# Patient Record
Sex: FEMALE | Race: WHITE | Employment: OTHER | URBAN - METROPOLITAN AREA
[De-identification: names, ages, dates, MRNs, and addresses within clinical notes are randomized per-mention and may not be internally consistent; named-entity substitution may affect disease eponyms.]

---

## 2017-10-01 ENCOUNTER — HOSPITAL ENCOUNTER (EMERGENCY)
Facility: HOSPITAL | Age: 67
Discharge: HOME | End: 2017-10-01
Attending: EMERGENCY MEDICINE
Payer: MEDICARE

## 2017-10-01 ENCOUNTER — APPOINTMENT (OUTPATIENT)
Dept: CT IMAGING | Facility: HOSPITAL | Age: 67
End: 2017-10-01
Attending: EMERGENCY MEDICINE
Payer: MEDICARE

## 2017-10-01 ENCOUNTER — APPOINTMENT (OUTPATIENT)
Dept: GENERAL RADIOLOGY | Facility: HOSPITAL | Age: 67
End: 2017-10-01
Attending: EMERGENCY MEDICINE
Payer: MEDICARE

## 2017-10-01 VITALS
RESPIRATION RATE: 17 BRPM | BODY MASS INDEX: 29.88 KG/M2 | TEMPERATURE: 97 F | WEIGHT: 175 LBS | OXYGEN SATURATION: 96 % | HEIGHT: 64 IN | DIASTOLIC BLOOD PRESSURE: 70 MMHG | HEART RATE: 69 BPM | SYSTOLIC BLOOD PRESSURE: 137 MMHG

## 2017-10-01 DIAGNOSIS — S42.225A CLOSED 2-PART NONDISPLACED FRACTURE OF SURGICAL NECK OF LEFT HUMERUS, INITIAL ENCOUNTER: ICD-10-CM

## 2017-10-01 DIAGNOSIS — S81.811A LEG LACERATION, RIGHT, INITIAL ENCOUNTER: ICD-10-CM

## 2017-10-01 DIAGNOSIS — S09.90XA CLOSED HEAD INJURY, INITIAL ENCOUNTER: Primary | ICD-10-CM

## 2017-10-01 PROCEDURE — 96361 HYDRATE IV INFUSION ADD-ON: CPT

## 2017-10-01 PROCEDURE — 12004 RPR S/N/AX/GEN/TRK7.6-12.5CM: CPT

## 2017-10-01 PROCEDURE — 99284 EMERGENCY DEPT VISIT MOD MDM: CPT

## 2017-10-01 PROCEDURE — 96375 TX/PRO/DX INJ NEW DRUG ADDON: CPT

## 2017-10-01 PROCEDURE — 70450 CT HEAD/BRAIN W/O DYE: CPT | Performed by: EMERGENCY MEDICINE

## 2017-10-01 PROCEDURE — 90471 IMMUNIZATION ADMIN: CPT

## 2017-10-01 PROCEDURE — 73030 X-RAY EXAM OF SHOULDER: CPT | Performed by: EMERGENCY MEDICINE

## 2017-10-01 PROCEDURE — 96374 THER/PROPH/DIAG INJ IV PUSH: CPT

## 2017-10-01 RX ORDER — HYDROMORPHONE HYDROCHLORIDE 1 MG/ML
0.5 INJECTION, SOLUTION INTRAMUSCULAR; INTRAVENOUS; SUBCUTANEOUS ONCE
Status: COMPLETED | OUTPATIENT
Start: 2017-10-01 | End: 2017-10-01

## 2017-10-01 RX ORDER — ONDANSETRON 4 MG/1
4 TABLET, ORALLY DISINTEGRATING ORAL EVERY 4 HOURS PRN
Qty: 10 TABLET | Refills: 0 | Status: SHIPPED | OUTPATIENT
Start: 2017-10-01 | End: 2017-10-08

## 2017-10-01 RX ORDER — HYDROCODONE BITARTRATE AND ACETAMINOPHEN 5; 325 MG/1; MG/1
1-2 TABLET ORAL EVERY 4 HOURS PRN
Qty: 20 TABLET | Refills: 0 | Status: SHIPPED | OUTPATIENT
Start: 2017-10-01 | End: 2017-10-03

## 2017-10-01 RX ORDER — ONDANSETRON 2 MG/ML
4 INJECTION INTRAMUSCULAR; INTRAVENOUS ONCE
Status: COMPLETED | OUTPATIENT
Start: 2017-10-01 | End: 2017-10-01
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
## 2017-10-04 PROBLEM — S42.202A CLOSED FRACTURE OF PROXIMAL END OF LEFT HUMERUS: Status: ACTIVE | Noted: 2017-10-04

## 2017-10-04 PROBLEM — Z96.653 HISTORY OF TOTAL BILATERAL KNEE REPLACEMENT: Status: ACTIVE | Noted: 2017-10-04

## 2017-11-21 PROBLEM — M25.512 ACUTE PAIN OF LEFT SHOULDER: Status: ACTIVE | Noted: 2017-11-21

## 2019-05-05 ENCOUNTER — WALK IN (OUTPATIENT)
Dept: URGENT CARE | Age: 69
End: 2019-05-05

## 2019-05-05 DIAGNOSIS — J06.9 UPPER RESPIRATORY TRACT INFECTION, UNSPECIFIED TYPE: Primary | ICD-10-CM

## 2019-05-05 PROCEDURE — 99203 OFFICE O/P NEW LOW 30 MIN: CPT | Performed by: NURSE PRACTITIONER

## 2019-05-05 RX ORDER — BENZONATATE 100 MG/1
100 CAPSULE ORAL 3 TIMES DAILY PRN
Qty: 15 CAPSULE | Refills: 0 | Status: SHIPPED | OUTPATIENT
Start: 2019-05-05 | End: 2019-05-10

## 2019-05-05 RX ORDER — LEVOTHYROXINE SODIUM 0.05 MG/1
50 TABLET ORAL DAILY
COMMUNITY

## 2019-05-05 RX ORDER — ALBUTEROL SULFATE 90 UG/1
2 AEROSOL, METERED RESPIRATORY (INHALATION) EVERY 4 HOURS PRN
Qty: 1 INHALER | Refills: 0 | Status: SHIPPED | OUTPATIENT
Start: 2019-05-05

## 2019-05-05 ASSESSMENT — PAIN SCALES - GENERAL: PAINLEVEL: 0

## 2019-05-05 ASSESSMENT — ENCOUNTER SYMPTOMS
SINUS PRESSURE: 0
RHINORRHEA: 0
COUGH: 1
WHEEZING: 1
SORE THROAT: 1
SINUS PAIN: 0

## 2019-05-20 PROBLEM — M25.512 ACUTE PAIN OF LEFT SHOULDER: Status: RESOLVED | Noted: 2017-11-21 | Resolved: 2019-05-20

## 2019-05-20 PROBLEM — Z96.653 HISTORY OF TOTAL BILATERAL KNEE REPLACEMENT: Status: RESOLVED | Noted: 2017-10-04 | Resolved: 2019-05-20

## 2019-05-20 PROBLEM — S42.202A CLOSED FRACTURE OF PROXIMAL END OF LEFT HUMERUS: Status: RESOLVED | Noted: 2017-10-04 | Resolved: 2019-05-20

## 2019-11-29 ENCOUNTER — WALK IN (OUTPATIENT)
Dept: URGENT CARE | Age: 69
End: 2019-11-29

## 2019-11-29 DIAGNOSIS — Z23 NEED FOR VACCINATION: Primary | ICD-10-CM

## 2019-11-29 PROCEDURE — G0008 ADMIN INFLUENZA VIRUS VAC: HCPCS | Performed by: NURSE PRACTITIONER

## 2019-11-29 PROCEDURE — 90662 IIV NO PRSV INCREASED AG IM: CPT | Performed by: NURSE PRACTITIONER

## 2020-12-16 PROBLEM — Z96.653 PRESENCE OF BOTH ARTIFICIAL KNEE JOINTS: Status: ACTIVE | Noted: 2019-04-04

## 2020-12-16 PROBLEM — E06.3 HASHIMOTO'S THYROIDITIS: Status: ACTIVE | Noted: 2020-12-16

## 2021-05-26 VITALS
RESPIRATION RATE: 16 BRPM | TEMPERATURE: 98 F | WEIGHT: 174 LBS | DIASTOLIC BLOOD PRESSURE: 76 MMHG | SYSTOLIC BLOOD PRESSURE: 120 MMHG | OXYGEN SATURATION: 97 % | HEART RATE: 110 BPM | HEIGHT: 63 IN | BODY MASS INDEX: 30.83 KG/M2

## 2021-09-22 PROBLEM — Z96.653 PRESENCE OF BOTH ARTIFICIAL KNEE JOINTS: Status: RESOLVED | Noted: 2019-04-04 | Resolved: 2021-09-22

## 2024-08-29 ENCOUNTER — V-VISIT (OUTPATIENT)
Dept: URGENT CARE | Age: 74
End: 2024-08-29

## 2024-08-29 VITALS
SYSTOLIC BLOOD PRESSURE: 144 MMHG | TEMPERATURE: 97.6 F | OXYGEN SATURATION: 96 % | HEART RATE: 89 BPM | BODY MASS INDEX: 26.12 KG/M2 | HEIGHT: 64 IN | RESPIRATION RATE: 16 BRPM | WEIGHT: 153 LBS | DIASTOLIC BLOOD PRESSURE: 86 MMHG

## 2024-08-29 DIAGNOSIS — J01.90 ACUTE NON-RECURRENT SINUSITIS, UNSPECIFIED LOCATION: Primary | ICD-10-CM

## 2024-08-29 ASSESSMENT — ENCOUNTER SYMPTOMS
SORE THROAT: 1
VOMITING: 0
ABDOMINAL PAIN: 0
DIARRHEA: 0
SHORTNESS OF BREATH: 0
SINUS PRESSURE: 1
FEVER: 0
NAUSEA: 0
CHILLS: 1
COUGH: 0
WHEEZING: 0
RHINORRHEA: 1

## 2024-08-29 ASSESSMENT — PAIN SCALES - GENERAL: PAINLEVEL: 0

## 2025-02-20 ENCOUNTER — APPOINTMENT (OUTPATIENT)
Dept: MRI IMAGING | Facility: HOSPITAL | Age: 75
End: 2025-02-20
Attending: EMERGENCY MEDICINE
Payer: MEDICARE

## 2025-02-20 ENCOUNTER — APPOINTMENT (OUTPATIENT)
Dept: CT IMAGING | Facility: HOSPITAL | Age: 75
End: 2025-02-20
Attending: EMERGENCY MEDICINE
Payer: MEDICARE

## 2025-02-20 ENCOUNTER — HOSPITAL ENCOUNTER (INPATIENT)
Facility: HOSPITAL | Age: 75
LOS: 17 days | Discharge: NURSING, REHAB OR LONG-TERM CARE | End: 2025-03-09
Attending: EMERGENCY MEDICINE | Admitting: HOSPITALIST
Payer: MEDICARE

## 2025-02-20 DIAGNOSIS — C34.90 PRIMARY MALIGNANT NEOPLASM OF LUNG METASTATIC TO OTHER SITE, UNSPECIFIED LATERALITY (HCC): ICD-10-CM

## 2025-02-20 DIAGNOSIS — I62.9 INTRACRANIAL HEMORRHAGE (HCC): ICD-10-CM

## 2025-02-20 DIAGNOSIS — R41.0 DELIRIUM, ACUTE: Primary | ICD-10-CM

## 2025-02-20 DIAGNOSIS — E86.0 DEHYDRATION: ICD-10-CM

## 2025-02-20 PROBLEM — I63.89 ACUTE HEMORRHAGIC INFARCTION OF BRAIN (HCC): Status: ACTIVE | Noted: 2025-02-20

## 2025-02-20 LAB
ANION GAP SERPL CALC-SCNC: 15 MMOL/L (ref 0–18)
ANTIBODY SCREEN: NEGATIVE
ATRIAL RATE: 126 BPM
BASOPHILS # BLD AUTO: 0.03 X10(3) UL (ref 0–0.2)
BASOPHILS NFR BLD AUTO: 0.4 %
BILIRUB UR QL: NEGATIVE
BUN BLD-MCNC: 22 MG/DL (ref 9–23)
BUN/CREAT SERPL: 31 (ref 10–20)
CALCIUM BLD-MCNC: 8.3 MG/DL (ref 8.7–10.4)
CHLORIDE SERPL-SCNC: 109 MMOL/L (ref 98–112)
CLARITY UR: CLEAR
CO2 SERPL-SCNC: 19 MMOL/L (ref 21–32)
COLOR UR: YELLOW
CREAT BLD-MCNC: 0.71 MG/DL
DEPRECATED RDW RBC AUTO: 48.2 FL (ref 35.1–46.3)
EGFRCR SERPLBLD CKD-EPI 2021: 89 ML/MIN/1.73M2 (ref 60–?)
EOSINOPHIL # BLD AUTO: 0.01 X10(3) UL (ref 0–0.7)
EOSINOPHIL NFR BLD AUTO: 0.1 %
ERYTHROCYTE [DISTWIDTH] IN BLOOD BY AUTOMATED COUNT: 13 % (ref 11–15)
EST. AVERAGE GLUCOSE BLD GHB EST-MCNC: 128 MG/DL (ref 68–126)
FLUAV + FLUBV RNA SPEC NAA+PROBE: NEGATIVE
FLUAV + FLUBV RNA SPEC NAA+PROBE: NEGATIVE
GLUCOSE BLD-MCNC: 176 MG/DL (ref 70–99)
GLUCOSE BLDC GLUCOMTR-MCNC: 180 MG/DL (ref 70–99)
GLUCOSE UR-MCNC: NORMAL MG/DL
HBA1C MFR BLD: 6.1 % (ref ?–5.7)
HCT VFR BLD AUTO: 35.7 %
HGB BLD-MCNC: 12.2 G/DL
IMM GRANULOCYTES # BLD AUTO: 0.11 X10(3) UL (ref 0–1)
IMM GRANULOCYTES NFR BLD: 1.4 %
KETONES UR-MCNC: NEGATIVE MG/DL
LEUKOCYTE ESTERASE UR QL STRIP.AUTO: NEGATIVE
LYMPHOCYTES # BLD AUTO: 0.56 X10(3) UL (ref 1–4)
LYMPHOCYTES NFR BLD AUTO: 7.3 %
MCH RBC QN AUTO: 34.4 PG (ref 26–34)
MCHC RBC AUTO-ENTMCNC: 34.2 G/DL (ref 31–37)
MCV RBC AUTO: 100.6 FL
MONOCYTES # BLD AUTO: 0.41 X10(3) UL (ref 0.1–1)
MONOCYTES NFR BLD AUTO: 5.3 %
NEUTROPHILS # BLD AUTO: 6.59 X10 (3) UL (ref 1.5–7.7)
NEUTROPHILS # BLD AUTO: 6.59 X10(3) UL (ref 1.5–7.7)
NEUTROPHILS NFR BLD AUTO: 85.5 %
NITRITE UR QL STRIP.AUTO: NEGATIVE
OSMOLALITY SERPL CALC.SUM OF ELEC: 304 MOSM/KG (ref 275–295)
P AXIS: 26 DEGREES
P-R INTERVAL: 118 MS
PH UR: 5.5 [PH] (ref 5–8)
PLATELET # BLD AUTO: 139 10(3)UL (ref 150–450)
POTASSIUM SERPL-SCNC: 3.7 MMOL/L (ref 3.5–5.1)
PROT UR-MCNC: 20 MG/DL
Q-T INTERVAL: 306 MS
QRS DURATION: 82 MS
QTC CALCULATION (BEZET): 443 MS
R AXIS: 34 DEGREES
RBC # BLD AUTO: 3.55 X10(6)UL
RH BLOOD TYPE: POSITIVE
RH BLOOD TYPE: POSITIVE
RSV RNA SPEC NAA+PROBE: NEGATIVE
SARS-COV-2 RNA RESP QL NAA+PROBE: NOT DETECTED
SODIUM SERPL-SCNC: 143 MMOL/L (ref 136–145)
SP GR UR STRIP: >1.03 (ref 1–1.03)
T AXIS: -6 DEGREES
UROBILINOGEN UR STRIP-ACNC: NORMAL
VENTRICULAR RATE: 126 BPM
WBC # BLD AUTO: 7.7 X10(3) UL (ref 4–11)

## 2025-02-20 PROCEDURE — 72125 CT NECK SPINE W/O DYE: CPT | Performed by: EMERGENCY MEDICINE

## 2025-02-20 PROCEDURE — 70450 CT HEAD/BRAIN W/O DYE: CPT | Performed by: EMERGENCY MEDICINE

## 2025-02-20 RX ORDER — METOCLOPRAMIDE HYDROCHLORIDE 5 MG/ML
10 INJECTION INTRAMUSCULAR; INTRAVENOUS EVERY 8 HOURS PRN
Status: DISCONTINUED | OUTPATIENT
Start: 2025-02-20 | End: 2025-02-21

## 2025-02-20 RX ORDER — BENZONATATE 100 MG/1
1 CAPSULE ORAL 3 TIMES DAILY PRN
COMMUNITY
Start: 2025-02-03

## 2025-02-20 RX ORDER — LEVETIRACETAM 500 MG/5ML
1000 INJECTION, SOLUTION, CONCENTRATE INTRAVENOUS ONCE
Status: COMPLETED | OUTPATIENT
Start: 2025-02-20 | End: 2025-02-20

## 2025-02-20 RX ORDER — ONDANSETRON 2 MG/ML
4 INJECTION INTRAMUSCULAR; INTRAVENOUS EVERY 6 HOURS PRN
Status: DISCONTINUED | OUTPATIENT
Start: 2025-02-20 | End: 2025-02-21

## 2025-02-20 RX ORDER — MIRTAZAPINE 7.5 MG/1
1 TABLET, FILM COATED ORAL NIGHTLY
COMMUNITY
Start: 2025-02-01 | End: 2025-03-09

## 2025-02-20 RX ORDER — LEVETIRACETAM 500 MG/5ML
500 INJECTION, SOLUTION, CONCENTRATE INTRAVENOUS EVERY 12 HOURS
Status: DISCONTINUED | OUTPATIENT
Start: 2025-02-21 | End: 2025-03-07

## 2025-02-20 RX ORDER — ACETAMINOPHEN 325 MG/1
650 TABLET ORAL EVERY 4 HOURS PRN
COMMUNITY
Start: 2025-01-06 | End: 2026-01-28

## 2025-02-20 RX ORDER — PREDNISONE 20 MG/1
0.5 TABLET ORAL DAILY
COMMUNITY
Start: 2025-02-01 | End: 2025-03-09

## 2025-02-20 RX ORDER — ACETAMINOPHEN 325 MG/1
650 TABLET ORAL EVERY 4 HOURS PRN
Status: DISCONTINUED | OUTPATIENT
Start: 2025-02-20 | End: 2025-02-21

## 2025-02-20 RX ORDER — SODIUM CHLORIDE 9 MG/ML
INJECTION, SOLUTION INTRAVENOUS CONTINUOUS
Status: DISCONTINUED | OUTPATIENT
Start: 2025-02-20 | End: 2025-02-21

## 2025-02-20 RX ORDER — BIOTIN 1 MG
1000 TABLET ORAL DAILY
COMMUNITY
Start: 2024-12-26

## 2025-02-20 RX ORDER — FOLIC ACID 1 MG/1
1 TABLET ORAL DAILY
COMMUNITY
Start: 2024-12-09 | End: 2025-06-07

## 2025-02-20 RX ORDER — LABETALOL HYDROCHLORIDE 5 MG/ML
10 INJECTION, SOLUTION INTRAVENOUS EVERY 10 MIN PRN
Status: DISCONTINUED | OUTPATIENT
Start: 2025-02-20 | End: 2025-02-21

## 2025-02-20 RX ORDER — POTASSIUM CHLORIDE 1500 MG/1
20 TABLET, EXTENDED RELEASE ORAL DAILY
COMMUNITY
Start: 2025-01-27 | End: 2025-03-09

## 2025-02-20 RX ORDER — ACETAMINOPHEN 650 MG/1
650 SUPPOSITORY RECTAL EVERY 4 HOURS PRN
Status: DISCONTINUED | OUTPATIENT
Start: 2025-02-20 | End: 2025-02-21

## 2025-02-20 RX ORDER — HYDRALAZINE HYDROCHLORIDE 20 MG/ML
10 INJECTION INTRAMUSCULAR; INTRAVENOUS EVERY 2 HOUR PRN
Status: DISCONTINUED | OUTPATIENT
Start: 2025-02-20 | End: 2025-02-21

## 2025-02-20 RX ORDER — ALBUTEROL SULFATE 90 UG/1
1 INHALANT RESPIRATORY (INHALATION)
COMMUNITY

## 2025-02-20 NOTE — ED QUICK NOTES
Orders for admission, patient is aware of plan and ready to go upstairs. Any questions, please call ED RN Bernice at extension [phone].     Patient Covid vaccination status: Fully vaccinated     COVID Test Ordered in ED: SARS-CoV-2/Flu A and B/RSV by PCR (GeneXpert)    COVID Suspicion at Admission: N/A    Running Infusions:  None    Mental Status/LOC at time of transport: x2    Other pertinent information:   CIWA score: N/A   NIH score:  N/A

## 2025-02-20 NOTE — ED QUICK NOTES
Per pt's son patient was getting blood drawn and had LOC, denies any seizure like activity. Pt was at clinic only for blood draw not for transfusion.   Pt's son reports pt has had elevated HR since December.
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
## 2025-02-20 NOTE — H&P
[facility] Hospitalist H&P       CC:   Chief Complaint   Patient presents with    Syncope        PCP: Shruthi Simon MD    Date of Admission: 2/20/2025 11:21 AM    ASSESSMENT / PLAN:     Ms. 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     ?Hemorrhagic metastasis   L sided weakness  Syncope  - noted on CT head  - hold Eliquis  - MRI brain ordered in ER  - NSGY and neurology consulted  - d/w Dr. Tracy, will start keppra load  - neurochecks    Lung Ca with recurrent malignant pleural effusion  PE hx   Small R PTX - POA   Chronic hypoxic respiratory failure on home O2  - S/p R sided PleurX placement 1/31/25  - drain scheduled MWF 9:30AM, up to 1L  - due to drain tomorrow  - Already has 2L O2 at home - at baseline now     Lung cancer with brain mets  - follow-up oncology as outpatient  - planned to start chemotherapy this week    - per family, had recent MRI brain a few weeks ago that showed decrease in size of brain mets  - has been on prednisone taper since November, currently on 10 mg daily    DVT/PE  - hold Eliquis     Hypothyroidism  - Continue synthroid    ACP  - CODE- DNR/full- confirmed with POA  - POA- son- updated at bedside   - lives at home with son    FN:  - IVF: none  - Diet: regular    DVT Prophy: SCD  Lines: PVI    Dispo: pending clinical course    Outpatient records or previous hospital records reviewed.     Further recommendations pending patient's clinical course.  [facility] hospitalist to continue to follow patient while in house    Patient and/or patient's family given opportunity to ask questions and note understanding and agreeing with therapeutic plan as outlined    Vanessa Elam MD  [facility] Hospitalist  Answering Service number: [phone]    HPI     History of Present Illness:     Ms. 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     History obtained mostly from son. Patient lives with son, was recently admitted at [facility] about 1 month ago with the flu and hasn't fully recovered since. Mostly bed/wheelchair bound since admission, able to transfer from bed to chair with stand and pivot. Normally has some issues with word finding and slow sleech. Son helped patient go to the bathroom at 2AM and got her back into bed then checked on her in the morning at 7AM and found her on the floor with some dried blood on her lips. Thinks she slid out of bed sometime in the night. He called EMS who was able to get her back to bed and do an assessment. Had normal vitals, no obvious fractures or significant bruising, offered to take her to the ER but she also had an oncologist appt that morning so they planned to go to that instead. While she was getting blood drawn from her port, she became unresponsive. Son felt like she was having a seizure however no seizure like activity noted but she was in and out of consciousness. EMS called and brought her here. Son notes that her speech is a little slower than usual and she is having more word finding difficulty than normal. Normally has some double vision. Has had generlized weakness since last admission but no unilateral weakness that family was aware of.     In the ED, BP Stable, tachycardic, O2 sat stable on home 2L NC. Labs without significant abnormality. CT head concern for hemorrhagic metastasis. NSGY consulted.       PMH  Past Medical History:    Depression    off Cymbalta    Esophageal reflux    HYPERLIPIDEMIA    OSTEOARTHRITIS    OTHER DISEASES    Hashimoto's--sees Dr. Palmer--Endo    OTHER DISEASES    DEXA-WNL 2008    Reflux    Thyroid disease        PSH  Past Surgical History:    Procedure Laterality Date    Appendectomy      Colonoscopy      -Dr. Rodriges--colonoscopy-normal--next 2018    Hysterectomy      partial hysterectomy-still with bilateral ovaries--due to proloapsed uterus--PAP's always normal    Knee replacement surgery      L-knee TKP-Dr. Carney '    Laparoscopic  2014    Procedure: LAPAROSCOPIC CHOLECYSTECTOMY WITH  CHOLANGIOGRAM   POSS OPEN;  Surgeon: Monik Simon MD;  [facility]    Other surgical history      Arthroscopy    Other surgical history      both knee replacement        ALL:  Allergies[1]     Home Medications:  Medications Taking[2]      Soc Hx  Social History     Tobacco Use    Smoking status: Former     Current packs/day: 0.00     Average packs/day: 2.5 packs/day for 26.0 years (65.0 ttl pk-yrs)     Types: Cigarettes     Start date: 1963     Quit date: 1989     Years since quittin.9    Smokeless tobacco: Former   Substance Use Topics    Alcohol use: Not Currently     Alcohol/week: 0.0 standard drinks of alcohol        Fam Hx  Family History   Problem Relation Age of Onset    Cancer Father         EtOH, Liver Dz    Cancer Mother         colon cancer and Alzheimer;s-diagnosed 68       Review of Systems  Comprehensive ROS reviewed and negative except for what's stated above.     OBJECTIVE:  /79   Pulse 117   Temp 99.3 °F (37.4 °C) (Oral)   Resp 25   Wt 162 lb 0.6 oz (73.5 kg)   SpO2 96%   BMI 28.70 kg/m²     GEN: female in NAD, slow slightly slurred speech with some word finding difficulty, appears tired  HEENT: EOMI, PERRLA, L eyelid shut- baseline per family  Pulm: CTAB, no crackles or wheezes  CV: RRR, no murmurs  ABD: Soft, non-tender, non-distended, +BS  MSK: LUE 2/5, LLE 3/5, RUE 5/5, RLE 4/5  Neuro: weakness as above, CN intact, sensory intact, L side neglect  Psych: Affect- normal  SKIN: warm, dry  EXT: no edema    Diagnostic Data:    CBC/Chem    Recent Labs   Lab 25  1136   RBC 3.55*   HGB  12.2   HCT 35.7   .6*   MCH 34.4*   MCHC 34.2   RDW 13.0   NEPRELIM 6.59   WBC 7.7   .0*         Recent Labs   Lab 02/20/25  1136   *   BUN 22   CREATSERUM 0.71   EGFRCR 89   CA 8.3*      K 3.7      CO2 19.0*     Lab Results   Component Value Date    WBC 7.7 02/20/2025    HGB 12.2 02/20/2025    HCT 35.7 02/20/2025    .0 02/20/2025    CREATSERUM 0.71 02/20/2025    BUN 22 02/20/2025     02/20/2025    K 3.7 02/20/2025     02/20/2025    CO2 19.0 02/20/2025     02/20/2025    CA 8.3 02/20/2025       No results for input(s): \"TROP\" in the last 168 hours.    Additional Diagnostics:      Radiology: CT SPINE CERVICAL (CPT=72125)    Result Date: 2/20/2025  CONCLUSION:  1. No acute fracture or posttraumatic malalignment cervical spine.  2. Numerous osteoblastic presumed osseous metastases.  3. Multilevel degenerative changes of the cervical spine are apparent.  4. Lesser incidental findings as above.    Dictated by (CST): Rayo Curiel MD on 2/20/2025 at 12:59 PM     Finalized by (CST): Rayo Curiel MD on 2/20/2025 at 1:02 PM          CT BRAIN OR HEAD (CPT=70450)    Result Date: 2/20/2025  CONCLUSION: Very small subtle area of decreased attenuation within the left frontoparietal lobe with small focal area of of peripheral increased density.  The findings could represent a hemorrhagic metastasis.  However this is an equivocal finding.  There is no mass effect or associated abnormality.  There are no similar appearing areas within the brain. The remainder of the exam is unremarkable.    Dictated by (CST): Aki Morrison MD on 2/20/2025 at 12:45 PM     Finalized by (CST): Aki Morrison MD on 2/20/2025 at 12:52 PM             MRI brain 1/25/24  T1 axial and sagittal, T2, FLAIR and diffusion axial images were obtained as a stat procedure, followed by T1 axial, sagittal and coronal images after the intravenous infusion of Dotarem. Comparison is made to the prior  outside scan dated 10/17/2024.     There are several T1 hyperintense lesions with corresponding gradient echo artifact, and additional enhancing lesions, all much smaller than before. The largest is 6 mm in diameter. The lesions demonstrating persistent enhancement are located in several foci in the cerebellar hemispheres bilaterally, posterior right sola, anterior right frontal lobe and high subcortical left frontal lobe. No associated mass effect.     Multiple small foci of FLAIR hyperintensity in the deep and subcortical white matter similar to previous. Lacunar infarct in the left lentiform nucleus and external capsule. No findings to suggest acute ischemia.     Brain volume and ventricular caliber are normal for age.     IMPRESSION:     Reduction in size of metastatic lesions since 10/17/2024. Many of them no longer enhance. Most are again seen to be associated with petechial microhemorrhage. No new lesions are apparent.          [1] No Known Allergies  [2]   Outpatient Medications Marked as Taking for the 2/20/25 encounter (Hospital Encounter)   Medication Sig Dispense Refill    apixaban 5 MG Oral Tab Take 1 tablet (5 mg total) by mouth 2 (two) times daily.

## 2025-02-21 ENCOUNTER — APPOINTMENT (OUTPATIENT)
Dept: MRI IMAGING | Facility: HOSPITAL | Age: 75
End: 2025-02-21
Attending: NURSE PRACTITIONER
Payer: MEDICARE

## 2025-02-21 LAB
ANION GAP SERPL CALC-SCNC: 10 MMOL/L (ref 0–18)
BUN BLD-MCNC: 16 MG/DL (ref 9–23)
BUN/CREAT SERPL: 25.8 (ref 10–20)
CALCIUM BLD-MCNC: 7.7 MG/DL (ref 8.7–10.4)
CHLORIDE SERPL-SCNC: 111 MMOL/L (ref 98–112)
CO2 SERPL-SCNC: 23 MMOL/L (ref 21–32)
CREAT BLD-MCNC: 0.62 MG/DL
DEPRECATED RDW RBC AUTO: 49 FL (ref 35.1–46.3)
EGFRCR SERPLBLD CKD-EPI 2021: 93 ML/MIN/1.73M2 (ref 60–?)
ERYTHROCYTE [DISTWIDTH] IN BLOOD BY AUTOMATED COUNT: 13.2 % (ref 11–15)
GLUCOSE BLD-MCNC: 92 MG/DL (ref 70–99)
GLUCOSE BLDC GLUCOMTR-MCNC: 106 MG/DL (ref 70–99)
GLUCOSE BLDC GLUCOMTR-MCNC: 108 MG/DL (ref 70–99)
GLUCOSE BLDC GLUCOMTR-MCNC: 83 MG/DL (ref 70–99)
GLUCOSE BLDC GLUCOMTR-MCNC: 87 MG/DL (ref 70–99)
GLUCOSE BLDC GLUCOMTR-MCNC: 95 MG/DL (ref 70–99)
HCT VFR BLD AUTO: 30.2 %
HGB BLD-MCNC: 10.4 G/DL
MCH RBC QN AUTO: 35.1 PG (ref 26–34)
MCHC RBC AUTO-ENTMCNC: 34.4 G/DL (ref 31–37)
MCV RBC AUTO: 102 FL
OSMOLALITY SERPL CALC.SUM OF ELEC: 299 MOSM/KG (ref 275–295)
PLATELET # BLD AUTO: 115 10(3)UL (ref 150–450)
POTASSIUM SERPL-SCNC: 3.8 MMOL/L (ref 3.5–5.1)
RBC # BLD AUTO: 2.96 X10(6)UL
SODIUM SERPL-SCNC: 144 MMOL/L (ref 136–145)
WBC # BLD AUTO: 5.4 X10(3) UL (ref 4–11)

## 2025-02-21 PROCEDURE — 99291 CRITICAL CARE FIRST HOUR: CPT | Performed by: NEUROLOGICAL SURGERY

## 2025-02-21 PROCEDURE — 95816 EEG AWAKE AND DROWSY: CPT | Performed by: OTHER

## 2025-02-21 PROCEDURE — 70553 MRI BRAIN STEM W/O & W/DYE: CPT | Performed by: NURSE PRACTITIONER

## 2025-02-21 PROCEDURE — 99223 1ST HOSP IP/OBS HIGH 75: CPT | Performed by: OTHER

## 2025-02-21 RX ORDER — LEVOTHYROXINE SODIUM 50 UG/1
50 TABLET ORAL DAILY
Status: DISCONTINUED | OUTPATIENT
Start: 2025-02-21 | End: 2025-03-09

## 2025-02-21 RX ORDER — PANTOPRAZOLE SODIUM 40 MG/1
40 TABLET, DELAYED RELEASE ORAL
Status: DISCONTINUED | OUTPATIENT
Start: 2025-02-21 | End: 2025-02-21

## 2025-02-21 RX ORDER — ACETAMINOPHEN 650 MG/1
650 SUPPOSITORY RECTAL EVERY 4 HOURS PRN
Status: DISCONTINUED | OUTPATIENT
Start: 2025-02-21 | End: 2025-03-09

## 2025-02-21 RX ORDER — PREDNISONE 10 MG/1
10 TABLET ORAL DAILY
Status: DISCONTINUED | OUTPATIENT
Start: 2025-02-21 | End: 2025-02-25

## 2025-02-21 RX ORDER — FAMOTIDINE 10 MG/ML
20 INJECTION, SOLUTION INTRAVENOUS DAILY
Status: DISCONTINUED | OUTPATIENT
Start: 2025-02-21 | End: 2025-03-09

## 2025-02-21 RX ORDER — ONDANSETRON 2 MG/ML
4 INJECTION INTRAMUSCULAR; INTRAVENOUS EVERY 6 HOURS PRN
Status: DISCONTINUED | OUTPATIENT
Start: 2025-02-21 | End: 2025-03-09

## 2025-02-21 RX ORDER — ALBUTEROL SULFATE 90 UG/1
1 INHALANT RESPIRATORY (INHALATION) EVERY 6 HOURS PRN
Status: DISCONTINUED | OUTPATIENT
Start: 2025-02-21 | End: 2025-03-09

## 2025-02-21 RX ORDER — LORAZEPAM 2 MG/ML
1 INJECTION INTRAMUSCULAR ONCE
Status: DISCONTINUED | OUTPATIENT
Start: 2025-02-21 | End: 2025-02-21

## 2025-02-21 RX ORDER — FAMOTIDINE 20 MG/1
20 TABLET, FILM COATED ORAL DAILY
Status: DISCONTINUED | OUTPATIENT
Start: 2025-02-21 | End: 2025-03-09

## 2025-02-21 RX ORDER — POTASSIUM CHLORIDE 1500 MG/1
40 TABLET, EXTENDED RELEASE ORAL ONCE
Status: COMPLETED | OUTPATIENT
Start: 2025-02-21 | End: 2025-02-21

## 2025-02-21 RX ORDER — SODIUM CHLORIDE 9 MG/ML
INJECTION, SOLUTION INTRAVENOUS CONTINUOUS
Status: DISCONTINUED | OUTPATIENT
Start: 2025-02-21 | End: 2025-02-23

## 2025-02-21 RX ORDER — LABETALOL HYDROCHLORIDE 5 MG/ML
10 INJECTION, SOLUTION INTRAVENOUS EVERY 10 MIN PRN
Status: DISCONTINUED | OUTPATIENT
Start: 2025-02-21 | End: 2025-03-09

## 2025-02-21 RX ORDER — MIRTAZAPINE 7.5 MG/1
7.5 TABLET, FILM COATED ORAL NIGHTLY
Status: DISCONTINUED | OUTPATIENT
Start: 2025-02-21 | End: 2025-02-25

## 2025-02-21 RX ORDER — METOCLOPRAMIDE HYDROCHLORIDE 5 MG/ML
10 INJECTION INTRAMUSCULAR; INTRAVENOUS EVERY 8 HOURS PRN
Status: DISCONTINUED | OUTPATIENT
Start: 2025-02-21 | End: 2025-03-09

## 2025-02-21 RX ORDER — POTASSIUM CHLORIDE 14.9 MG/ML
20 INJECTION INTRAVENOUS ONCE
Status: DISCONTINUED | OUTPATIENT
Start: 2025-02-21 | End: 2025-02-21

## 2025-02-21 RX ORDER — GADOTERATE MEGLUMINE 376.9 MG/ML
15 INJECTION INTRAVENOUS
Status: COMPLETED | OUTPATIENT
Start: 2025-02-21 | End: 2025-02-21

## 2025-02-21 RX ORDER — HYDRALAZINE HYDROCHLORIDE 20 MG/ML
10 INJECTION INTRAMUSCULAR; INTRAVENOUS EVERY 2 HOUR PRN
Status: DISCONTINUED | OUTPATIENT
Start: 2025-02-21 | End: 2025-03-09

## 2025-02-21 RX ORDER — POTASSIUM CHLORIDE 1500 MG/1
20 TABLET, EXTENDED RELEASE ORAL ONCE
Status: DISCONTINUED | OUTPATIENT
Start: 2025-02-21 | End: 2025-02-21

## 2025-02-21 RX ORDER — LORAZEPAM 2 MG/ML
1 INJECTION INTRAMUSCULAR ONCE AS NEEDED
Status: ACTIVE | OUTPATIENT
Start: 2025-02-21 | End: 2025-02-21

## 2025-02-21 RX ORDER — ACETAMINOPHEN 325 MG/1
650 TABLET ORAL EVERY 4 HOURS PRN
Status: DISCONTINUED | OUTPATIENT
Start: 2025-02-21 | End: 2025-03-09

## 2025-02-21 NOTE — CONSULTS
[facility]  part of [facility]    Neurosurgery Consultation    Damari Hong Patient Status:  Inpatient    1950 MRN [id number]   Location [facility] 2W/SW Attending Vanessa Elam MD   Hosp Day # 1 PCP Shruthi Simon MD     Date of Admission:  2025  Date of Consult:  2025    Reason for Consultation:  Hemorrhagic brain metastasis    History of Present Illness:  Damari Hong is 74-year-old female with a history of lung cancer, malignant pleural effusion, PE, chronic respiratory failure, and on home oxygen presented with a possible syncopal episode and new-onset left-sided weakness. Patient was found down on the floor by EMS but was not brought to the hospital due to being back at her baseline. She then later in the day experienced an episode where she stopped answering questions and had a possible seizure. Patient reported some left sided weakness and difficulty with answering questions. Patient has previous diagnosis of lung cancer with metastasis to the brain and undergone whole brain radiation and some immunotherapy. She had a PE in December and has been on Eliquis.     History:  Past Medical History:    Depression    off Cymbalta    Esophageal reflux    HYPERLIPIDEMIA    OSTEOARTHRITIS    OTHER DISEASES    Hashimoto's--sees Dr. Palmer--Endo    OTHER DISEASES    DEXA-WNL     Reflux    Thyroid disease     Past Surgical History:   Procedure Laterality Date    Appendectomy      Colonoscopy      -Dr. Rodriges--colonoscopy-normal--next 2018    Hysterectomy      partial hysterectomy-still with bilateral ovaries--due to proloapsed uterus--PAP's always normal    Knee replacement surgery      L-knee TKP-Dr. Carney '    Laparoscopic  2014    Procedure: LAPAROSCOPIC CHOLECYSTECTOMY WITH  CHOLANGIOGRAM   POSS OPEN;  Surgeon: Monik Simon MD;  [place] [facility]    Other surgical history      Arthroscopy    Other surgical history      both  knee replacement     Family History   Problem Relation Age of Onset    Cancer Father         EtOH, Liver Dz    Cancer Mother         colon cancer and Alzheimer;s-diagnosed 68      reports that she quit smoking about 35 years ago. Her smoking use included cigarettes. She started smoking about 61 years ago. She has a 65 pack-year smoking history. She has quit using smokeless tobacco. She reports that she does not currently use alcohol. She reports that she does not use drugs.    Allergies:  Allergies[1]    Medications:    Current Facility-Administered Medications:     LORazepam (Ativan) 2 mg/mL injection 1 mg, 1 mg, Intravenous, Once PRN    sodium chloride 0.9% infusion, , Intravenous, Continuous    acetaminophen (Tylenol) tab 650 mg, 650 mg, Oral, Q4H PRN **OR** acetaminophen (Tylenol) rectal suppository 650 mg, 650 mg, Rectal, Q4H PRN    labetalol (Trandate) 5 mg/mL injection 10 mg, 10 mg, Intravenous, Q10 Min PRN    hydrALAzine (Apresoline) 20 mg/mL injection 10 mg, 10 mg, Intravenous, Q2H PRN    famotidine (Pepcid) tab 20 mg, 20 mg, Oral, Daily **OR** famotidine (Pepcid) 20 mg/2mL injection 20 mg, 20 mg, Intravenous, Daily    ondansetron (Zofran) 4 MG/2ML injection 4 mg, 4 mg, Intravenous, Q6H PRN **OR** metoclopramide (Reglan) 5 mg/mL injection 10 mg, 10 mg, Intravenous, Q8H PRN    albuterol (Ventolin HFA) 108 (90 Base) MCG/ACT inhaler 1 puff, 1 puff, Inhalation, Q6H PRN    mirtazapine (Remeron) tab 7.5 mg, 7.5 mg, Oral, Nightly    pantoprazole (Protonix) DR tab 40 mg, 40 mg, Oral, QAM AC    predniSONE (Deltasone) tab 10 mg, 10 mg, Oral, Daily    levothyroxine (Synthroid) tab 50 mcg, 50 mcg, Oral, Daily    potassium chloride 20 mEq/100mL IVPB premix 20 mEq, 20 mEq, Intravenous, Once    levETIRAcetam (Keppra) 500 mg/5mL injection 500 mg, 500 mg, Intravenous, Q12H    sodium chloride 0.9% infusion, , Intravenous, Continuous    Review of Systems:  A 10-point system was reviewed.  Pertinent positives and negatives are  noted in HPI.      Physical Exam:  Temp:  [97.3 °F (36.3 °C)-99.3 °F (37.4 °C)] 97.3 °F (36.3 °C)  Pulse:  [] 105  Resp:  [17-29] 18  BP: (101-130)/(66-89) 130/89  SpO2:  [92 %-98 %] 94 %  I/O last 3 completed shifts:  In: 1568.2 [I.V.:1568.2]  Out: 200 [Urine:200]      Neurological Exam:  Alert and Oriented to person and place  Follows some commands  EOMI, PERRL  Slight left facial droop with ptosis  Palate elevates symmetrically  No pronator drift    LUE does not move to noxious stimuli  Left hand  5/5  LLE 4/5  RUE and RLE 5/5 throughout    Sensation intact in BUE and BLE    Negative corral sign in BUE    Abdomen:  Soft, non-distended, non-tender, with no rebound or guarding.  No peritoneal signs.     Extremities:  Non-tender, no lower extremity edema noted.      Labs:  Lab Results   Component Value Date    WBC 5.4 02/21/2025    HGB 10.4 (L) 02/21/2025    .0 (L) 02/21/2025    BUN 16 02/21/2025     02/21/2025    K 3.8 02/21/2025    CO2 23.0 02/21/2025    GLU 92 02/21/2025    ALB 4.8 03/23/2022    INR 1.0 09/21/2010    CRP <0.30 09/27/2021    ESRML 14 09/27/2021       Imaging:  CT SPINE CERVICAL (CPT=72125)    Result Date: 2/20/2025  CONCLUSION:  1. No acute fracture or posttraumatic malalignment cervical spine.  2. Numerous osteoblastic presumed osseous metastases.  3. Multilevel degenerative changes of the cervical spine are apparent.  4. Lesser incidental findings as above.    Dictated by (CST): Rayo Curiel MD on 2/20/2025 at 12:59 PM     Finalized by (CST): Rayo Curiel MD on 2/20/2025 at 1:02 PM          CT BRAIN OR HEAD (CPT=70450)    Result Date: 2/20/2025  CONCLUSION: Very small subtle area of decreased attenuation within the left frontoparietal lobe with small focal area of of peripheral increased density.  The findings could represent a hemorrhagic metastasis.  However this is an equivocal finding.  There is no mass effect or associated abnormality.  There are no similar  appearing areas within the brain. The remainder of the exam is unremarkable.    Dictated by (CST): Aki Morrison MD on 2/20/2025 at 12:45 PM     Finalized by (CST): Aki Morrison MD on 2/20/2025 at 12:52 PM              Assessment/Plan:  Principal Problem:    Delirium, acute  Active Problems:    Dehydration    Primary malignant neoplasm of lung metastatic to other site, unspecified laterality (HCC)    Intracranial hemorrhage (HCC)    Acute hemorrhagic infarction of brain (HCC)    Our patient presents with a history of metastatic stage IV lung cancer and sudden neurologic change yesterday with left hemiparesis and a left-sided facial droop.  She has a history of metastatic disease to the brain which was treated with radiation.  She was brought in through the emergency room yesterday and CT imaging suggests very small area of possible hemorrhagic metastases in the left frontoparietal area.  Anatomically, this is not related to his clinical presentation.  We have recommended an MRI of the brain pre and postcontrast.  Differential diagnosis includes postictal Korey's type paralysis and we we will defer to our neurology colleagues for evaluation of possible seizure activity.  We will follow.    35 minutes of high complexity ICU care was provided.    More than 60 minutes were spent in consultation and coordination of this patient's care. All questions and concerns were addressed. We appreciate the opportunity to participate in the care of this patient. Please do not hesitate to call our office ([phone]) with any issues.    Parker Berger MD  2/21/2025  7:44 AM         [1] No Known Allergies

## 2025-02-21 NOTE — CM/SW NOTE
MDO for HH eval    Per PT OT Anticipated therapy need:   Patient will benefit from continued skilled PT Services to promote return to prior level of function and safety with continuous assistance and gradual rehabilitative therapy . Anticipate that family will decline rehab to bring pt home with family assist and HHPT.     CM/SW will continue to follow as needs become more known.    Plan  Pending    / to remain available for support and/or discharge planning.     Parris Vergara RN    Ext [phone]

## 2025-02-21 NOTE — SLP NOTE
ADULT SWALLOWING EVALUATION    ASSESSMENT    ASSESSMENT/OVERALL IMPRESSION:      Proper PPE worn. Hands sanitized upon entrance/exit Pt room.       PER MD NOTE:  Ms. 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        BSE ordered 2/2 RN dysphagia screen. Pt admitted 2/20/25 with delirium, acute. Pt on solid/thin liquids at home with six children rotating weeks to stay with Pt. No PMH of dysphagia at [facility]. Last [facility] swallow note 12/26/24 indicated clear thin liquid diet. Currently, Pt NPO.      CT Brain 2/20/25:  CONCLUSION: Very small subtle area of decreased attenuation within the left frontoparietal lobe with small focal area of of peripheral increased density.  The findings could represent a hemorrhagic metastasis.  However this is an equivocal finding.    There is no mass effect or associated abnormality.  There are no similar appearing areas within the brain. The remainder of the exam is unremarkable.       MRI Brain ordered.     No CXR completed.       Pt alert, on 2L/min 02 NC, afebrile and assessed sitting upright in bed (after consulting with RN). Son present. Pt cooperative. Learning preference verbal.No verbal or non-verbal indication of pain. Vocal quality/intensity weak. Volitional swallow and cough present; considered reduced in strength. Oral motor exam revealed overall reduced labial and lingual skills for rate, ROM and strength. Functional dentition. Pt was-fed pureed, mildly-thick via cup/tsp[and thin liquid trials via tsp. Bilabial seal adequate; no anterior loss. Lingual skills slow for bolus formation and preparation of pureed trials with increased RAGHAV 2/2 lingual weakness. Oral cavity essentially clear post swallows.     Pharyngeal response appeared 2-3 sec delayed per hyolaryngeal elevation to  completion (considered weak in strength/rise to palpation). No overt CSA on pureed nor mildly-thick liquids. Weak cough S/P controlled thin liquids via tsp. Overall, swallow function appeared weak with reduced coordination. Sp02 ~94% during this assessment.        IMPRESSIONS:    Pt presents with mild to moderate oral dysphagia and possible pharyngeal dysfunction. Collaborated with RN regarding Pt's swallowing plan of care. BSE results/recommendations discussed with Pt/son. Pt/son v/u. Pt would benefit from additional reinforcement. Swallowing precautions written on white board in room for reinforcement/carry-over of skills for Pt, family and staff. Call light within Pt's reach upon SLP discharge from room.          PLAN:    To f/u x4-5 sessions to ensure safe intake of diet and reinforce swallowing/aspiration precautions. To monitor for new CXR results. Diet upgrade as tolerated. VFSS IF appropriate. Family education to be continued as available.        RECOMMENDATIONS   Diet Recommendations - Solids: Puree  Diet Recommendations - Liquids: Nectar thick liquids/ Mildly thick (via tsp)    Compensatory Strategies Recommended: Liquids via spoon  Aspiration Precautions: Upright position;Slow rate;Small bites;No straw  Medication Administration Recommendations: Whole in puree  Treatment Plan/Recommendations: Aspiration precautions    HISTORY   MEDICAL HISTORY  Reason for Referral: RN dysphagia screen    Problem List  Principal Problem:    Delirium, acute  Active Problems:    Dehydration    Primary malignant neoplasm of lung metastatic to other site, unspecified laterality (HCC)    Intracranial hemorrhage (HCC)    Acute hemorrhagic infarction of brain (HCC)      Past Medical History  Past Medical History:    Depression    off Cymbalta    Esophageal reflux    HYPERLIPIDEMIA    Lung cancer (HCC)    with brain mets    OSTEOARTHRITIS    OTHER DISEASES    Hashimoto's--sees Dr. Palmer--Endo    OTHER DISEASES    DEXA-WNL 2008     Reflux    Thyroid disease       Prior Living Situation: Home with support  Diet Prior to Admission: Regular;Thin liquids  Precautions: Aspiration    Patient/Family Goals: to eat    SWALLOWING HISTORY  Current Diet Consistency: NPO    OBJECTIVE   ORAL MOTOR EXAMINATION  Dentition: Natural;Functional  Symmetry: Within Functional Limits  Strength: Overall reduced  Range of Motion: Overall reduced  Rate of Motion: Reduced    Voice Quality: Weak  Respiratory Status: Supplemental O2;Nasal cannula  Consistencies Trialed: Puree;Nectar thick liquids;Thin liquids  Method of Presentation: Staff/Clinician assistance;Spoon;Cup  Patient Positioned: Upright;Midline    Oral Phase of Swallow: Impaired  Bolus Formation: Impaired  Bolus Propulsion: Impaired  Pharyngeal Phase of Swallow: Appears Impaired  Laryngeal Elevation Timing: Appears impaired  Laryngeal Elevation Strength: Appears impaired  Laryngeal Elevation Coordination: Appears impaired  (Please note: Silent aspiration cannot be evaluated clinically. Videofluoroscopic Swallow Study is required to rule-out silent aspiration.)      GOALS  Goal #1 The patient will tolerate PUREED consistency and MILDLY-THICK VIA TSP liquids without overt signs or symptoms of aspiration with 100 % accuracy over 1-2 session(s).    In Progress   Goal #2 The patient will utilize compensatory strategies as outlined by  BSSE (clinical evaluation) including Slow rate, Small bites, No straws, CONTROLLED liquids, Upright 90 degrees with moderate PRN feeding assistance 90% of the time across 4-5 sessions.    In Progress   Goal #3 The patient will tolerate trial upgrade of BITE SIZE/SOFT ETC/SOLID consistency and THIN liquids without overt signs or symptoms of aspiration with 100 % accuracy over 3-4 session(s).  In Progress   FOLLOW UP  Treatment Plan/Recommendations: Aspiration precautions  Duration: 2 weeks  Follow Up Needed (Documentation Required): Yes  SLP Follow-up Date: 02/22/25    Thank you for  your referral.   If you have any questions, please contact   Rajni Ziegler M.S. CCC/SLP  Speech-Language Pathologist  [facility]  #[phone]

## 2025-02-21 NOTE — PHYSICAL THERAPY NOTE
PHYSICAL THERAPY EVALUATION - INPATIENT     Room Number: 219/219-A  Evaluation Date: 2/21/2025  Type of Evaluation: Initial   Physician Order: PT Eval and Treat    Presenting Problem: delerium, malginant lung ca with brain mets  Co-Morbidities : lung CA with brain mets  Reason for Therapy: Mobility Dysfunction and Discharge Planning    PHYSICAL THERAPY ASSESSMENT   Patient is a 74 year old female admitted 2/20/2025 for syncope.  Prior to admission, patient's baseline is declining--w/c or bedbound at home x1 week, otherwise amb household distances with RW.  Patient is currently functioning below baseline with bed mobility, transfers, and gait.  Patient is requiring moderate assist and maximum assist as a result of the following impairments: decreased functional strength, decreased muscular endurance, and medical status.  Physical Therapy will continue to follow for duration of hospitalization.    Patient will benefit from continued skilled PT Services to promote return to prior level of function and safety with continuous assistance and gradual rehabilitative therapy . Anticipate that family will decline rehab to bring pt home with family assist and HHPT.    PLAN DURING HOSPITALIZATION  Nursing Mobility Recommendation : Lift Equipment  PT Device Recommendation: Rolling walker;Mechanical lift  PT Treatment Plan: Bed mobility;Body mechanics;Endurance;Energy conservation;Patient education;Family education;Gait training;Strengthening;Transfer training;Balance training  Rehab Potential : Fair  Frequency (Obs): 3-5x/week     PHYSICAL THERAPY MEDICAL/SOCIAL HISTORY       Problem List  Principal Problem:    Delirium, acute  Active Problems:    Dehydration    Primary malignant neoplasm of lung metastatic to other site, unspecified laterality (HCC)    Intracranial hemorrhage (HCC)    Acute hemorrhagic infarction of brain (HCC)      HOME SITUATION  Type of Home: House                        Lives With: Alone;Family (6 kids take  turns staying with her)    Drives: No   Patient Regularly Uses: Rolling walker;Wheelchair         SUBJECTIVE  I can open my eyes    PHYSICAL THERAPY EXAMINATION   OBJECTIVE  Precautions: Limb alert - left;Seizure;Bed/chair alarm  Fall Risk: High fall risk    WEIGHT BEARING RESTRICTION       PAIN ASSESSMENT  Rating: Unable to rate  Location: pain from K infusion earlier today  Management Techniques: Activity promotion;Repositioning    COGNITION  Pt limited by fatigue this session    RANGE OF MOTION AND STRENGTH ASSESSMENT  Upper extremity ROM and strength are within functional limits on R side, limited on L  Lower extremity ROM is within functional limits   Lower extremity strength is limited on L side--limited against gravity motion at ankle and knee and hip    BALANCE  Static Sitting: Fair -  Dynamic Sitting: Poor +  Static Standing: Not tested  Dynamic Standing: Not tested    ADDITIONAL TESTS                                    NEUROLOGICAL FINDINGS                      ACTIVITY TOLERANCE  Pulse: 104  Heart Rate Source: Monitor                   O2 WALK  Oxygen Therapy  SPO2% on Room Air at Rest: 97    AM-PAC '6-Clicks' INPATIENT SHORT FORM - BASIC MOBILITY  How much difficulty does the patient currently have...  Patient Difficulty: Turning over in bed (including adjusting bedclothes, sheets and blankets)?: A Lot   Patient Difficulty: Sitting down on and standing up from a chair with arms (e.g., wheelchair, bedside commode, etc.): A Lot   Patient Difficulty: Moving from lying on back to sitting on the side of the bed?: A Lot   How much help from another person does the patient currently need...   Help from Another: Moving to and from a bed to a chair (including a wheelchair)?: A Lot   Help from Another: Need to walk in hospital room?: A Lot   Help from Another: Climbing 3-5 steps with a railing?: A Lot     AM-PAC Score:  Raw Score: 12   Approx Degree of Impairment: 68.66%   Standardized Score (AM-PAC Scale): 35.33    CMS Modifier (G-Code): CL    FUNCTIONAL ABILITY STATUS  Functional Mobility/Gait Assessment  Gait Assistance: Not tested  Rolling: moderate assist  Supine to Sit: maximum assist  Sit to Supine: maximum assist  Sit to Stand:  NT    Exercise/Education Provided:  Bed mobility  Body mechanics  PT Eval    Skilled Therapy Provided: Pt ok to be seen per RN. Coordinated with OT to maximize pt rehab benefits and safety. Pt limited by fatigue, but willing to participate, supportive son at bedside. Pt mod to maxAx2 for bed mobility. Pt able to sit EOB with modA initially, progressed to CGA with cueing--but pt fatigued quickly when sitting EOB. Pt with limited vision, occ double vision per son at bedside. Pt denied double vision but demo'd difficulty reading a clock. OOB mobility deferred at this time, pt pending MRI soon and limited by lethargy.    The patient's Approx Degree of Impairment: 68.66% has been calculated based on documentation in the [facility] '6 clicks' Inpatient Basic Mobility Short Form.  Research supports that patients with this level of impairment may benefit from gradual rehab therapy, however, anticipate that pt and family will decline and go home with HHPT.  Final disposition will be made by interdisciplinary medical team.    Patient End of Session: In bed;Needs met;Call light within reach;RN aware of session/findings;All patient questions and concerns addressed;Hospital anti-slip socks;Family present    CURRENT GOALS  Goals to be met by: 3/7/25  Patient Goal Patient's self-stated goal is: not stated   Goal #1 Patient is able to demonstrate supine - sit EOB @ level: minimum assistance     Goal #1   Current Status    Goal #2 Patient is able to demonstrate transfers Sit to/from Stand at assistance level: minimum assistance with walker - rolling     Goal #2  Current Status    Goal #3 Patient is able to ambulate 10 feet with assist device: walker - rolling at assistance level: minimum assistance   Goal #3    Current Status    Goal #4    Goal #4   Current Status    Goal #5 Patient to demonstrate independence with home activity/exercise instructions provided to patient in preparation for discharge.   Goal #5   Current Status    Goal #6    Goal #6  Current Status      Patient Evaluation Complexity Level:  History Moderate - 1 or 2 personal factors and/or co-morbidities   Examination of body systems Moderate - addressing a total of 3 or more elements   Clinical Presentation  Moderate - Evolving   Clinical Decision Making  Moderate Complexity       Therapeutic Activity:  20 minutes

## 2025-02-21 NOTE — PLAN OF CARE
MRI done. Improved neuros from this am. Supplements/pureed diet. Need to feed slow. Coughs at times.     Problem: NEUROLOGICAL - ADULT  Goal: Achieves stable or improved neurological status  Description: INTERVENTIONS  - Assess for and report changes in neurological status  - Initiate measures to prevent increased intracranial pressure  - Maintain blood pressure and fluid volume within ordered parameters to optimize cerebral perfusion and minimize risk of hemorrhage  - Monitor temperature, glucose, and sodium. Initiate appropriate interventions as ordered  Outcome: Progressing  Goal: Absence of seizures  Description: INTERVENTIONS  - Monitor for seizure activity  - Administer anti-seizure medications as ordered  - Monitor neurological status  Outcome: Progressing  Goal: Remains free of injury related to seizure activity  Description: INTERVENTIONS:  - Maintain airway, patient safety  and administer oxygen as ordered  - Monitor patient for seizure activity, document and report duration and description of seizure to MD/LIP  - If seizure occurs, turn patient to side and suction secretions as needed  - Reorient patient post seizure  - Seizure pads on all 4 side rails  - Instruct patient/family to notify RN of any seizure activity  - Instruct patient/family to call for assistance with activity based on assessment  Outcome: Progressing

## 2025-02-21 NOTE — ED QUICK NOTES
Chest Xray from [facility] 1/28/25: Lines and tubes: Tip of a portacatheter is at the SVC/right atrium junction.   Verified Port with pt's port ID card.

## 2025-02-21 NOTE — PLAN OF CARE
Problem: Patient Centered Care  Goal: Patient preferences are identified and integrated in the patient's plan of care  Description: Interventions:  - What would you like us to know as we care for you? From home  - Provide timely, complete, and accurate information to patient/family  - Incorporate patient and family knowledge, values, beliefs, and cultural backgrounds into the planning and delivery of care  - Encourage patient/family to participate in care and decision-making at the level they choose  - Honor patient and family perspectives and choices  Outcome: Progressing     Problem: Patient/Family Goals  Goal: Patient/Family Long Term Goal  Description: Patient's Long Term Goal: Prevent more falls    Interventions:  - PT/OT, reorientation  - See additional Care Plan goals for specific interventions  Outcome: Progressing  Goal: Patient/Family Short Term Goal  Description: Patient's Short Term Goal: go home    Interventions:   - medication, imaging  - See additional Care Plan goals for specific interventions  Outcome: Progressing     Problem: NEUROLOGICAL - ADULT  Goal: Achieves stable or improved neurological status  Description: INTERVENTIONS  - Assess for and report changes in neurological status  - Initiate measures to prevent increased intracranial pressure  - Maintain blood pressure and fluid volume within ordered parameters to optimize cerebral perfusion and minimize risk of hemorrhage  - Monitor temperature, glucose, and sodium. Initiate appropriate interventions as ordered  Outcome: Progressing  Goal: Absence of seizures  Description: INTERVENTIONS  - Monitor for seizure activity  - Administer anti-seizure medications as ordered  - Monitor neurological status  Outcome: Progressing  Goal: Remains free of injury related to seizure activity  Description: INTERVENTIONS:  - Maintain airway, patient safety  and administer oxygen as ordered  - Monitor patient for seizure activity, document and report duration  and description of seizure to MD/LIP  - If seizure occurs, turn patient to side and suction secretions as needed  - Reorient patient post seizure  - Seizure pads on all 4 side rails  - Instruct patient/family to notify RN of any seizure activity  - Instruct patient/family to call for assistance with activity based on assessment  Outcome: Progressing  Goal: Achieves maximal functionality and self care  Description: INTERVENTIONS  - Monitor swallowing and airway patency with patient fatigue and changes in neurological status  - Encourage and assist patient to increase activity and self care with guidance from PT/OT  - Encourage visually impaired, hearing impaired and aphasic patients to use assistive/communication devices  Outcome: Progressing     Problem: Impaired Cognition  Goal: Patient will exhibit improved attention, thought processing and/or memory  Description: Interventions:  - Minimize distractions in the room when full attention is required  - Consider use of a daily journal to improve memory and reduce confusion related to daily events and orientation  - Allow additional time for processing after asking questions or providing instructions  Outcome: Progressing

## 2025-02-21 NOTE — CONSULTS
[facility]  [address] [place] [address], [address]  [facility] [address]  [phone]            Damari Hong Patient Status:  Inpatient    1950 MRN [id number]   Location [facility] 2W/SW Attending Vanessa Elam MD   Hosp Day # 1 PCP Shruthi Simon MD     Date of Admission:  2025  Date of Consult:  2025  Reason for Consultation:   Possible seizure    History of Present Illness:   Patient is a 74 year old female who was admitted to the hospital for Delirium, acute:  74-year-old female with a history of lung cancer, malignant pleural effusion, PE, chronic respiratory failure, and on home oxygen presented with a possible syncopal episode and new-onset left-sided weakness. The patient was found on the floor at 7 a.m. after rolling off the bed.,  EMS was called but patient was not brought to the hospital since apparently she was back to her baseline.    Later in the while at an outpatient infusion center, she experienced a possible seizure-like episode where she stopped answering questions, hyperventilated, and slumped over. The patient reports experiencing double vision and left-sided weakness since yesterday. She was diagnosed with cancer in October of the previous year, with the most recent MRI in January showing some reduction in brain metastasis size, apparently patient has undergone whole brain radiation some immunotherapy but she had significant reaction to Keytruda. The patient also had pulmonary embolism in December and has been on Eliquis since then.       Past Medical History  Past Medical History:    Depression    off Cymbalta    Esophageal reflux    HYPERLIPIDEMIA    OSTEOARTHRITIS    OTHER DISEASES    Hashimoto's--sees Dr. Palmer--Endo    OTHER DISEASES    DEXA-WNL     Reflux    Thyroid disease       Past Surgical History  Past Surgical History:   Procedure Laterality Date    Appendectomy      Colonoscopy      -  Ali--colonoscopy-normal--next 2018    Hysterectomy      partial hysterectomy-still with bilateral ovaries--due to proloapsed uterus--PAP's always normal    Knee replacement surgery      L-knee TKP-Dr. Carney 12/08'    Laparoscopic  4/24/2014    Procedure: LAPAROSCOPIC CHOLECYSTECTOMY WITH  CHOLANGIOGRAM   POSS OPEN;  Surgeon: Monik Simon MD;  Location: [facility]    Other surgical history      Arthroscopy    Other surgical history      both knee replacement       Family History  Family History   Problem Relation Age of Onset    Cancer Father         EtOH, Liver Dz    Cancer Mother         colon cancer and Alzheimer;s-diagnosed 68       Social History  Pediatric History   Patient Parents    Not on file     Other Topics Concern    Not on file   Social History Narrative    , lives alone, retired    -6 children    -no tobacco    -1-2 drinks at most/week           Current Medications:  Current Facility-Administered Medications   Medication Dose Route Frequency    LORazepam (Ativan) 2 mg/mL injection 1 mg  1 mg Intravenous Once PRN    sodium chloride 0.9% infusion   Intravenous Continuous    acetaminophen (Tylenol) tab 650 mg  650 mg Oral Q4H PRN    Or    acetaminophen (Tylenol) rectal suppository 650 mg  650 mg Rectal Q4H PRN    labetalol (Trandate) 5 mg/mL injection 10 mg  10 mg Intravenous Q10 Min PRN    hydrALAzine (Apresoline) 20 mg/mL injection 10 mg  10 mg Intravenous Q2H PRN    ondansetron (Zofran) 4 MG/2ML injection 4 mg  4 mg Intravenous Q6H PRN    Or    metoclopramide (Reglan) 5 mg/mL injection 10 mg  10 mg Intravenous Q8H PRN    levETIRAcetam (Keppra) 500 mg/5mL injection 500 mg  500 mg Intravenous Q12H    sodium chloride 0.9% infusion   Intravenous Continuous     Medications Prior to Admission   Medication Sig    apixaban 5 MG Oral Tab Take 1 tablet (5 mg total) by mouth 2 (two) times daily.    albuterol 108 (90 Base) MCG/ACT Inhalation Aero Soln Inhale 1 puff into the lungs every 4 to  6 hours as needed for Shortness of Breath.    acetaminophen 325 MG Oral Tab Take 2 tablets (650 mg total) by mouth every 4 (four) hours as needed for Pain.    benzonatate 100 MG Oral Cap Take 1 tablet by mouth 3 (three) times daily as needed for cough.    Biotin 1000 MCG Oral Tab Take 1,000 mcg by mouth daily.    folic acid 1 MG Oral Tab Take 1 tablet (1 mg total) by mouth daily.    mirtazapine 7.5 MG Oral Tab Take 1 tablet (7.5 mg total) by mouth nightly.    potassium chloride 20 MEQ Oral Tab CR Take 1 tablet (20 mEq total) by mouth daily.    predniSONE 20 MG Oral Tab Take 0.5 tablets (10 mg total) by mouth daily.    Omeprazole 40 MG Oral Capsule Delayed Release Take 1 capsule (40 mg total) by mouth daily. Before meal    UNITHROID 50 MCG Oral Tab Take 1 tablet (50 mcg total) by mouth daily.    Cholecalciferol (VITAMIN D) 2000 UNITS Oral Tab Take 5,000 Units by mouth daily.       Allergies  Allergies[1]    Review of Systems:   As in HPI, the rest of the 14 system review was done and was negative    Physical Exam:     Vitals:    02/21/25 0000 02/21/25 0200 02/21/25 0400 02/21/25 0600   BP: 117/73 126/81 121/69 130/89   Pulse: 107 106 99 105   Resp: 25 20 17 18   Temp: 97.9 °F (36.6 °C)  97.3 °F (36.3 °C)    TempSrc: Temporal  Temporal    SpO2: 97% 95% 97% 94%   Weight:           General: No apparent distress, well nourished, well groomed.  Head- Normocephalic, atraumatic  Eyes- No redness or swelling  ENT- Hearing intake, smell preserved, normal glutition  Neck- No masses or adenopathy  Cv: pulses were palpable and normal, no cyanosis or edema     Neurological:     Mental Status- Alert and possible aphasia and dysarthria, able to answer some simple questions but had difficulty expressing herself in more details.    Cranial Nerves:  II.- Visual fields full to confrontation    III, IV, VI- EOM exhibits nystagmus in both directions somewhat occasional  V. Facial sensation intact  VII. Face possible slight asymmetry on  the left side but patient apparently kept closing either eye to avoid diplopia.  VIII. Hearing intact.  IX. Pallet elevates symmetrically.  XI. Shoulder shrug is intact  XII. Tongue is midline    Motor Exam:  Muscle tone normal  No atrophy or fasciculations  Strength- upper extremities 5/5 proximally and distally on the right side and 3 out of 5 on the left side                  - lower  extremities 5/5 proximally and distally on the right side and 4- out of 5 on the left side      Results:     Laboratory Data:  Lab Results   Component Value Date    WBC 5.4 02/21/2025    HGB 10.4 (L) 02/21/2025    HCT 30.2 (L) 02/21/2025    .0 (L) 02/21/2025    CREATSERUM 0.62 02/21/2025    BUN 16 02/21/2025     02/21/2025    K 3.8 02/21/2025     02/21/2025    CO2 23.0 02/21/2025    GLU 92 02/21/2025    CA 7.7 (L) 02/21/2025    ALB 4.8 03/23/2022    ALKPHO 106 03/23/2022    TP 7.4 03/23/2022    AST 22 03/23/2022    ALT 19 03/23/2022    INR 1.0 09/21/2010    T4F 1.06 09/27/2021    TSH 2.460 09/27/2021    LONA 67 03/23/2022    LIP 54 03/23/2022    ESRML 14 09/27/2021    CRP <0.30 09/27/2021         Imaging:    CT SPINE CERVICAL (CPT=72125)    Result Date: 2/20/2025  CONCLUSION:  1. No acute fracture or posttraumatic malalignment cervical spine.  2. Numerous osteoblastic presumed osseous metastases.  3. Multilevel degenerative changes of the cervical spine are apparent.  4. Lesser incidental findings as above.    Dictated by (CST): Rayo Curiel MD on 2/20/2025 at 12:59 PM     Finalized by (CST): Rayo Curiel MD on 2/20/2025 at 1:02 PM          CT BRAIN OR HEAD (CPT=70450)    Result Date: 2/20/2025  CONCLUSION: Very small subtle area of decreased attenuation within the left frontoparietal lobe with small focal area of of peripheral increased density.  The findings could represent a hemorrhagic metastasis.  However this is an equivocal finding.  There is no mass effect or associated abnormality.  There are no  similar appearing areas within the brain. The remainder of the exam is unremarkable.    Dictated by (CST): Aki Morrison MD on 2/20/2025 at 12:45 PM     Finalized by (CST): Aki Morrison MD on 2/20/2025 at 12:52 PM         EKG    Result Date: 2/20/2025  Sinus tachycardia Left atrial enlargement Nonspecific ST abnormality Abnormal ECG No previous ECGs found in [facility] Confirmed by LILO WOOTEN JORDAN ([id number]) on 2/20/2025 12:28:43 PM   CT of the head was independent reviewed, very subtle decrease in irritation with possible increased density within and within the left frontoparietal lobe and therefore possibility of hemorrhagic metastasis was entertained    Impression:       Possible Seizure:  - Patient experienced a seizure-like episode and new-onset left-sided weakness  - Found on the floor at 7 a.m. after rolling off the bed  - Episode of hyperventilation and slumping over at outpatient center  - Perform EEG to evaluate for seizure activity  - Continue Keppra for seizure prophylaxis  - Hold Eliquis due to possible hemorrhagic component of metastasis  - Differential diagnoses include postictal state, intracranial hemorrhage, or progression of brain metastases    Left-sided Weakness:  - New-onset left-sided weakness developed yesterday  - Difficulty with left arm and leg movement reported  - Patient reports double vision  - Conduct speech therapy evaluation for potential speech and swallowing issues    Lung Cancer with Brain Metastases:  - Diagnosed with lung cancer in October of the previous year  - Most recent MRI in January showed some reduction in tumor size but persistent disease  - Concern for brain metastases contributing to current neurological symptoms  - Perform brain MRI to assess current status of metastases and potential hemorrhage      Thank you for allowing me to participate in the care of your patient.    Uche Tracy MD  2/21/2025           [1] No Known Allergies

## 2025-02-21 NOTE — PROCEDURES
EEG report    REFERRING PHYSICIAN: Vanessa Elam MD  PCP and phone number:  Shruthi Simon MD  [phone]    TECHNIQUE: 21 channels of EEG, 2 channels of EOG, and 1 channel of EKG were recorded utilizing the International 10/20 System. The recording was performed in a digitized monopolar referential format and playback was reformatted into various referential and bipolar montages utilizing appropriate filter settings. Automatic seizure and spike detection programs were utilized throughout the recording.  Video was recorded during the study    CLINICAL DATA:  Patient is sent for the evaluation of possible seizures.    MEDICATION:  Continuous Medications:   sodium chloride      sodium chloride 75 mL/hr at 02/20/25 5373     Scheduled Medications:  No current outpatient medications on file.  PRN Medications:    LORazepam    acetaminophen **OR** acetaminophen    labetalol    hydrALAzine    ondansetron **OR** metoclopramide    albuterol    ACTIVATION:  Hyperventilation: Not done  Photic stimulation: Not done  Sleep: Normal sleep architecture was seen. Stage 1    BACKGROUND  While the patient was awake, the posterior dominant rhythm consisted of well-regulated 9-10 Hz rhythmic waveforms, symmetrically distributed over both posterior quadrants and was reactive to eye opening.    EEG ABNORMALITY  At times background would become more disturbed in the left hemisphere, with slow sharply contoured semirhythmic up to 1 Hz wave activity    IMPRESSION:    This is an abnormal EEG. Focal slowing, coupled with possible epileptiform discharges were seen in the left hemisphere. This constellation of findings is consistent with an abnormality in the left hemisphere that is potentially epileptogenic in nature.

## 2025-02-21 NOTE — PROGRESS NOTES
[facility] Hospitalist Progress Note     CC: Hospital Follow up    PCP: Shruthi Simon MD       Assessment/Plan:     Principal Problem:    Delirium, acute  Active Problems:    Dehydration    Primary malignant neoplasm of lung metastatic to other site, unspecified laterality (HCC)    Intracranial hemorrhage (HCC)    Acute hemorrhagic infarction of brain (HCC)      Ms. 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      ?Hemorrhagic metastasis   L sided weakness  Syncope  - noted on CT head  - hold Eliquis  - MRI brain ordered in ER  - NSGY and neurology consulted  - d/w Dr. Tracy, continue IV keppra   - neurochecks    Lung Ca with recurrent malignant pleural effusion  PE hx   Small R PTX - POA   Chronic hypoxic respiratory failure on home O2  - S/p R sided PleurX placement 1/31/25  - drain scheduled MWF 9:30AM, up to 1L  - due to drain tomorrow  - Already has 2L O2 at home - at baseline now     Lung cancer with brain mets  - follow-up oncology as outpatient  - planned to start chemotherapy this week    - per family, had recent MRI brain a few weeks ago that showed decrease in size of brain mets  - has been on prednisone taper since November, currently on 10 mg daily    DVT/PE  - hold Eliquis     Hypothyroidism  - Continue synthroid     ACP  - CODE- DNR/full- confirmed with POA  - POA- son- updated at bedside   - lives at home with son     FN:  - IVF: none  - Diet: regular     DVT Prophy: SCD  Lines: PVI     Dispo: pending clinical course     Outpatient records or previous hospital records reviewed.      Further recommendations pending patient's clinical course.  [facility] hospitalist to continue to follow patient while in house     Patient and/or patient's family given opportunity to ask questions and note understanding and  agreeing with therapeutic plan as outlined     Vanessa Elam MD  [facility] Hospitalist  Answering Service number: [phone]     Subjective:     No new neuro changes, still with L sided weakness. Worked with SLP this AM and was doing better per son but got very fatigued, napping now.     OBJECTIVE:    Blood pressure 117/70, pulse 100, temperature 97 °F (36.1 °C), temperature source Temporal, resp. rate 22, weight 128 lb 1.4 oz (58.1 kg), SpO2 95%, not currently breastfeeding.    Temp:  [97 °F (36.1 °C)-99.3 °F (37.4 °C)] 97 °F (36.1 °C)  Pulse:  [] 100  Resp:  [17-32] 22  BP: (101-130)/(66-89) 117/70  SpO2:  [92 %-98 %] 95 %      Intake/Output:    Intake/Output Summary (Last 24 hours) at 2/21/2025 1018  Last data filed at 2/21/2025 0531  Gross per 24 hour   Intake 1568.2 ml   Output 200 ml   Net 1368.2 ml       Last 3 Weights   02/20/25 2050 128 lb 1.4 oz (58.1 kg)   02/20/25 1146 162 lb 0.6 oz (73.5 kg)   03/23/22 0942 162 lb (73.5 kg)   01/12/22 1248 160 lb (72.6 kg)       Exam  GEN: female in NAD, slow slurred speech with some word finding difficulty, appears tired  HEENT: EOMI, PERRLA, L eyelid shut- baseline per family  Pulm: CTAB, no crackles or wheezes  CV: RRR, no murmurs  ABD: Soft, non-tender, non-distended, +BS  MSK:  LUE 2/5, L hand  3/5, LLE 3/5, RUE 5/5, RLE 4/5  Neuro: weakness as above, CN intact, sensory intact, L side neglect  Psych: Affect- normal  SKIN: warm, dry  EXT: no edema    Data Review:       Labs:     Recent Labs   Lab 02/20/25  1136 02/21/25  0346   RBC 3.55* 2.96*   HGB 12.2 10.4*   HCT 35.7 30.2*   .6* 102.0*   MCH 34.4* 35.1*   MCHC 34.2 34.4   RDW 13.0 13.2   NEPRELIM 6.59  --    WBC 7.7 5.4   .0* 115.0*         Recent Labs   Lab 02/20/25  1136 02/21/25  0346   * 92   BUN 22 16   CREATSERUM 0.71 0.62   EGFRCR 89 93   CA 8.3* 7.7*    144   K 3.7 3.8    111   CO2 19.0* 23.0       No results for input(s): \"ALT\", \"AST\", \"ALB\", \"AMYLASE\",  \"LIPASE\", \"LDH\" in the last 168 hours.    Invalid input(s): \"ALPHOS\", \"TBIL\", \"DBIL\", \"TPROT\"      Imaging:  CT SPINE CERVICAL (CPT=72125)    Result Date: 2/20/2025  CONCLUSION:  1. No acute fracture or posttraumatic malalignment cervical spine.  2. Numerous osteoblastic presumed osseous metastases.  3. Multilevel degenerative changes of the cervical spine are apparent.  4. Lesser incidental findings as above.    Dictated by (CST): Rayo Curiel MD on 2/20/2025 at 12:59 PM     Finalized by (CST): Rayo Curiel MD on 2/20/2025 at 1:02 PM          CT BRAIN OR HEAD (CPT=70450)    Result Date: 2/20/2025  CONCLUSION: Very small subtle area of decreased attenuation within the left frontoparietal lobe with small focal area of of peripheral increased density.  The findings could represent a hemorrhagic metastasis.  However this is an equivocal finding.  There is no mass effect or associated abnormality.  There are no similar appearing areas within the brain. The remainder of the exam is unremarkable.    Dictated by (CST): Aki Morrison MD on 2/20/2025 at 12:45 PM     Finalized by (CST): Aki Morrison MD on 2/20/2025 at 12:52 PM             Meds:     INPATIENT MEDICATIONS    Scheduled Medications:      famotidine, 20 mg, Daily   Or  famotidine, 20 mg, Daily  mirtazapine, 7.5 mg, Nightly  predniSONE, 10 mg, Daily  levothyroxine, 50 mcg, Daily  potassium chloride, 20 mEq, Once  levETIRAcetam, 500 mg, Q12H            Drips:  sodium chloride, Last Rate: 75 mL/hr at 02/21/25 0700  sodium chloride, Last Rate: 75 mL/hr at 02/20/25 2133        PRN Medications  LORazepam, 1 mg, Once PRN  acetaminophen, 650 mg, Q4H PRN   Or  acetaminophen, 650 mg, Q4H PRN  labetalol, 10 mg, Q10 Min PRN  hydrALAzine, 10 mg, Q2H PRN  ondansetron, 4 mg, Q6H PRN   Or  metoclopramide, 10 mg, Q8H PRN  albuterol, 1 puff, Q6H PRN

## 2025-02-21 NOTE — OCCUPATIONAL THERAPY NOTE
OCCUPATIONAL THERAPY EVALUATION - INPATIENT     Room Number: 219/219-A  Evaluation Date: 2/21/2025  Type of Evaluation: Initial  Presenting Problem: acute delirium    Physician Order: IP Consult to Occupational Therapy  Reason for Therapy: ADL/IADL Dysfunction and Discharge Planning    OCCUPATIONAL THERAPY ASSESSMENT   Patient is a 74 year old female admitted 2/20/2025 for syncope in setting of  lung CA with brain mets. Prior to admission, patient's baseline is most recently limited to xfers only and assists with ADls. Pt was ambulatory house hold distances ~1 week ago using either R/W or rollator. Prior to that, I and driving in October 2024. Pt lives alone in a house. Pt has 6 kids- 1 in the area. Kids have been taking turns to stay with pt.   Patient is currently functioning below baseline with ADLs and functional mobility.  Patient is requiring maximum assist as a result of the following impairments: decreased functional strength, decreased endurance, pain, impaired sit balance, impaired coordination, decreased muscular endurance, medical status, and visual deficits. Occupational Therapy will continue to follow for duration of hospitalization.    Patient will benefit from continued skilled OT Services to promote return to prior level of function and safety with continuous assistance and gradual rehabilitative therapy.    PLAN DURING HOSPITALIZATION     OT Treatment Plan: Balance activities;Energy conservation/work simplification techniques;ADL training;Functional transfer training;UE strengthening/ROM;Endurance training;Patient/Family education;Patient/Family training;Equipment eval/education;Compensatory technique education     OCCUPATIONAL THERAPY MEDICAL/SOCIAL HISTORY   Problem List  Principal Problem:    Delirium, acute  Active Problems:    Dehydration    Primary malignant neoplasm of lung metastatic to other site, unspecified laterality (HCC)    Intracranial hemorrhage (HCC)    Acute hemorrhagic infarction  of brain (HCC)    HOME SITUATION  Type of Home: House  Lives With: Family (pt lives alone; has 6 kids who take turns staying with her)  Drives: -- (Not recently)      Use of Assistive Device(s): most recently xfers only using transport chair or w/c. ~1 week ago managing household distances with R/W.    Prior Level of [place]: Prior to admission, patient's baseline is most recently limited to xfers only and assists with ADls. Pt was ambulatory house hold distances ~1 week ago using either R/W or rollator. Prior to that, I and driving in October 2024. Pt lives alone in a house. Pt has 6 kids- 1 in the area. Kids have been taking turns to stay with pt.    SUBJECTIVE  \"I can try\"    OCCUPATIONAL THERAPY EXAMINATION      OBJECTIVE  Precautions: Limb alert - left; Seizure; Bed/chair alarm; Aspiration  Fall Risk: High fall risk      PAIN ASSESSMENT  Rating: -- (intermittent c/o not rated)  Location: LT UE/IV site (son reports pt is having pain d/t K+ infusion)  Management Techniques: Repositioning      ACTIVITY TOLERANCE  Pulse: 102     Resp: (!) 29           Patient Position: Sitting    O2 SATURATIONS  Oxygen Therapy  SPO2% on Room Air at Rest: 96 (seated EOB)    COGNITION  Pt is lethargic but once engaged, able to maintain eyes open  Pt follows 1 step commands consistently    VISION  Pt denies deficits however demo impaired acuity    Communication: pt is soft spoken, speech can become slurred and requires repetition as pt becomes increasingly fatigued    Behavioral/Emotional/Social: Very pleasant and cooperative    RANGE OF MOTION   RT Upper extremity ROM is within functional limits   LT UE PROM is WNL    STRENGTH ASSESSMENT  RT Upper extremity strength is within functional limits   LT  is poor  LT fingers and wrist 3/5, LT elbow 3/5, LT shd 2-/5    COORDINATION  Gross Motor:impaired LT  Fine Motor:impaired LT    ACTIVITIES OF DAILY LIVING ASSESSMENT  AM-PAC ‘6-Clicks’ Inpatient Daily Activity Short Form  How  much help from another person does the patient currently need…  -   Putting on and taking off regular lower body clothing?: A Lot  -   Bathing (including washing, rinsing, drying)?: A Lot  -   Toileting, which includes using toilet, bedpan or urinal? : Total  -   Putting on and taking off regular upper body clothing?: A Lot  -   Taking care of personal grooming such as brushing teeth?: A Little  -   Eating meals?: A Little    AM-PAC Score:  Score: 13  Approx Degree of Impairment: 63.03%  Standardized Score (AM-PAC Scale): 32.03  CMS Modifier (G-Code): CL    FUNCTIONAL TRANSFER ASSESSMENT  Sit to Stand: -- (deferred at this time)    BED MOBILITY  Rolling: Moderate Assist  Supine to Sit : Maximum Assist  Sit to Supine (OT): Maximum Assist  Scooting: -- (Max A)    BALANCE ASSESSMENT  Static Sitting: -- (pt tolerates ~12 minutes seated EOB initially with Mod A, improving to CGA; fatigues quickly)    FUNCTIONAL ADL ASSESSMENT  Feeding: NT  Grooming: Min A for simple bed level grooming  UE self care: Max A  LE self care: Max A  Toileting: dependent    Skilled Therapy Provided: Pt received resting in bed with her supportive son, Brayan, at bed side. Pt remains lethargic but participates in all requested tasks from bed level.   Pt demo significant LT sided weakness with impaired balance, coordination, activity tolerance, vision which impact her performance with ADls and functional mobility.    EDUCATION PROVIDED  Patient Education : Role of Occupational Therapy; Plan of Care; Discharge Recommendations; Posture/Positioning  Patient's Response to Education: Verbalized Understanding; Requires Further Education  Family/Caregiver's Response to Education: Verbalized Understanding; Requires Further Education    The patient's Approx Degree of Impairment: 63.03% has been calculated based on documentation in the [facility] '6 clicks' Inpatient Daily Activity Short Form.  Research supports that patients with this level of impairment may  benefit from SHAR.  Final disposition will be made by interdisciplinary medical team.     Patient End of Session: In bed;Needs met;Call light within reach;RN aware of session/findings;Family present    OT Goals  Patients self stated goal is: not indicated     Patient will complete functional transfer with min A  Comment:     Patient will complete UE dressing with Min A  Comment:     Patient will tolerate standing for 2 minutes in prep for adls with Min A   Comment:    Patient will complete LE dressing with Min A  Comment:          Goals  on: 3/7/25  Frequency: 3-5x/week    Patient Evaluation Complexity Level:   Occupational Profile/Medical History MODERATE - Expanded review of history including review of medical or therapy record   Specific performance deficits impacting engagement in ADL/IADL MODERATE  3 - 5 performance deficits   Client Assessment/Performance Deficits MODERATE - Comorbidities and min to mod modifications of tasks    Clinical Decision Making MODERATE - Analysis of occupational profile, detailed assessments, several treatment options    Overall Complexity MODERATE       Self-Care Home Management: 15 minutes

## 2025-02-22 LAB
ANION GAP SERPL CALC-SCNC: 9 MMOL/L (ref 0–18)
BUN BLD-MCNC: 11 MG/DL (ref 9–23)
BUN/CREAT SERPL: 20.4 (ref 10–20)
CALCIUM BLD-MCNC: 7.8 MG/DL (ref 8.7–10.4)
CHLORIDE SERPL-SCNC: 113 MMOL/L (ref 98–112)
CO2 SERPL-SCNC: 22 MMOL/L (ref 21–32)
CREAT BLD-MCNC: 0.54 MG/DL
DEPRECATED RDW RBC AUTO: 47 FL (ref 35.1–46.3)
EGFRCR SERPLBLD CKD-EPI 2021: 96 ML/MIN/1.73M2 (ref 60–?)
ERYTHROCYTE [DISTWIDTH] IN BLOOD BY AUTOMATED COUNT: 13 % (ref 11–15)
GLUCOSE BLD-MCNC: 124 MG/DL (ref 70–99)
GLUCOSE BLDC GLUCOMTR-MCNC: 81 MG/DL (ref 70–99)
HCT VFR BLD AUTO: 30.8 %
HGB BLD-MCNC: 10.9 G/DL
MCH RBC QN AUTO: 35.3 PG (ref 26–34)
MCHC RBC AUTO-ENTMCNC: 35.4 G/DL (ref 31–37)
MCV RBC AUTO: 99.7 FL
OSMOLALITY SERPL CALC.SUM OF ELEC: 299 MOSM/KG (ref 275–295)
PLATELET # BLD AUTO: 124 10(3)UL (ref 150–450)
POTASSIUM SERPL-SCNC: 3.7 MMOL/L (ref 3.5–5.1)
POTASSIUM SERPL-SCNC: 4.2 MMOL/L (ref 3.5–5.1)
RBC # BLD AUTO: 3.09 X10(6)UL
SODIUM SERPL-SCNC: 144 MMOL/L (ref 136–145)
WBC # BLD AUTO: 5.9 X10(3) UL (ref 4–11)

## 2025-02-22 PROCEDURE — 99231 SBSQ HOSP IP/OBS SF/LOW 25: CPT | Performed by: NEUROLOGICAL SURGERY

## 2025-02-22 PROCEDURE — 99233 SBSQ HOSP IP/OBS HIGH 50: CPT | Performed by: OTHER

## 2025-02-22 RX ORDER — POTASSIUM CHLORIDE 14.9 MG/ML
20 INJECTION INTRAVENOUS ONCE
Status: COMPLETED | OUTPATIENT
Start: 2025-02-22 | End: 2025-02-22

## 2025-02-22 NOTE — PLAN OF CARE
Double RN skin check done prior to transfer off Unit. Skin check performed by this RN and AUGUSTA Saldaña.    Wounds are as followed: Excoriation/redness to buttocks/sacrum.    Will remain available for any further questions or concerns. Daughter at bedside and aware of transfer to 2. All belongings are with patient. Report given to AUGUSTA Quach.

## 2025-02-22 NOTE — PLAN OF CARE
Problem: Patient Centered Care  Goal: Patient preferences are identified and integrated in the patient's plan of care  Description: Interventions:  - What would you like us to know as we care for you? \"My family helps take care of me.\"  - Provide timely, complete, and accurate information to patient/family  - Incorporate patient and family knowledge, values, beliefs, and cultural backgrounds into the planning and delivery of care  - Encourage patient/family to participate in care and decision-making at the level they choose  - Honor patient and family perspectives and choices  2/22/2025 1319 by Naveed Bowen RN  Outcome: Progressing  2/22/2025 1319 by Naveed Bowen RN  Outcome: Progressing     Problem: Patient/Family Goals  Goal: Patient/Family Long Term Goal  Description: Patient's Long Term Goal: Feel better    Interventions:  - Educate on taking medications as prescribed  - Monitor vitals  -Up as tolerated  - See additional Care Plan goals for specific interventions  2/22/2025 1319 by Naveed Bowen RN  Outcome: Progressing  2/22/2025 1319 by Naveed Bowen RN  Outcome: Progressing  Goal: Patient/Family Short Term Goal  Description: Patient's Short Term Goal: No syncopal episodes    Interventions:   - EKG  - EEG  - Neurology consult  - Monitor vitals  - MRI  - See additional Care Plan goals for specific interventions  2/22/2025 1319 by Naveed Bowen RN  Outcome: Progressing  2/22/2025 1319 by Naveed Bowen RN  Outcome: Progressing     Problem: NEUROLOGICAL - ADULT  Goal: Achieves stable or improved neurological status  Description: INTERVENTIONS  - Assess for and report changes in neurological status  - Initiate measures to prevent increased intracranial pressure  - Maintain blood pressure and fluid volume within ordered parameters to optimize cerebral perfusion and minimize risk of hemorrhage  - Monitor temperature, glucose, and sodium. Initiate appropriate interventions as ordered  2/22/2025 1319 by Jessi  AUGUSTA Lucio  Outcome: Progressing  2/22/2025 1319 by Naveed Bowen RN  Outcome: Progressing  Goal: Absence of seizures  Description: INTERVENTIONS  - Monitor for seizure activity  - Administer anti-seizure medications as ordered  - Monitor neurological status  2/22/2025 1319 by Naveed Bowen RN  Outcome: Progressing  2/22/2025 1319 by Naveed Bowen RN  Outcome: Progressing  Goal: Remains free of injury related to seizure activity  Description: INTERVENTIONS:  - Maintain airway, patient safety  and administer oxygen as ordered  - Monitor patient for seizure activity, document and report duration and description of seizure to MD/LIP  - If seizure occurs, turn patient to side and suction secretions as needed  - Reorient patient post seizure  - Seizure pads on all 4 side rails  - Instruct patient/family to notify RN of any seizure activity  - Instruct patient/family to call for assistance with activity based on assessment  2/22/2025 1319 by Naveed Bowen RN  Outcome: Progressing  2/22/2025 1319 by Naveed Bowen RN  Outcome: Progressing  Goal: Achieves maximal functionality and self care  Description: INTERVENTIONS  - Monitor swallowing and airway patency with patient fatigue and changes in neurological status  - Encourage and assist patient to increase activity and self care with guidance from PT/OT  - Encourage visually impaired, hearing impaired and aphasic patients to use assistive/communication devices  2/22/2025 1319 by Naveed Bowen RN  Outcome: Progressing  2/22/2025 1319 by Naveed Bowen RN  Outcome: Progressing     Problem: Impaired Cognition  Goal: Patient will exhibit improved attention, thought processing and/or memory  Description: Interventions:  2/22/2025 1319 by Naveed Bowen RN  Outcome: Progressing  2/22/2025 1319 by Naveed Bowen RN  Outcome: Progressing     Problem: PAIN - ADULT  Goal: Verbalizes/displays adequate comfort level or patient's stated pain goal  Description: INTERVENTIONS:  - Encourage pt  to monitor pain and request assistance  - Assess pain using appropriate pain scale  - Administer analgesics based on type and severity of pain and evaluate response  - Implement non-pharmacological measures as appropriate and evaluate response  - Consider cultural and social influences on pain and pain management  - Manage/alleviate anxiety  - Utilize distraction and/or relaxation techniques  - Monitor for opioid side effects  - Notify MD/LIP if interventions unsuccessful or patient reports new pain  - Anticipate increased pain with activity and pre-medicate as appropriate  Outcome: Progressing     Problem: SAFETY ADULT - FALL  Goal: Free from fall injury  Description: INTERVENTIONS:  - Assess pt frequently for physical needs  - Identify cognitive and physical deficits and behaviors that affect risk of falls.  - [place] fall precautions as indicated by assessment.  - Educate pt/family on patient safety including physical limitations  - Instruct pt to call for assistance with activity based on assessment  - Modify environment to reduce risk of injury  - Provide assistive devices as appropriate  - Consider OT/PT consult to assist with strengthening/mobility  - Encourage toileting schedule  Outcome: Progressing     Problem: DISCHARGE PLANNING  Goal: Discharge to home or other facility with appropriate resources  Description: INTERVENTIONS:  - Identify barriers to discharge w/pt and caregiver  - Include patient/family/discharge partner in discharge planning  - Arrange for needed discharge resources and transportation as appropriate  - Identify discharge learning needs (meds, wound care, etc)  - Arrange for interpreters to assist at discharge as needed  - Consider post-discharge preferences of patient/family/discharge partner  - Complete POLST form as appropriate  - Assess patient's ability to be responsible for managing their own health  - Refer to Case Management Department for coordinating discharge planning if the  patient needs post-hospital services based on physician/LIP order or complex needs related to functional status, cognitive ability or social support system  Outcome: Progressing

## 2025-02-22 NOTE — PROGRESS NOTES
[facility]  [address], [address]  [facility] [address]  [phone]            Damari Hong Patient Status:  Inpatient    1950 MRN [id number]   Location [facility] 2W/SW Attending Vanessa Elam MD   Hosp Day # 2 PCP Shruthi Simon MD     Subjective:  Damari Hong is a(n) 74 year old female.    Her son at bedside, I saw her while in the ICU  Reviewed MRI brain with and without contrast with her son  No further seizure episodes, remains weak in left extremities    Current Facility-Administered Medications   Medication Dose Route Frequency    sodium chloride 0.9% infusion   Intravenous Continuous    acetaminophen (Tylenol) tab 650 mg  650 mg Oral Q4H PRN    Or    acetaminophen (Tylenol) rectal suppository 650 mg  650 mg Rectal Q4H PRN    labetalol (Trandate) 5 mg/mL injection 10 mg  10 mg Intravenous Q10 Min PRN    hydrALAzine (Apresoline) 20 mg/mL injection 10 mg  10 mg Intravenous Q2H PRN    famotidine (Pepcid) tab 20 mg  20 mg Oral Daily    Or    famotidine (Pepcid) 20 mg/2mL injection 20 mg  20 mg Intravenous Daily    ondansetron (Zofran) 4 MG/2ML injection 4 mg  4 mg Intravenous Q6H PRN    Or    metoclopramide (Reglan) 5 mg/mL injection 10 mg  10 mg Intravenous Q8H PRN    albuterol (Ventolin HFA) 108 (90 Base) MCG/ACT inhaler 1 puff  1 puff Inhalation Q6H PRN    mirtazapine (Remeron) tab 7.5 mg  7.5 mg Oral Nightly    predniSONE (Deltasone) tab 10 mg  10 mg Oral Daily    levothyroxine (Synthroid) tab 50 mcg  50 mcg Oral Daily    levETIRAcetam (Keppra) 500 mg/5mL injection 500 mg  500 mg Intravenous Q12H       Objective:  Blood pressure 113/84, pulse 111, temperature 97 °F (36.1 °C), temperature source Temporal, resp. rate (!) 28, weight 126 lb 8.7 oz (57.4 kg), SpO2 96%, not currently breastfeeding.    Physical Exam:  Vitals:    25 0500 25 0600 25 0800 25 1000   BP: 103/66 140/80 111/80 113/84   Pulse: 99 116 101 111    Resp: 24 24 23 (!) 28   Temp:   97 °F (36.1 °C)    TempSrc:   Temporal    SpO2: 95% 95% 94% 96%   Weight:           General, comfortable not in acute distress, denied pain  Neurological exam  Pupils equal and reactive to light, extraocular movements are intact, left mild side facial droop, tongue protrudes in the midline  Neck is supple  She is weak in proximal and distal muscles of her left upper left lower extremities  Intact motor power on the right hemibody      Lab Results   Component Value Date    WBC 5.4 02/21/2025    HGB 10.4 (L) 02/21/2025    HCT 30.2 (L) 02/21/2025    .0 (L) 02/21/2025    CREATSERUM 0.62 02/21/2025    BUN 16 02/21/2025     02/21/2025    K 3.7 02/22/2025     02/21/2025    CO2 23.0 02/21/2025    GLU 92 02/21/2025    CA 7.7 (L) 02/21/2025    ALB 4.8 03/23/2022    ALKPHO 106 03/23/2022    BILT 0.52 03/23/2022    TP 7.4 03/23/2022    AST 22 03/23/2022    ALT 19 03/23/2022    INR 1.0 09/21/2010    T4F 1.06 09/27/2021    TSH 2.460 09/27/2021    LONA 67 03/23/2022    LIP 54 03/23/2022    ESRML 14 09/27/2021    CRP <0.30 09/27/2021       MRI BRAIN (W+WO) (CPT=70553)    Result Date: 2/21/2025  CONCLUSION:  1. Multiple supra tentorial brain metastases, right paramedian midbrain metastasis, and multiple small cerebellar metastases.  No large area of vasogenic edema/significant mass effect or midline shift.  No hydrocephalus.  No acute infarct.    Dictated by (CST): Nasir Jamil MD on 2/21/2025 at 6:17 PM     Finalized by (CST): Nasir Jamil MD on 2/21/2025 at 6:27 PM          CT SPINE CERVICAL (CPT=72125)    Result Date: 2/20/2025  CONCLUSION:  1. No acute fracture or posttraumatic malalignment cervical spine.  2. Numerous osteoblastic presumed osseous metastases.  3. Multilevel degenerative changes of the cervical spine are apparent.  4. Lesser incidental findings as above.    Dictated by (CST): Rayo Curiel MD on 2/20/2025 at 12:59 PM     Finalized by (CST): Moisés  MD Rayo on 2/20/2025 at 1:02 PM          CT BRAIN OR HEAD (CPT=70450)    Result Date: 2/20/2025  CONCLUSION: Very small subtle area of decreased attenuation within the left frontoparietal lobe with small focal area of of peripheral increased density.  The findings could represent a hemorrhagic metastasis.  However this is an equivocal finding.  There is no mass effect or associated abnormality.  There are no similar appearing areas within the brain. The remainder of the exam is unremarkable.    Dictated by (CST): Aki Morrison MD on 2/20/2025 at 12:45 PM     Finalized by (CST): Aki Morrison MD on 2/20/2025 at 12:52 PM         EKG    Result Date: 2/20/2025  Sinus tachycardia Left atrial enlargement Nonspecific ST abnormality Abnormal ECG No previous ECGs found in [facility] Confirmed by LILO WOOTEN JORDAN ([id number]) on 2/20/2025 12:28:43 PM       Assessment:  Patient Active Problem List   Diagnosis    Hypercholesteremia    Vitamin D deficiency    GERD (gastroesophageal reflux disease)    Hashimoto's thyroiditis    Delirium, acute    Dehydration    Primary malignant neoplasm of lung metastatic to other site, unspecified laterality (HCC)    Intracranial hemorrhage (HCC)    Acute hemorrhagic infarction of brain (HCC)     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  She also has small other metastatic lesions in bilateral cerebral hemispheres.  Will defer further treatment to her oncologist and radiation oncologist.   He will be okay to resume her anticoagulation with Eliquis, there has been minimal to no hemorrhagic conversion of the metastatic disease in her      Neurology signing off.           Josselin  MD Román

## 2025-02-22 NOTE — PROGRESS NOTES
[facility]  part of [facility]    Neurosurgery Progress Note    Damari Hong Patient Status:  Inpatient    1950 MRN [id number]   Location [facility] 2W/SW Attending Vanessa Elam MD   Hosp Day # 2 PCP Shrtuhi Simon MD     Subjective:  Damari Hong is a(n) 74 year old female with primary lung cancer and brain metastases status post whole brain radiation.  No acute events overnight.  MRI of the brain reviewed without concerning findings.  Alert, Cooperative.  No apparent distress.    Vital Signs:    Temp:  [97 °F (36.1 °C)-98.4 °F (36.9 °C)] 97.2 °F (36.2 °C)  Pulse:  [] 116  Resp:  [17-29] 24  BP: (102-140)/(66-93) 140/80  SpO2:  [93 %-97 %] 95 %    I/O:  I/O last 3 completed shifts:  In: 3015.7 [I.V.:3015.7]  Out: 1225 [Urine:825; Drains:400]    Inpatient Medications:    Current Facility-Administered Medications:     potassium chloride 20 mEq/100mL IVPB premix 20 mEq, 20 mEq, Intravenous, Once    sodium chloride 0.9% infusion, , Intravenous, Continuous    acetaminophen (Tylenol) tab 650 mg, 650 mg, Oral, Q4H PRN **OR** acetaminophen (Tylenol) rectal suppository 650 mg, 650 mg, Rectal, Q4H PRN    labetalol (Trandate) 5 mg/mL injection 10 mg, 10 mg, Intravenous, Q10 Min PRN    hydrALAzine (Apresoline) 20 mg/mL injection 10 mg, 10 mg, Intravenous, Q2H PRN    famotidine (Pepcid) tab 20 mg, 20 mg, Oral, Daily **OR** famotidine (Pepcid) 20 mg/2mL injection 20 mg, 20 mg, Intravenous, Daily    ondansetron (Zofran) 4 MG/2ML injection 4 mg, 4 mg, Intravenous, Q6H PRN **OR** metoclopramide (Reglan) 5 mg/mL injection 10 mg, 10 mg, Intravenous, Q8H PRN    albuterol (Ventolin HFA) 108 (90 Base) MCG/ACT inhaler 1 puff, 1 puff, Inhalation, Q6H PRN    mirtazapine (Remeron) tab 7.5 mg, 7.5 mg, Oral, Nightly    predniSONE (Deltasone) tab 10 mg, 10 mg, Oral, Daily    levothyroxine (Synthroid) tab 50 mcg, 50 mcg, Oral, Daily    levETIRAcetam (Keppra) 500 mg/5mL injection 500 mg,  500 mg, Intravenous, Q12H    Labs:  Lab Results   Component Value Date    WBC 5.4 02/21/2025    HGB 10.4 (L) 02/21/2025    .0 (L) 02/21/2025    BUN 16 02/21/2025     02/21/2025    K 3.7 02/22/2025    CO2 23.0 02/21/2025    GLU 92 02/21/2025    ALB 4.8 03/23/2022    INR 1.0 09/21/2010    CRP <0.30 09/27/2021    ESRML 14 09/27/2021         Neurological Exam:  Alert and oriented to person and place  Follows some commands  EOMI, PERRL  Slight left facial droop with ptosis  Palate elevates symmetrically  No pronator drift    Left upper extremity does not move to noxious stimuli  Left hand  5 out of 5  Left lower extremity 4-5  Right upper extremity right lower extremity 5 out of 5 throughout    Abdomen:  Soft, non-distended, non-tender, with no rebound or guarding.  No peritoneal signs.   Extremities:  Non-tender, no lower extremity edema noted.        Imaging:  MRI BRAIN (W+WO) (CPT=70553)    Result Date: 2/21/2025  CONCLUSION:  1. Multiple supra tentorial brain metastases, right paramedian midbrain metastasis, and multiple small cerebellar metastases.  No large area of vasogenic edema/significant mass effect or midline shift.  No hydrocephalus.  No acute infarct.    Dictated by (CST): Nasir Jamil MD on 2/21/2025 at 6:17 PM     Finalized by (CST): Nasir Jamil MD on 2/21/2025 at 6:27 PM           Assessment/Plan:  Principal Problem:    Delirium, acute  Active Problems:    Dehydration    Primary malignant neoplasm of lung metastatic to other site, unspecified laterality (HCC)    Intracranial hemorrhage (HCC)    Acute hemorrhagic infarction of brain (HCC)      Damari Hnog is a(n) 74 year old female with past medical history of stage IV lung cancer, left hemiparesis and left-sided facial droop who had a syncopal event.  CT head with small possible hemorrhagic metastasis.  MRI of the brain pre and postcontrast appears without finding of hemorrhage or stroke.  No acute neurosurgical  intervention  Further workup per neurology for seizure activity  May reinitiate Eliquis 72 hours after hospital admission  Medical management per hospitalist team  Hematology/oncology to manage cancer    All questions and concerns were addressed. We appreciate the opportunity to participate in the care of this patient. Please do not hesitate to call our office ([phone]) with any issues.    Ean Regalado PA-C  2/22/2025  8:53 AM  Patient seen and examined with PA  Case discussed  MRI reviewed  No neurosurgical intervention needed  Recommend heme-onc to manage cancer  Neurology following  Please call with any questions or concerns

## 2025-02-22 NOTE — PAYOR COMM NOTE
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      History obtained mostly from son. Patient lives with son, was recently admitted at [facility] about 1 month ago with the flu and hasn't fully recovered since. Mostly bed/wheelchair bound since admission, able to transfer from bed to chair with stand and pivot. Normally has some issues with word finding and slow sleech. Son helped patient go to the bathroom at 2AM and got her back into bed then checked on her in the morning at 7AM and found her on the floor with some dried blood on her lips. Thinks she slid out of bed sometime in the night. He called EMS who was able to get her back to bed and do an assessment. Had normal vitals, no obvious fractures or significant bruising, offered to take her to the ER but she also had an oncologist appt that morning so they planned to go to that instead. While she was getting blood drawn from her port, she became unresponsive. Son felt like she was having a seizure however no seizure like activity noted but she was in and out of consciousness. EMS called and brought her here. Son notes that her speech is a little slower than usual and she is having more word finding difficulty than normal. Normally has some double vision. Has had generlized weakness since last admission but no unilateral weakness that family was aware of.      In the ED, BP Stable, tachycardic, O2 sat stable on home 2L NC. Labs without  significant abnormality. CT head concern for hemorrhagic metastasis. NSGY consulted.     /79  Pulse 117  Temp 99.3 °F (37.4 °C) (Oral)  Resp 25  Wt 162 lb 0.6 oz (73.5 kg)  SpO2 96%  BMI 28.70 kg/m     Lab 02/20/25  1136   RBC 3.55*   HGB 12.2   HCT 35.7   .6*   MCH 34.4*   MCHC 34.2   RDW 13.0   NEPRELIM 6.59   WBC 7.7   .0*      *   BUN 22   CREATSERUM 0.71   EGFRCR 89   CA 8.3*      K 3.7      CO2 19.0*     CT SPINE CERVICAL (CPT=72125)  Result Date: 2/20/2025  CONCLUSION:  1. No acute fracture or posttraumatic malalignment cervical spine.  2. Numerous osteoblastic presumed osseous metastases.  3. Multilevel degenerative changes of the cervical spine are apparent.      CT BRAIN OR HEAD (CPT=70450)  Result Date: 2/20/2025  CONCLUSION: Very small subtle area of decreased attenuation within the left frontoparietal lobe with small focal area of of peripheral increased density.  The findings could represent a hemorrhagic metastasis.  However this is an equivocal finding.  There is no mass effect or associated abnormality.  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     ?Hemorrhagic metastasis   L sided weakness  Syncope  - noted on CT head  - hold Eliquis  - MRI brain ordered in ER  - NSGY and neurology consulted  - d/w Dr. Tracy, will start keppra load  - neurochecks    Lung Ca with recurrent malignant pleural effusion  PE hx   Small R PTX - POA   Chronic hypoxic respiratory failure on home O2  - S/p R sided PleurX placement 1/31/25  - drain scheduled MWF 9:30AM, up to 1L  - due to drain tomorrow  - Already has 2L O2 at home - at baseline now     Lung cancer with brain mets  - follow-up oncology as  outpatient  - planned to start chemotherapy this week    - per family, had recent MRI brain a few weeks ago that showed decrease in size of brain mets  - has been on prednisone taper since November, currently on 10 mg daily    DVT/PE  - hold Eliquis     Hypothyroidism  - Continue synthroid     ACP  - CODE- DNR/full- confirmed with POA  - POA- son- updated at bedside   - lives at home with son     FN:  - IVF: none  - Diet: regular     DVT Prophy: SCD  Lines: PVI     Dispo: pending clinical course  Vanessa Elam MD   2/20/2025  5:17 PM     2/21/2025  Neurology   Reason for Consultation:   Possible seizure     History of Present Illness:   Patient is a 74 year old female who was admitted to the hospital for Delirium, acute:  74-year-old female with a history of lung cancer, malignant pleural effusion, PE, chronic respiratory failure, and on home oxygen presented with a possible syncopal episode and new-onset left-sided weakness. The patient was found on the floor at 7 a.m. after rolling off the bed.,  EMS was called but patient was not brought to the hospital since apparently she was back to her baseline.    Later in the while at an outpatient infusion center, she experienced a possible seizure-like episode where she stopped answering questions, hyperventilated, and slumped over. The patient reports experiencing double vision and left-sided weakness since yesterday. She was diagnosed with cancer in October of the previous year, with the most recent MRI in January showing some reduction in brain metastasis size, apparently patient has undergone whole brain radiation some immunotherapy but she had significant reaction to Keytruda. The patient also had pulmonary embolism in December and has been on Eliquis since then      02/21/25 0000 02/21/25 0200 02/21/25 0400 02/21/25 0600    BP: 117/73 126/81 121/69 130/89   Pulse: 107 106 99 105   Resp: 25 20 17 18   Temp: 97.9 °F (36.6 °C)   97.3 °F (36.3 °C)     TempSrc:  Temporal   Temporal     SpO2: 97% 95% 97% 94%     Neurological:   Mental Status- Alert and possible aphasia and dysarthria, able to answer some simple questions but had difficulty expressing herself in more details.    Component Value Date     WBC 5.4 02/21/2025     HGB 10.4 (L) 02/21/2025     HCT 30.2 (L) 02/21/2025     .0 (L) 02/21/2025     CREATSERUM 0.62 02/21/2025     BUN 16 02/21/2025      02/21/2025     K 3.8 02/21/2025      02/21/2025     CO2 23.0 02/21/2025     GLU 92 02/21/2025     CA 7.7 (L) 02/21/2025     Impression:       Possible Seizure:  - Patient experienced a seizure-like episode and new-onset left-sided weakness  - Found on the floor at 7 a.m. after rolling off the bed  - Episode of hyperventilation and slumping over at outpatient center  - Perform EEG to evaluate for seizure activity  - Continue Keppra for seizure prophylaxis  - Hold Eliquis due to possible hemorrhagic component of metastasis  - Differential diagnoses include postictal state, intracranial hemorrhage, or progression of brain metastases     Left-sided Weakness:  - New-onset left-sided weakness developed yesterday  - Difficulty with left arm and leg movement reported  - Patient reports double vision  - Conduct speech therapy evaluation for potential speech and swallowing issues     Lung Cancer with Brain Metastases:  - Diagnosed with lung cancer in October of the previous year  - Most recent MRI in January showed some reduction in tumor size but persistent disease  - Concern for brain metastases contributing to current neurological symptoms  - Perform brain MRI to assess current status of metastases and potential hemorrhage  Uche Tracy MD  2/21/2025    Neurosurgery Consultation   Reason for Consultation:  Hemorrhagic brain metastasis     History of Present Illness:  74-year-old female with a history of lung cancer, malignant pleural effusion, PE, chronic respiratory failure, and on home oxygen presented  with a possible syncopal episode and new-onset left-sided weakness. Patient was found down on the floor by EMS but was not brought to the hospital due to being back at her baseline. She then later in the day experienced an episode where she stopped answering questions and had a possible seizure. Patient reported some left sided weakness and difficulty with answering questions. Patient has previous diagnosis of lung cancer with metastasis to the brain and undergone whole brain radiation and some immunotherapy. She had a PE in December and has been on Eliquis.     Neurological Exam:  Alert and Oriented to person and place  Follows some commands  EOMI, PERRL  Slight left facial droop with ptosis    LUE does not move to noxious stimuli  Left hand  5/5  LLE 4/5  RUE and RLE 5/5 throughout   Sensation intact in BUE and BLE  Negative corral sign in BUE    Assessment/Plan:   sudden neurologic change yesterday with left hemiparesis and a left-sided facial droop.  She has a history of metastatic disease to the brain which was treated with radiation.  She was brought in through the emergency room yesterday and CT imaging suggests very small area of possible hemorrhagic metastases in the left frontoparietal area.  Anatomically, this is not related to his clinical presentation.  We have recommended an MRI of the brain pre and postcontrast.  Differential diagnosis includes postictal Korey's type paralysis and we we will defer to our neurology colleagues for evaluation of possible seizure activity.  We will follow.  Parker Berger MD  2/21/2025  7:44 AM    EEG report   IMPRESSION:   This is an abnormal EEG. Focal slowing, coupled with possible epileptiform discharges were seen in the left hemisphere. This constellation of findings is consistent with an abnormality in the left hemisphere that is potentially epileptogenic in nature.  Uche Tracy MD   2/21/2025  9:08 AM     MRI BRAIN (W+WO)   CONCLUSION:   1. Multiple  supra tentorial brain metastases, right paramedian midbrain metastasis, and multiple small cerebellar metastases.  No large area of vasogenic edema/significant mass effect or midline shift.  No hydrocephalus.  No acute infarct.     MEDICATIONS ADMINISTERED IN LAST 1 DAY:  famotidine (Pepcid) 20 mg/2mL injection 20 mg       Date Action Dose Route User    2/21/2025 0916 Given 20 mg Intravenous Rosa Isela Escalante RN          gadoterate meglumine (Dotarem) 7.5 MMOL/15ML injection 15 mL       Date Action Dose Route User    2/21/2025 1755 Given 11 mL Intravenous Abedin, Soofia          levETIRAcetam (Keppra) 500 mg/5mL injection 500 mg       Date Action Dose Route User    2/21/2025 0915 Given 500 mg Intravenous Rosa Isela Escalante RN          levETIRAcetam (Keppra) 500 mg/5mL injection 1,000 mg       Date Action Dose Route User    2/20/2025 2133 Given 1,000 mg Intravenous Todd Lucas RN          levothyroxine (Synthroid) tab 50 mcg       Date Action Dose Route User    2/21/2025 0941 Given 50 mcg Oral Rosa Isela Escalante RN          potassium chloride 20 mEq/100mL IVPB premix 20 mEq       Date Action Dose Route User    2/21/2025 0918 New Bag 20 mEq Intravenous Rosa Isela Escalante RN          potassium chloride (Klor-Con M20) tab 40 mEq       Date Action Dose Route User    2/21/2025 1213 Given 40 mEq Oral Rosa Isela Escalante RN          predniSONE (Deltasone) tab 10 mg       Date Action Dose Route User    2/21/2025 0941 Given 10 mg Oral Rosa Isela Escalante RN          sodium chloride 0.9% infusion       Date Action Dose Route User    2/20/2025 2133 New Bag (none) Intravenous Todd Lucas RN          sodium chloride 0.9% infusion       Date Action Dose Route User    2/21/2025 0700 Rate/Dose Change (none) Intravenous Rosa Isela Escalante RN            Vitals (last day)       Date/Time Temp Pulse Resp BP SpO2 Weight O2 Device O2 Flow Rate (L/min) Who    02/21/25 1800 -- 110 18 125/81 95 % -- None (Room air) -- AB    02/21/25 1700 -- 108 20  116/78 95 % -- None (Room air) -- AB    02/21/25 1600 98.4 °F (36.9 °C) 99 25 102/68 96 % -- None (Room air) -- AB    02/21/25 1500 -- 102 27 119/74 95 % -- Nasal cannula 2 L/min AB    02/21/25 1400 -- 101 19 119/74 96 % -- Nasal cannula 2 L/min AB    02/21/25 1300 -- 101 19 124/79 95 % -- Nasal cannula 2 L/min AB    02/21/25 1200 97 °F (36.1 °C) 100 28 107/67 96 % 126 lb 8.7 oz (57.4 kg) Nasal cannula 2 L/min AB    02/21/25 1101 -- 104 -- -- -- -- -- -- LV    02/21/25 1100 -- 103 28 117/77 95 % -- -- -- AB    02/21/25 1000 -- -- -- 117/70 95 % -- Nasal cannula 2 L/min AB    02/21/25 1000 -- 102 29 -- -- -- -- -- SF    02/21/25 0900 -- 101 23 118/70 95 % -- Nasal cannula 2 L/min AB    02/21/25 0800 97 °F (36.1 °C) 101 32 128/77 97 % -- Nasal cannula 2 L/min AB    02/21/25 0600 -- 105 18 130/89 94 % -- -- -- RJ    02/21/25 0400 97.3 °F (36.3 °C) 99 17 121/69 97 % -- Nasal cannula 2 L/min     02/21/25 0200 -- 106 20 126/81 95 % -- Nasal cannula 2 L/min RJ    02/21/25 0000 97.9 °F (36.6 °C) 107 25 117/73 97 % -- Nasal cannula 2 L/min     02/20/25 2200 -- 105 26 114/72 96 % -- Nasal cannula 2 L/min RJ    02/20/25 2100 -- 110 26 127/81 96 % -- Nasal cannula 2 L/min     02/20/25 2050 98.1 °F (36.7 °C) 110 28 119/81 96 % 128 lb 1.4 oz (58.1 kg) Nasal cannula 2 L/min RJ    02/20/25 2030 -- 109 25 117/78 96 % -- Nasal cannula 2 L/min CP    02/20/25 1800 -- 109 23 101/66 98 % -- -- -- CP    02/20/25 1745 -- 115 29 108/67 96 % -- -- -- TF    02/20/25 1611 -- 116 28 112/85 97 % -- -- -- CP    02/20/25 1530 -- 118 22 120/82 98 % -- -- -- CP    02/20/25 1200 -- 117 25 118/79 96 % -- Nasal cannula 2 L/min CP    02/20/25 1146 -- -- -- -- -- 162 lb 0.6 oz (73.5 kg) -- -- CP    02/20/25 1133 99.3 °F (37.4 °C) -- -- -- -- -- -- -- CP    02/20/25 1126 -- 131 28 128/86 92 % -- Nasal cannula 2 L/min CP       FOR REVIEW/APPROVAL OF INPT ADMISSION

## 2025-02-22 NOTE — PLAN OF CARE
Problem: Patient Centered Care  Goal: Patient preferences are identified and integrated in the patient's plan of care  Description: Interventions:  - What would you like us to know as we care for you? \"My family helps take care of me.\"  - Provide timely, complete, and accurate information to patient/family  - Incorporate patient and family knowledge, values, beliefs, and cultural backgrounds into the planning and delivery of care  - Encourage patient/family to participate in care and decision-making at the level they choose  - Honor patient and family perspectives and choices  Outcome: Progressing     Problem: Patient/Family Goals  Goal: Patient/Family Long Term Goal  Description: Patient's Long Term Goal: Feel better    Interventions:  - Educate on taking medications as prescribed  - Monitor vitals  -Up as tolerated  - See additional Care Plan goals for specific interventions  Outcome: Progressing  Goal: Patient/Family Short Term Goal  Description: Patient's Short Term Goal: No syncopal episodes    Interventions:   - EKG  - EEG  - Neurology consult  - Monitor vitals  - MRI  - See additional Care Plan goals for specific interventions  Outcome: Progressing     Problem: NEUROLOGICAL - ADULT  Goal: Achieves stable or improved neurological status  Description: INTERVENTIONS  - Assess for and report changes in neurological status  - Initiate measures to prevent increased intracranial pressure  - Maintain blood pressure and fluid volume within ordered parameters to optimize cerebral perfusion and minimize risk of hemorrhage  - Monitor temperature, glucose, and sodium. Initiate appropriate interventions as ordered  Outcome: Progressing  Goal: Absence of seizures  Description: INTERVENTIONS  - Monitor for seizure activity  - Administer anti-seizure medications as ordered  - Monitor neurological status  Outcome: Progressing  Goal: Remains free of injury related to seizure activity  Description: INTERVENTIONS:  - Maintain  airway, patient safety  and administer oxygen as ordered  - Monitor patient for seizure activity, document and report duration and description of seizure to MD/LIP  - If seizure occurs, turn patient to side and suction secretions as needed  - Reorient patient post seizure  - Seizure pads on all 4 side rails  - Instruct patient/family to notify RN of any seizure activity  - Instruct patient/family to call for assistance with activity based on assessment  Outcome: Progressing  Goal: Achieves maximal functionality and self care  Description: INTERVENTIONS  - Monitor swallowing and airway patency with patient fatigue and changes in neurological status  - Encourage and assist patient to increase activity and self care with guidance from PT/OT  - Encourage visually impaired, hearing impaired and aphasic patients to use assistive/communication devices  Outcome: Progressing     Problem: Impaired Cognition  Goal: Patient will exhibit improved attention, thought processing and/or memory  Description: Interventions:  - Minimize distractions in the room when full attention is required  Outcome: Progressing

## 2025-02-22 NOTE — SLP NOTE
SPEECH DAILY NOTE - INPATIENT    ASSESSMENT & PLAN   ASSESSMENT    Proper PPE worn. Hands sanitized upon entrance/exit Pt room.         Pt alert (fluctuating fatigue), afebrile and now on room air (previously on 2L/min 02 via NC during BSE). Pt seen for swallow analysis per BSE recommendations (after consulting with RN). Family present. Pt agreed to participate. Pt's preferred method of learning verbal. Pt upright in bed; observed with current diet of pureed/mildly-thick liquids via tsp for monitoring diet tolerance. Additionally, Pt seen with soft solid trials for candidacy of diet upgrade. Swallowing precautions/strategies discussed with Pt and family as SLP fed oral trials (family frequently feeds Pt meals). Prolonged mastication on soft solids. Lingual skills considered functional for bolus control of pureed; increased RAGHAV for soft solids. No significant oral retention. Pharyngeal response appeared to trigger within 1-2 per hyolaryngeal elevation to completion (functional rise/strength per palpation). No overt CSA on soft solid, pureed nor mildly-thick liquids via tsp. Delayed cough S/P mildly-thick liquids via tsp x1. Rec upgrade to BITE SIZE diet; continue mildly-thick liquids via tsp. Collaborated with RN regarding Pt's swallowing plan of care. Per RN, Pt with good tolerance of current diet. No report of difficulty taking meds.  Sp02 ~96% during this session. Call light within Pt's reach upon SLP discharge from room.      MRI Brain 2/21/25:  CONCLUSION:   1. Multiple supra tentorial brain metastases, right paramedian midbrain metastasis, and multiple small cerebellar metastases.  No large area of vasogenic edema/significant mass effect or midline shift.  No hydrocephalus.  No acute infarct.       No CXR completed.       PLAN:  To f/u x3-4 sessions to ensure safe intake of NEW UPGRADED diet (bite size food) and reinforce swallowing/aspiration precautions. To monitor for new CXR results. Diet to continue to be  upgraded as tolerated. VFSS IF appropriate. Family education to be continued as available.             Diet Recommendations - Solids: Mechanical soft chopped/ Soft & Bite Sized  Diet Recommendations - Liquids: Nectar thick liquids/ Mildly thick (via tsp)    Compensatory Strategies Recommended: Liquids via spoon  Aspiration Precautions: Upright position;Slow rate;Small bites;No straw  Medication Administration Recommendations: Whole in puree    Patient Experiencing Pain: No  Treatment Plan  Treatment Plan/Recommendations: Aspiration precautions  Interdisciplinary Communication: Discussed with RN  Plan posted at bedside    GOALS  Goal #1 The patient will tolerate PUREED consistency and MILDLY-THICK VIA TSP liquids without overt signs or symptoms of aspiration with 100 % accuracy over 1-2 session(s).    Diet upgraded to BITE SIZE food; continue mildly-thick via tsp 2/22/25.     In Progress   Goal #2 The patient will utilize compensatory strategies as outlined by  BSSE (clinical evaluation) including Slow rate, Small bites, No straws, CONTROLLED liquids, Upright 90 degrees with moderate PRN feeding assistance 90% of the time across 4-5 sessions.    SLP fed Pt; strategies executed. Posted in room.     In Progress   Goal #3 The patient will tolerate trial upgrade of BITE SIZE/SOFT ETC/SOLID consistency and THIN liquids without overt signs or symptoms of aspiration with 100 % accuracy over 3-4 session(s).     Refer to GOAL #1.     In Progress   FOLLOW UP  Follow Up Needed (Documentation Required): Yes  SLP Follow-up Date: 02/24/25  Duration: 2 weeks    Session: 1    If you have any questions, please contact   Rajni Ziegler M.S. [facility]/SLP  Speech-Language Pathologist  [facility]  #[id number]

## 2025-02-22 NOTE — PROGRESS NOTES
[facility] Hospitalist Progress Note     CC: Hospital Follow up    PCP: Shruthi Simon MD       Assessment/Plan:     Principal Problem:    Delirium, acute  Active Problems:    Dehydration    Primary malignant neoplasm of lung metastatic to other site, unspecified laterality (HCC)    Intracranial hemorrhage (HCC)    Acute hemorrhagic infarction of brain (HCC)      Ms. 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      ?Hemorrhagic metastasis   L sided weakness  Syncope  - noted on CT head  - hold Eliquis, ok to restart Eliquis today, d/w NSGY  - MRI brain without bleeding noted  - NSGY and neurology consulted  - continue IV keppra  - stop neurochecks  - PT/OT/SW    Lung Ca with recurrent malignant pleural effusion  PE hx   Small R PTX - POA   Chronic hypoxic respiratory failure on home O2  - S/p R sided PleurX placement 1/31/25  - drain scheduled MWF 9:30AM, up to 1L  - Already has 2L O2 at home - at baseline now     Lung cancer with brain mets  - follow-up oncology as outpatient  - planned to start chemotherapy this week    - per family, had recent MRI brain a few weeks ago that showed decrease in size of brain mets  - has been on prednisone taper since November, currently on 10 mg daily    DVT/PE  - restart Eliquis   - 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    Hypothyroidism  - Continue synthroid     ACP  - CODE- DNR/full- confirmed with POA  - POA- son- updated at bedside   - lives at home with son     FN:  - IVF: none  - Diet: regular     DVT Prophy: SCD  Lines: PVI     Dispo: pending clinical course, transfer to floor     Outpatient records or previous hospital records reviewed.      Further recommendations pending patient's clinical course.  [facility] hospitalist to continue to follow patient while in house      Patient and/or patient's family given opportunity to ask questions and note understanding and agreeing with therapeutic plan as outlined     Vanessa Elam MD  [facility] Hospitalist  Answering Service number: [phone]     Subjective:     No new changes, L sided weakness about the same. No bleeding noted on MRI.     OBJECTIVE:    Blood pressure 113/71, pulse 101, temperature 97.7 °F (36.5 °C), temperature source Temporal, resp. rate 24, weight 126 lb 8.7 oz (57.4 kg), SpO2 94%, not currently breastfeeding.    Temp:  [97 °F (36.1 °C)-98.4 °F (36.9 °C)] 97.7 °F (36.5 °C)  Pulse:  [] 101  Resp:  [17-28] 24  BP: (102-140)/(66-93) 113/71  SpO2:  [93 %-97 %] 94 %      Intake/Output:    Intake/Output Summary (Last 24 hours) at 2/22/2025 1425  Last data filed at 2/22/2025 1354  Gross per 24 hour   Intake 2087.5 ml   Output 950 ml   Net 1137.5 ml       Last 3 Weights   02/21/25 1200 126 lb 8.7 oz (57.4 kg)   02/20/25 2050 128 lb 1.4 oz (58.1 kg)   02/20/25 1146 162 lb 0.6 oz (73.5 kg)   03/23/22 0942 162 lb (73.5 kg)   01/12/22 1248 160 lb (72.6 kg)       Exam  GEN: female in NAD, slow slurred speech with some word finding difficulty, appears tired  HEENT: EOMI, PERRLA, L eyelid shut- baseline per family  Pulm: CTAB, no crackles or wheezes  CV: RRR, no murmurs  ABD: Soft, non-tender, non-distended, +BS  MSK:  LUE 2/5, L hand  3/5, LLE 3/5, RUE 5/5, RLE 4/5  Neuro: weakness as above, CN intact, sensory intact, L side neglect  Psych: Affect- normal  SKIN: warm, dry  EXT: no edema    Data Review:       Labs:     Recent Labs   Lab 02/20/25  1136 02/21/25  0346 02/22/25  1312   RBC 3.55* 2.96* 3.09*   HGB 12.2 10.4* 10.9*   HCT 35.7 30.2* 30.8*   .6* 102.0* 99.7   MCH 34.4* 35.1* 35.3*   MCHC 34.2 34.4 35.4   RDW 13.0 13.2 13.0   NEPRELIM 6.59  --   --    WBC 7.7 5.4 5.9   .0* 115.0* 124.0*         Recent Labs   Lab 02/20/25  1136 02/21/25  0346 02/22/25  0454 02/22/25  1312   * 92  --  124*   BUN  22 16  --  11   CREATSERUM 0.71 0.62  --  0.54*   EGFRCR 89 93  --  96   CA 8.3* 7.7*  --  7.8*    144  --  144   K 3.7 3.8 3.7 4.2    111  --  113*   CO2 19.0* 23.0  --  22.0       No results for input(s): \"ALT\", \"AST\", \"ALB\", \"AMYLASE\", \"LIPASE\", \"LDH\" in the last 168 hours.    Invalid input(s): \"ALPHOS\", \"TBIL\", \"DBIL\", \"TPROT\"      Imaging:  MRI BRAIN (W+WO) (CPT=70553)    Result Date: 2/21/2025  CONCLUSION:  1. Multiple supra tentorial brain metastases, right paramedian midbrain metastasis, and multiple small cerebellar metastases.  No large area of vasogenic edema/significant mass effect or midline shift.  No hydrocephalus.  No acute infarct.    Dictated by (CST): Nasir Jamil MD on 2/21/2025 at 6:17 PM     Finalized by (CST): Nasir Jamil MD on 2/21/2025 at 6:27 PM          CT SPINE CERVICAL (CPT=72125)    Result Date: 2/20/2025  CONCLUSION:  1. No acute fracture or posttraumatic malalignment cervical spine.  2. Numerous osteoblastic presumed osseous metastases.  3. Multilevel degenerative changes of the cervical spine are apparent.  4. Lesser incidental findings as above.    Dictated by (CST): Rayo Curiel MD on 2/20/2025 at 12:59 PM     Finalized by (CST): Rayo Curiel MD on 2/20/2025 at 1:02 PM          CT BRAIN OR HEAD (CPT=70450)    Result Date: 2/20/2025  CONCLUSION: Very small subtle area of decreased attenuation within the left frontoparietal lobe with small focal area of of peripheral increased density.  The findings could represent a hemorrhagic metastasis.  However this is an equivocal finding.  There is no mass effect or associated abnormality.  There are no similar appearing areas within the brain. The remainder of the exam is unremarkable.    Dictated by (CST): Aki Morrison MD on 2/20/2025 at 12:45 PM     Finalized by (CST): Aki Morrison MD on 2/20/2025 at 12:52 PM             Meds:     INPATIENT MEDICATIONS    Scheduled Medications:      famotidine, 20 mg, Daily    Or  famotidine, 20 mg, Daily  mirtazapine, 7.5 mg, Nightly  predniSONE, 10 mg, Daily  levothyroxine, 50 mcg, Daily  levETIRAcetam, 500 mg, Q12H            Drips:  sodium chloride, Last Rate: 75 mL/hr at 02/22/25 0044        PRN Medications  acetaminophen, 650 mg, Q4H PRN   Or  acetaminophen, 650 mg, Q4H PRN  labetalol, 10 mg, Q10 Min PRN  hydrALAzine, 10 mg, Q2H PRN  ondansetron, 4 mg, Q6H PRN   Or  metoclopramide, 10 mg, Q8H PRN  albuterol, 1 puff, Q6H PRN

## 2025-02-23 LAB
ANION GAP SERPL CALC-SCNC: 9 MMOL/L (ref 0–18)
BUN BLD-MCNC: 9 MG/DL (ref 9–23)
BUN/CREAT SERPL: 16.7 (ref 10–20)
CALCIUM BLD-MCNC: 7.4 MG/DL (ref 8.7–10.4)
CHLORIDE SERPL-SCNC: 113 MMOL/L (ref 98–112)
CO2 SERPL-SCNC: 22 MMOL/L (ref 21–32)
CREAT BLD-MCNC: 0.54 MG/DL
DEPRECATED RDW RBC AUTO: 46.7 FL (ref 35.1–46.3)
EGFRCR SERPLBLD CKD-EPI 2021: 96 ML/MIN/1.73M2 (ref 60–?)
ERYTHROCYTE [DISTWIDTH] IN BLOOD BY AUTOMATED COUNT: 13.1 % (ref 11–15)
GLUCOSE BLD-MCNC: 86 MG/DL (ref 70–99)
HCT VFR BLD AUTO: 28.3 %
HGB BLD-MCNC: 9.6 G/DL
MCH RBC QN AUTO: 33.8 PG (ref 26–34)
MCHC RBC AUTO-ENTMCNC: 33.9 G/DL (ref 31–37)
MCV RBC AUTO: 99.6 FL
OSMOLALITY SERPL CALC.SUM OF ELEC: 296 MOSM/KG (ref 275–295)
PLATELET # BLD AUTO: 121 10(3)UL (ref 150–450)
POTASSIUM SERPL-SCNC: 3.4 MMOL/L (ref 3.5–5.1)
POTASSIUM SERPL-SCNC: 3.4 MMOL/L (ref 3.5–5.1)
RBC # BLD AUTO: 2.84 X10(6)UL
SODIUM SERPL-SCNC: 144 MMOL/L (ref 136–145)
WBC # BLD AUTO: 4.2 X10(3) UL (ref 4–11)

## 2025-02-23 NOTE — PLAN OF CARE
Problem: Patient Centered Care  Goal: Patient preferences are identified and integrated in the patient's plan of care  Description: Interventions:  - What would you like us to know as we care for you? \"My family helps take care of me.\"  - Provide timely, complete, and accurate information to patient/family  - Incorporate patient and family knowledge, values, beliefs, and cultural backgrounds into the planning and delivery of care  - Encourage patient/family to participate in care and decision-making at the level they choose  - Honor patient and family perspectives and choices  Outcome: Progressing     Problem: Patient/Family Goals  Goal: Patient/Family Long Term Goal  Description: Patient's Long Term Goal: Feel better    Interventions:  - Educate on taking medications as prescribed  - Monitor vitals  -Up as tolerated  - See additional Care Plan goals for specific interventions  Outcome: Progressing  Goal: Patient/Family Short Term Goal  Description: Patient's Short Term Goal: No syncopal episodes    Interventions:   - EKG  - EEG  - Neurology consult  - Monitor vitals  - MRI  - See additional Care Plan goals for specific interventions  Outcome: Progressing     Problem: NEUROLOGICAL - ADULT  Goal: Achieves stable or improved neurological status  Description: INTERVENTIONS  - Assess for and report changes in neurological status  - Initiate measures to prevent increased intracranial pressure  - Maintain blood pressure and fluid volume within ordered parameters to optimize cerebral perfusion and minimize risk of hemorrhage  - Monitor temperature, glucose, and sodium. Initiate appropriate interventions as ordered  Outcome: Progressing  Goal: Absence of seizures  Description: INTERVENTIONS  - Monitor for seizure activity  - Administer anti-seizure medications as ordered  - Monitor neurological status  Outcome: Progressing  Goal: Remains free of injury related to seizure activity  Description: INTERVENTIONS:  - Maintain  airway, patient safety  and administer oxygen as ordered  - Monitor patient for seizure activity, document and report duration and description of seizure to MD/LIP  - If seizure occurs, turn patient to side and suction secretions as needed  - Reorient patient post seizure  - Seizure pads on all 4 side rails  - Instruct patient/family to notify RN of any seizure activity  - Instruct patient/family to call for assistance with activity based on assessment  Outcome: Progressing  Goal: Achieves maximal functionality and self care  Description: INTERVENTIONS  - Monitor swallowing and airway patency with patient fatigue and changes in neurological status  - Encourage and assist patient to increase activity and self care with guidance from PT/OT  - Encourage visually impaired, hearing impaired and aphasic patients to use assistive/communication devices  Outcome: Progressing     Problem: Impaired Cognition  Goal: Patient will exhibit improved attention, thought processing and/or memory  Description: Interventions:    Outcome: Progressing     Problem: PAIN - ADULT  Goal: Verbalizes/displays adequate comfort level or patient's stated pain goal  Description: INTERVENTIONS:  - Encourage pt to monitor pain and request assistance  - Assess pain using appropriate pain scale  - Administer analgesics based on type and severity of pain and evaluate response  - Implement non-pharmacological measures as appropriate and evaluate response  - Consider cultural and social influences on pain and pain management  - Manage/alleviate anxiety  - Utilize distraction and/or relaxation techniques  - Monitor for opioid side effects  - Notify MD/LIP if interventions unsuccessful or patient reports new pain  - Anticipate increased pain with activity and pre-medicate as appropriate  Outcome: Progressing     Problem: SAFETY ADULT - FALL  Goal: Free from fall injury  Description: INTERVENTIONS:  - Assess pt frequently for physical needs  - Identify  cognitive and physical deficits and behaviors that affect risk of falls.  - [place] fall precautions as indicated by assessment.  - Educate pt/family on patient safety including physical limitations  - Instruct pt to call for assistance with activity based on assessment  - Modify environment to reduce risk of injury  - Provide assistive devices as appropriate  - Consider OT/PT consult to assist with strengthening/mobility  - Encourage toileting schedule  Outcome: Progressing     Problem: DISCHARGE PLANNING  Goal: Discharge to home or other facility with appropriate resources  Description: INTERVENTIONS:  - Identify barriers to discharge w/pt and caregiver  - Include patient/family/discharge partner in discharge planning  - Arrange for needed discharge resources and transportation as appropriate  - Identify discharge learning needs (meds, wound care, etc)  - Arrange for interpreters to assist at discharge as needed  - Consider post-discharge preferences of patient/family/discharge partner  - Complete POLST form as appropriate  - Assess patient's ability to be responsible for managing their own health  - Refer to Case Management Department for coordinating discharge planning if the patient needs post-hospital services based on physician/LIP order or complex needs related to functional status, cognitive ability or social support system  Outcome: Progressing   A&O1. No acute events. Vital signs stable. Denies pain or nausea. IV fluids infusing. Patient is maximum assist. Voids through purewick. No BM overnight.  Patient has low appetite. Takes her pills crushed with apple sauce. Pleurex drain clamped. Fall and safety precaution sin place.

## 2025-02-23 NOTE — CM/SW NOTE
02/23/25 1000   CM/SW Referral Data   Referral Source Physician   Reason for Referral Discharge planning;Advance Directives/POLST   Medical Hx   Does patient have an established PCP? Yes  (Carlo Wakefield)   Significant Past Medical/Mental Health Hx Lung cancer   Patient Info   Advanced directives? No   Patient's Current Mental Status at Time of Assessment Alert;Oriented   Patient's Home Environment House   Number of Levels in Home 2  (Able to live on main level)   Number of Stair in Home 2 RICA   Patient lives with Alone   Patient Status Prior to Admission   Independent with ADLs and Mobility No   Pt. requires assistance with Housework;Driving;Meals;Bathing;Ambulating;Dressing;Medications   Services in place prior to admission Home Health Care;DME/Supplies at home   Home Health Provider Info [facility]   DME Provider/Supplier [facility]   Type of DME/Supplies Wheeled Walker;Wheelchair;Home Oxygen   Discharge Needs   Anticipated D/C needs Home health care     SW received MD order for home health evaluation/POLST.    SW to defer POLST conversation to MD.    {T admitted for syncopal episode at outpatient appointment.  Pt has dx of metastatic lung cancer.    JOYCE met with pt and her daughter Cat welch to complete assessment.    Pt seen asleep in bed- AO3 per RN charting.    Earl confirmed address and PCP is : Carlo Wakefield MD.  Pt's oncologist is Dr. Nicolás Santacruz.    Pt is from home alone but has 6 children who are on a schedule to care for her. At baseline, pt was using a walker to ambulate but has become weaker since having flu A and was using her wheelchair.    Pt requires assistance with all ADLs.    Joyce confirmed pt is current with [facility] for RN/PT/OT.  Face to face/TRINY entered.    Pt has 2 L o2 PRN through [facility].    Anticipated therapy need: Gradual Rehabilitative Therapy.    At this time, plan is to begin oral chemo this upcoming week.   Pt/family prefer to prioritize chemo tx and return home.    PLAN: DC home w/[facility] pending med clear    / to remain available for support and/or discharge planning.     Annie MORALEZ MSW, LSW  Discharge Planner

## 2025-02-23 NOTE — PLAN OF CARE
Pt is alert and oriented x2-3. Lethargic. Intermittent confusion noted. Max assist, bed bound. Purewick in place. Port accessed. On bite-sized nectar thick liquid diet. Low appetite. 1 to 1 feed. Pills crushed in apple sauce. On 0.9% saline at 75ml/hr. Denies pain and nausea. Rails padded for seizure precautions. Safety measures in place. Will continue to monitor.      Problem: Patient Centered Care  Goal: Patient preferences are identified and integrated in the patient's plan of care  Description: Interventions:  - What would you like us to know as we care for you? \"My family helps take care of me.\"  - Provide timely, complete, and accurate information to patient/family  - Incorporate patient and family knowledge, values, beliefs, and cultural backgrounds into the planning and delivery of care  - Encourage patient/family to participate in care and decision-making at the level they choose  - Honor patient and family perspectives and choices  Outcome: Progressing     Problem: Patient/Family Goals  Goal: Patient/Family Long Term Goal  Description: Patient's Long Term Goal: Feel better    Interventions:  - Educate on taking medications as prescribed  - Monitor vitals  -Up as tolerated  - See additional Care Plan goals for specific interventions  Outcome: Progressing  Goal: Patient/Family Short Term Goal  Description: Patient's Short Term Goal: No syncopal episodes    Interventions:   - EKG  - EEG  - Neurology consult  - Monitor vitals  - MRI  - See additional Care Plan goals for specific interventions  Outcome: Progressing     Problem: NEUROLOGICAL - ADULT  Goal: Achieves stable or improved neurological status  Description: INTERVENTIONS  - Assess for and report changes in neurological status  - Initiate measures to prevent increased intracranial pressure  - Maintain blood pressure and fluid volume within ordered parameters to optimize cerebral perfusion and minimize risk of hemorrhage  - Monitor temperature, glucose,  and sodium. Initiate appropriate interventions as ordered  Outcome: Progressing  Goal: Absence of seizures  Description: INTERVENTIONS  - Monitor for seizure activity  - Administer anti-seizure medications as ordered  - Monitor neurological status  Outcome: Progressing  Goal: Remains free of injury related to seizure activity  Description: INTERVENTIONS:  - Maintain airway, patient safety  and administer oxygen as ordered  - Monitor patient for seizure activity, document and report duration and description of seizure to MD/LIP  - If seizure occurs, turn patient to side and suction secretions as needed  - Reorient patient post seizure  - Seizure pads on all 4 side rails  - Instruct patient/family to notify RN of any seizure activity  - Instruct patient/family to call for assistance with activity based on assessment  Outcome: Progressing  Goal: Achieves maximal functionality and self care  Description: INTERVENTIONS  - Monitor swallowing and airway patency with patient fatigue and changes in neurological status  - Encourage and assist patient to increase activity and self care with guidance from PT/OT  - Encourage visually impaired, hearing impaired and aphasic patients to use assistive/communication devices  Outcome: Progressing     Problem: PAIN - ADULT  Goal: Verbalizes/displays adequate comfort level or patient's stated pain goal  Description: INTERVENTIONS:  - Encourage pt to monitor pain and request assistance  - Assess pain using appropriate pain scale  - Administer analgesics based on type and severity of pain and evaluate response  - Implement non-pharmacological measures as appropriate and evaluate response  - Consider cultural and social influences on pain and pain management  - Manage/alleviate anxiety  - Utilize distraction and/or relaxation techniques  - Monitor for opioid side effects  - Notify MD/LIP if interventions unsuccessful or patient reports new pain  - Anticipate increased pain with activity  and pre-medicate as appropriate  Outcome: Progressing     Problem: SAFETY ADULT - FALL  Goal: Free from fall injury  Description: INTERVENTIONS:  - Assess pt frequently for physical needs  - Identify cognitive and physical deficits and behaviors that affect risk of falls.  - [place] fall precautions as indicated by assessment.  - Educate pt/family on patient safety including physical limitations  - Instruct pt to call for assistance with activity based on assessment  - Modify environment to reduce risk of injury  - Provide assistive devices as appropriate  - Consider OT/PT consult to assist with strengthening/mobility  - Encourage toileting schedule  Outcome: Progressing     Problem: DISCHARGE PLANNING  Goal: Discharge to home or other facility with appropriate resources  Description: INTERVENTIONS:  - Identify barriers to discharge w/pt and caregiver  - Include patient/family/discharge partner in discharge planning  - Arrange for needed discharge resources and transportation as appropriate  - Identify discharge learning needs (meds, wound care, etc)  - Arrange for interpreters to assist at discharge as needed  - Consider post-discharge preferences of patient/family/discharge partner  - Complete POLST form as appropriate  - Assess patient's ability to be responsible for managing their own health  - Refer to Case Management Department for coordinating discharge planning if the patient needs post-hospital services based on physician/LIP order or complex needs related to functional status, cognitive ability or social support system  Outcome: Progressing

## 2025-02-24 LAB
ANION GAP SERPL CALC-SCNC: 9 MMOL/L (ref 0–18)
BUN BLD-MCNC: 10 MG/DL (ref 9–23)
BUN/CREAT SERPL: 18.2 (ref 10–20)
CALCIUM BLD-MCNC: 7.8 MG/DL (ref 8.7–10.4)
CHLORIDE SERPL-SCNC: 111 MMOL/L (ref 98–112)
CO2 SERPL-SCNC: 22 MMOL/L (ref 21–32)
CREAT BLD-MCNC: 0.55 MG/DL
DEPRECATED RDW RBC AUTO: 48.2 FL (ref 35.1–46.3)
EGFRCR SERPLBLD CKD-EPI 2021: 96 ML/MIN/1.73M2 (ref 60–?)
ERYTHROCYTE [DISTWIDTH] IN BLOOD BY AUTOMATED COUNT: 13.2 % (ref 11–15)
GLUCOSE BLD-MCNC: 93 MG/DL (ref 70–99)
HCT VFR BLD AUTO: 30.3 %
HGB BLD-MCNC: 10.3 G/DL
MCH RBC QN AUTO: 34.3 PG (ref 26–34)
MCHC RBC AUTO-ENTMCNC: 34 G/DL (ref 31–37)
MCV RBC AUTO: 101 FL
OSMOLALITY SERPL CALC.SUM OF ELEC: 293 MOSM/KG (ref 275–295)
PLATELET # BLD AUTO: 127 10(3)UL (ref 150–450)
POTASSIUM SERPL-SCNC: 3.6 MMOL/L (ref 3.5–5.1)
POTASSIUM SERPL-SCNC: 3.6 MMOL/L (ref 3.5–5.1)
RBC # BLD AUTO: 3 X10(6)UL
SODIUM SERPL-SCNC: 142 MMOL/L (ref 136–145)
WBC # BLD AUTO: 4.5 X10(3) UL (ref 4–11)

## 2025-02-24 NOTE — PAYOR COMM NOTE
--------------  CONTINUED STAY REVIEW    Payor: [employer] MEDICARE ADV PPO  Subscriber #:  [id number]  Authorization Number: [id number]    Admit date: 2/20/25  Admit time:  8:41 PM    REVIEW DOCUMENTATION:  2/22/2025  Neurosurgery Progress Note   Temp:  [97 °F (36.1 °C)-98.4 °F (36.9 °C)] 97.2 °F (36.2 °C)  Pulse:  [] 116  Resp:  [17-29] 24  BP: (102-140)/(66-93) 140/80  SpO2:  [93 %-97 %] 95 %    I/O last 3 completed shifts:  In: 3015.7 [I.V.:3015.7]  Out: 1225 [Urine:825; Drains:400]    Component Value Date     K 3.7 02/22/2025     Neurological Exam:  Alert and oriented to person and place  Follows some commands  EOMI, PERRL  Slight left facial droop with ptosis  Palate elevates symmetrically  No pronator drift     Left upper extremity does not move to noxious stimuli  Left hand  5 out of 5  Left lower extremity 4-5  Right upper extremity right lower extremity 5 out of 5 throughout    MRI BRAIN (W+WO) (CPT=70553)  Result Date: 2/21/2025  CONCLUSION:         1. Multiple supra tentorial brain metastases, right paramedian midbrain metastasis, and multiple small cerebellar metastases.  No large area of vasogenic edema/significant mass effect or midline shift.  No hydrocephalus.  No acute infarct.     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  She also has small other metastatic lesions in bilateral cerebral hemispheres.  Will defer further treatment to her oncologist and  radiation oncologist.   He will be okay to resume her anticoagulation with Eliquis, there has been minimal to no hemorrhagic conversion of the metastatic disease in her     Neurology signing off.   Josselin France MD  2/22/2025 11:17 AM     2/23/20025  Hospitalist Progress Note   Temp:  [97.7 °F (36.5 °C)-98.2 °F (36.8 °C)] 98.2 °F (36.8 °C)  Pulse:  [104] 104  Resp:  [16-19] 16  BP: (119-126)/(80-87) 119/80  SpO2:  [94 %-95 %] 95 %    2/23/2025 0930      Gross per 24 hour   Intake 1063 ml   Output 250 ml   Net 813 ml       Lab 02/20/25  1136 02/21/25  0346 02/22/25  1312 02/23/25  0503   RBC 3.55* 2.96* 3.09* 2.84*   HGB 12.2 10.4* 10.9* 9.6*   HCT 35.7 30.2* 30.8* 28.3*   .6* 102.0* 99.7 99.6   MCH 34.4* 35.1* 35.3* 33.8   MCHC 34.2 34.4 35.4 33.9   RDW 13.0 13.2 13.0 13.1   NEPRELIM 6.59  --   --   --    WBC 7.7 5.4 5.9 4.2   .0* 115.0* 124.0* 121.0*     GLU 92  --  124* 86   BUN 16  --  11 9   CREATSERUM 0.62  --  0.54* 0.54*   EGFRCR 93  --  96 96   CA 7.7*  --  7.8* 7.4*     --  144 144   K 3.8 3.7 4.2 3.4*  3.4*     --  113* 113*   CO2 23.0  --  22.0 22.0      Assessment/Plan:   ?Possible Hemorrhagic metastasis - ruled out  L sided weakness  Syncope  - noted on CT head  - hold Eliquis, ok to restart Eliquis today, d/w NSGY  - MRI brain without bleeding noted  - NSGY and neurology consulted  - continue IV keppra  - stop neurochecks  - PT/OT/SW  - per neurology, weakness thought to be due to brain stem metastasis    Lung Ca with recurrent malignant pleural effusion  PE hx   Small R PTX - POA   Chronic hypoxic respiratory failure on home O2  - S/p R sided PleurX placement 1/31/25  - drain scheduled MWF 9:30AM, up to 1L  - Already has 2L O2 at home - at baseline now     Lung cancer with brain mets  - follow-up oncology as outpatient  - planned to start chemotherapy this week    - per family, had recent MRI brain a few weeks ago that showed decrease in size of brain mets  - has been on  prednisone taper since November, currently on 10 mg daily    DVT/PE  - restart Eliquis   - 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    Hypothyroidism  - Continue synthroid     ACP  - CODE- DNR/full- confirmed with POA  - POA- son- updated at bedside   - lives at home with son     FN:  - IVF: none  - Diet: regular     DVT Prophy: SCD  Lines: PVI     Dispo: pending clinical course, possible home tomorrow   Vanessa Elam MD   2/23/2025  8:02 PM      MEDICATIONS ADMINISTERED IN LAST 1 DAY:  apixaban (Eliquis) tab 5 mg       Date Action Dose Route User    2/23/2025 2156 Given 5 mg Oral Ailyn Nathan RN    2/23/2025 1008 Given 5 mg Oral Jenna Clemente RN          famotidine (Pepcid) tab 20 mg       Date Action Dose Route User    2/23/2025 1008 Given 20 mg Oral Jenna Clemente RN          levETIRAcetam (Keppra) 500 mg/5mL injection 500 mg       Date Action Dose Route User    2/23/2025 2156 Given 500 mg Intravenous Ailyn Nathan RN    2/23/2025 1008 Given 500 mg Intravenous Jenna Clemente RN          levothyroxine (Synthroid) tab 50 mcg       Date Action Dose Route User    2/23/2025 1008 Given 50 mcg Oral Jenna Clemente RN          mirtazapine (Remeron) tab 7.5 mg       Date Action Dose Route User    2/23/2025 2156 Given 7.5 mg Oral Ailyn Nathan RN          potassium chloride 40 mEq in 250mL sodium chloride 0.9% IVPB premix       Date Action Dose Route User    2/23/2025 1130 New Bag 40 mEq Intravenous Jenna Clemente RN          potassium chloride 40 mEq in 250mL sodium chloride 0.9% IVPB premix       Date Action Dose Route User    2/24/2025 0834 New Bag 40 mEq Intravenous Jenna Clemente RN          predniSONE (Deltasone) tab 10 mg       Date Action Dose Route User    2/23/2025 0930 Given 10 mg Oral Jenna Clemente RN            Vitals (last day)       Date/Time Temp Pulse Resp BP SpO2 Weight O2 Device O2 Flow Rate (L/min) [facility]    02/24/25 0405 97.9 °F (36.6 °C)  96 16 122/79 95 % -- None (Room air) -- MF    02/24/25 0223 -- 137 -- -- -- -- -- -- LT    02/23/25 2153 98.1 °F (36.7 °C) 105 16 123/75 94 % -- None (Room air) -- MF    02/23/25 1900 -- 108 -- -- -- -- -- -- CY    02/23/25 1128 98.2 °F (36.8 °C) 104 16 119/80 95 % -- None (Room air) -- KW    02/23/25 0456 98.2 °F (36.8 °C) -- 18 120/87 94 % -- None (Room air) -- VT     02/22/25 2037 97.7 °F (36.5 °C) -- 19 126/83 95 % -- None (Room air) -- VT   02/22/25 1900 -- 97 -- -- -- -- -- -- CY   02/22/25 1607 98 °F (36.7 °C) 99 -- 117/71 95 % -- None (Room air) -- NS   02/22/25 1200 97.7 °F (36.5 °C) 101 24 113/71 94 % -- None (Room air) -- AN   02/22/25 1000 -- 111 28 Abnormal  113/84 96 % -- None (Room air) -- AN   02/22/25 0800 97 °F (36.1 °C) 101 23 111/80 94 % -- None (Room air) -- AN   02/22/25 0600 -- 116 24 140/80 95 % -- None (Room air) -- CC   02/22/25 0500 -- 99 24 103/66 95 % -- None (Room air) -- CC   02/22/25 0400 97.2 °F (36.2 °C) 98 28 Abnormal  105/75 94 % -- None (Room air) -- CC   02/22/25 0300 -- 98 20 105/68 94 % -- None (Room air) -- CC   02/22/25 0200 -- 106 22 135/85 94 % -- None (Room air) -- CC   02/22/25 0100 -- 98 26 123/77 93 % -- None (Room air) -- CC   02/22/25 0000 97 °F (36.1 °C) 108 24 122/82 93 % -- None (Room air) -- CC       FOR CONTINUED STAY REVIEW/APPROVAL OF INPT ADMISSION

## 2025-02-24 NOTE — CM/SW NOTE
Cm was notified by MD that family has concerns about being able to meet pts needs at home. MD requested PT OT follow up eval and eval family ability to meet needs.    Per OT - rec is continuous assistance and gradual rehabilitative therapy  at discharge to promote prior level of function.  Anticipate patient will return home with home health OT.   If home, pt's family will req extensive education on hospital bed, anne lift, skin inspection and pressure sore prevention.      PT note pending at this time    1440  CM was notified by PT- rec is GR for this pt. She was more fatigued than she was on her eval last week, and family is on board now that an MD said she should be able to get oral chemo at .     CM informed PT and RYAN Dunham that chemo cost will need to be confirmed by [facility]. Pt may need to supply her own if cost is too high.    CM will need to send drug/dose/freq info to [facility]. Requested info as pt is not on any chemo med at this time per MAR    Tent [facility] ref sent, PASRR in [employer] is current from 12/2024    1545  CM met w/ pt and son at bedside, provided list, explained family will need to provide oral chemo.    Son will discuss with his siblings and call CM with choice. Son is aware choice is needed by tomorrow to allow for ins auth.    Plan  [facility] Pending choice and auth    / to remain available for support and/or discharge planning.     Parris Vergara, RN    Ext [phone]

## 2025-02-24 NOTE — OCCUPATIONAL THERAPY NOTE
OCCUPATIONAL THERAPY TREATMENT NOTE - INPATIENT        Room Number: 462/462-A     Presenting Problem: acute delirium    Problem List  Principal Problem:    Delirium, acute  Active Problems:    Dehydration    Primary malignant neoplasm of lung metastatic to other site, unspecified laterality (HCC)    Intracranial hemorrhage (HCC)    Acute hemorrhagic infarction of brain (HCC)      OCCUPATIONAL THERAPY ASSESSMENT   Patient demonstrates limited progress this session, goals remain in progress.    Patient is requiring maximum assist as a result of the following impairments: decreased functional strength, decreased functional reach, decreased endurance, impaired coordination, impaired motor planning, decreased muscular endurance, medical status, decreased insight to deficits, decreased safety awareness, and visual deficits.    Patient continues to function below baseline with  all B-ADL tasks and functional transfers .  Next session anticipate patient to progress bed mobility, transfers, static sitting balance, maintaining seated position, and energy conservation strategies.  Occupational Therapy will continue to follow patient for duration of hospitalization.    Patient continues to benefit from continued skilled OT services: to promote return to prior level of function and safety with continuous assistance and gradual rehabilitative therapy and at discharge to promote prior level of function.  Anticipate patient will return home with home health OT.   If home, pt's family will req extensive education on hospital bed, anne lift, skin inspection and pressure sore prevention.      PLAN DURING HOSPITALIZATION     OT Treatment Plan: Balance activities;Energy conservation/work simplification techniques;ADL training;Visual perceptual training;Functional transfer training;UE strengthening/ROM;Endurance training;Patient/Family education;Patient/Family training;Equipment eval/education     SUBJECTIVE  \"I like  music\"    OBJECTIVE  Precautions: Limb alert - left;Seizure;Bed/chair alarm    WEIGHT BEARING RESTRICTION     PAIN ASSESSMENT  Rating: -- (intermittent c/o not rated)  Location: LT UE/IV site (son reports pt is having pain d/t K+ infusion)  Management Techniques: Repositioning    ACTIVITY TOLERANCE                     O2 SATURATIONS       ACTIVITIES OF DAILY LIVING ASSESSMENT  AM-PAC ‘6-Clicks’ Inpatient Daily Activity Short Form  How much help from another person does the patient currently need…  -   Putting on and taking off regular lower body clothing?: A Lot  -   Bathing (including washing, rinsing, drying)?: A Lot  -   Toileting, which includes using toilet, bedpan or urinal? : Total  -   Putting on and taking off regular upper body clothing?: A Lot  -   Taking care of personal grooming such as brushing teeth?: A Lot  -   Eating meals?: A Lot    AM-PAC Score:  Score: 11  Approx Degree of Impairment: 70.42%  Standardized Score (AM-PAC Scale): 29.04  CMS Modifier (G-Code): CL    FUNCTIONAL TRANSFER ASSESSMENT  Sit to Stand: -- (deferred at this time)    BED MOBILITY  Rolling: Maximum Assist  Supine to Sit : Maximum Assist  Sit to Supine (OT): Maximum Assist  Scooting: -- (Max A)    BALANCE ASSESSMENT  Static Sitting: Maximum Assist  Static Standing: Not Tested    FUNCTIONAL ADL ASSESSMENT     THERAPEUTIC EXERCISE       Skilled Therapy Provided:  Role of OT, t/f to eob, static sitting balance at EOB in prep for participation in self care, delirium prevention with son (unable to accurately assess for delirium d/t pt unable to answer questions for CAM) and importance of stimulation to left side d/t right head gaze, left sindy and signs and symptoms of left neglect.  Of note, pt intermittently closing left eye and verbally endorsed diplopia.  Family verbalized desire to receive chemo for patient.  OT messaged RN and SW to hand off information learned from son.      EDUCATION PROVIDED  Patient Education : Role of  Occupational Therapy; Plan of Care; Discharge Recommendations; Functional Transfer Techniques  Patient's Response to Education: Does Not Demonstrate Skills Needed for Learning  Family/Caregiver Education : Role of Occupational Therapy; Plan of Care; Discharge Recommendations; DME Recommendations; Functional Transfer Techniques; Fall Prevention; Energy Conservation  Family/Caregiver's Response to Education: Verbalized Understanding; Requires Further Education    The patient's Approx Degree of Impairment: 70.42% has been calculated based on documentation in the [facility] '6 clicks' Inpatient Daily Activity Short Form.  Research supports that patients with this level of impairment may benefit from home w/ 24/7 supervision hospital bed and anne lift vs. [facility].  Final disposition will be made by interdisciplinary medical team.    Patient End of Session: In bed;Needs met;Call light within reach;RN aware of session/findings;All patient questions and concerns addressed;Hospital anti-slip socks;SCDs in place;Alarm set;Family present    OT Goals:  Patients self stated goal is: not indicated      Patient will complete functional transfer with min A  Comment:     Patient will complete UE dressing with Min A  Comment:     Patient will tolerate standing for 2 minutes in prep for adls with Min A   Comment:    Patient will complete LE dressing with Min A  Comment:             OT Session Time: 45 minutes  Self-Care Home Management: 45 minutes

## 2025-02-24 NOTE — PROGRESS NOTES
[facility] Hospitalist Progress Note     CC: Hospital Follow up    PCP: Shruthi Simon MD       Assessment/Plan:     Principal Problem:    Delirium, acute  Active Problems:    Dehydration    Primary malignant neoplasm of lung metastatic to other site, unspecified laterality (HCC)    Intracranial hemorrhage (HCC)    Acute hemorrhagic infarction of brain (HCC)      Ms. 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      ?Possible Hemorrhagic metastasis - ruled out  L sided weakness  Syncope  - noted on CT head  - hold Eliquis, ok to restart Eliquis today, d/w NSGY  - MRI brain without bleeding noted  - NSGY and neurology consulted  - continue IV keppra  - stop neurochecks  - PT/OT/SW  - per neurology, weakness thought to be due to brain stem metastasis    Lung Ca with recurrent malignant pleural effusion  PE hx   Small R PTX - POA   Chronic hypoxic respiratory failure on home O2  - S/p R sided PleurX placement 1/31/25  - drain scheduled MWF 9:30AM, up to 1L  - Already has 2L O2 at home - at baseline now     Lung cancer with brain mets  - follow-up oncology as outpatient  - planned to start chemotherapy this week    - per family, had recent MRI brain a few weeks ago that showed decrease in size of brain mets  - has been on prednisone taper since November, currently on 10 mg daily    DVT/PE  - restart Eliquis   - 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    Hypothyroidism  - Continue synthroid     ACP  - CODE- DNR/full- confirmed with POA  - POA- son- updated at bedside   - lives at home with son     FN:  - IVF: none  - Diet: regular     DVT Prophy: SCD  Lines: PVI     Dispo: pending clinical course, possible home tomorrow     Outpatient records or previous hospital records reviewed.      Further recommendations pending  patient's clinical course.  [facility] hospitalist to continue to follow patient while in house     Patient and/or patient's family given opportunity to ask questions and note understanding and agreeing with therapeutic plan as outlined     Vanessa Elam MD  [facility] Hospitalist  Answering Service number: [phone]     Subjective:     LUE weakness improved today, still fatigues easily. Had issues with swallowing pills earlier but able to take them crushed in orange juice. Daughter at bedside.     OBJECTIVE:    Blood pressure 119/80, pulse 104, temperature 98.2 °F (36.8 °C), temperature source Oral, resp. rate 16, height 5' 3\" (1.6 m), weight 126 lb 8.7 oz (57.4 kg), SpO2 95%, not currently breastfeeding.    Temp:  [97.7 °F (36.5 °C)-98.2 °F (36.8 °C)] 98.2 °F (36.8 °C)  Pulse:  [104] 104  Resp:  [16-19] 16  BP: (119-126)/(80-87) 119/80  SpO2:  [94 %-95 %] 95 %      Intake/Output:    Intake/Output Summary (Last 24 hours) at 2/23/2025 2000  Last data filed at 2/23/2025 0930  Gross per 24 hour   Intake 1063 ml   Output 250 ml   Net 813 ml       Last 3 Weights   02/21/25 1200 126 lb 8.7 oz (57.4 kg)   02/20/25 2050 128 lb 1.4 oz (58.1 kg)   02/20/25 1146 162 lb 0.6 oz (73.5 kg)   03/23/22 0942 162 lb (73.5 kg)   01/12/22 1248 160 lb (72.6 kg)       Exam  GEN: female in NAD, slow slurred speech with some word finding difficulty, appears tired  HEENT: EOMI, PERRLA, L eyelid shut- baseline per family  Pulm: CTAB, no crackles or wheezes  CV: RRR, no murmurs  ABD: Soft, non-tender, non-distended, +BS  MSK:  LUE 3/5, L hand  4/5, LLE 3/5, RUE 5/5, RLE 4/5  Neuro: weakness as above, CN intact, sensory intact, L side neglect  Psych: Affect- normal  SKIN: warm, dry  EXT: no edema    Data Review:       Labs:     Recent Labs   Lab 02/20/25  1136 02/21/25  0346 02/22/25  1312 02/23/25  0503   RBC 3.55* 2.96* 3.09* 2.84*   HGB 12.2 10.4* 10.9* 9.6*   HCT 35.7 30.2* 30.8* 28.3*   .6* 102.0* 99.7 99.6   MCH 34.4* 35.1* 35.3*  33.8   MCHC 34.2 34.4 35.4 33.9   RDW 13.0 13.2 13.0 13.1   NEPRELIM 6.59  --   --   --    WBC 7.7 5.4 5.9 4.2   .0* 115.0* 124.0* 121.0*         Recent Labs   Lab 02/21/25  0346 02/22/25  0454 02/22/25  1312 02/23/25  0503   GLU 92  --  124* 86   BUN 16  --  11 9   CREATSERUM 0.62  --  0.54* 0.54*   EGFRCR 93  --  96 96   CA 7.7*  --  7.8* 7.4*     --  144 144   K 3.8 3.7 4.2 3.4*  3.4*     --  113* 113*   CO2 23.0  --  22.0 22.0       No results for input(s): \"ALT\", \"AST\", \"ALB\", \"AMYLASE\", \"LIPASE\", \"LDH\" in the last 168 hours.    Invalid input(s): \"ALPHOS\", \"TBIL\", \"DBIL\", \"TPROT\"      Imaging:  MRI BRAIN (W+WO) (CPT=70553)    Result Date: 2/21/2025  CONCLUSION:  1. Multiple supra tentorial brain metastases, right paramedian midbrain metastasis, and multiple small cerebellar metastases.  No large area of vasogenic edema/significant mass effect or midline shift.  No hydrocephalus.  No acute infarct.    Dictated by (CST): Nasir Jamil MD on 2/21/2025 at 6:17 PM     Finalized by (CST): Nasir Jamil MD on 2/21/2025 at 6:27 PM             Meds:     INPATIENT MEDICATIONS    Scheduled Medications:      apixaban, 5 mg, BID  famotidine, 20 mg, Daily   Or  famotidine, 20 mg, Daily  mirtazapine, 7.5 mg, Nightly  predniSONE, 10 mg, Daily  levothyroxine, 50 mcg, Daily  levETIRAcetam, 500 mg, Q12H            Drips:         PRN Medications  acetaminophen, 650 mg, Q4H PRN   Or  acetaminophen, 650 mg, Q4H PRN  labetalol, 10 mg, Q10 Min PRN  hydrALAzine, 10 mg, Q2H PRN  ondansetron, 4 mg, Q6H PRN   Or  metoclopramide, 10 mg, Q8H PRN  albuterol, 1 puff, Q6H PRN

## 2025-02-24 NOTE — DIETARY NOTE
ADULT NUTRITION INITIAL ASSESSMENT    Pt is at high nutrition risk.  Pt meets severe malnutrition criteria.      RECOMMENDATIONS TO MD: See nutrition intervention for ONS (oral nutrition supplements)    ADMITTING DIAGNOSIS:  Dehydration [E86.0]  Delirium, acute [R41.0]  Intracranial hemorrhage (HCC) [I62.9]  Primary malignant neoplasm of lung metastatic to other site, unspecified laterality (HCC) [C34.90]  PERTINENT PAST MEDICAL HISTORY:   Past Medical History:    Depression    off Cymbalta    Esophageal reflux    HYPERLIPIDEMIA    Lung cancer (HCC)    with brain mets    OSTEOARTHRITIS    OTHER DISEASES    Hashimoto's--sees Dr. Palmer--Endo    OTHER DISEASES    DEXA-WNL 2008    Reflux    Thyroid disease       PATIENT STATUS: Initial 02/24/25: Pt assessed due to screening at risk for MST 3 for decreased weight and decreased appetite. Per chart review, pt had a 15% weight loss in 2 months. (21 lbs). Pt's intake 20 - 70% x 5 since admit. Last BM 2/23 soft brown. Pt asleep when visited, spoke with son - pt has decreased appetite at home, drinks ensure daily. Son says no problem chewing. Son said pt enjoys Mighty shakes, would like to have TID. NFPE not done at beside, pt was asleep, visual exam coincides with [facility]    FOOD/NUTRITION RELATED HISTORY:  Appetite: Poor to fair  Intake: ~20 - 70 % x5 meals documented since admit  Intake Meeting Needs: No, but oral nutrition supplements (ONS) to maximize  Percent Meals Eaten (last 6 days)       Date/Time Percent Meals Eaten (%)    02/21/25 1200 20 %    02/22/25 0900 35 %    02/22/25 1354 25 %    02/23/25 0930 70 %    02/23/25 1252 40 %           Food Allergies: No Known Food Allergies (NKFA)  Cultural/Ethnic/Caodaism Preferences: Not Obtained    GASTROINTESTINAL: +BM 2/23 soft, brown,  and Loss of appetite    MEDICATIONS: reviewed   apixaban  5 mg Oral BID    famotidine  20 mg Oral Daily    Or    famotidine  20 mg Intravenous Daily     mirtazapine  7.5 mg Oral Nightly    predniSONE  10 mg Oral Daily    levothyroxine  50 mcg Oral Daily    levETIRAcetam  500 mg Intravenous Q12H     LABS: reviewed- Ca low.   Recent Labs     02/22/25  1312 02/23/25  0503 02/24/25  0403   * 86 93   BUN 11 9 10   CREATSERUM 0.54* 0.54* 0.55   CA 7.8* 7.4* 7.8*    144 142   K 4.2 3.4*  3.4* 3.6  3.6   * 113* 111   CO2 22.0 22.0 22.0   OSMOCALC 299* 296* 293       WEIGHT HISTORY:  Patient Weight(s) for the past 336 hrs:   Weight   02/21/25 1200 57.4 kg (126 lb 8.7 oz)   02/20/25 2050 58.1 kg (128 lb 1.4 oz)   02/20/25 1146 73.5 kg (162 lb 0.6 oz)     Wt Readings from Last 10 Encounters:   02/21/25 57.4 kg (126 lb 8.7 oz)   03/23/22 73.5 kg (162 lb)   01/12/22 72.6 kg (160 lb)   12/02/21 68 kg (150 lb)   09/22/21 68 kg (150 lb)   07/15/19 80.6 kg (177 lb 9.6 oz)   05/20/19 81.9 kg (180 lb 9.6 oz)   11/29/18 80.3 kg (177 lb)   10/12/17 80.3 kg (177 lb)   10/04/17 79.4 kg (175 lb)       ANTHROPOMETRICS:  HT: 160 cm (5' 3\")  Wt Readings from Last 1 Encounters:   02/21/25 57.4 kg (126 lb 8.7 oz)     Last weight: Likely accurate  BMI: Body mass index is 22.42 kg/m².  Dosing Weight: 57.4 kg - recent weight, utilized for anthropometric calculations  BMI CLASSIFICATION: 18.5-24.9 kg/m2 - WNL  IBW/lbs (Calculated) Female: 115 lbs  IBW: 115 lbs         109% IBW  Usual Body Wt: 153 lbs       82% UBW    NUTRITION RELATED PHYSICAL FINDINGS:  - Nutrition Focused Physical Exam (NFPE): mild depletion body fat orbital region and triceps region and mild depletion muscle mass temple region, clavicle region, and shoulder region Per RDN note from 1/6/25 [facility]. Agree with findings per visual exam.   - Fluid Accumulation: non-pitting Left Hand (s). See RN documentation for details  - Skin Integrity: at risk. See RN documentation for details    CRITERIA FOR MALNUTRITION DIAGNOSIS:  Criteria for severe malnutrition diagnosis: chronic illness related to wt loss greater  than 5% in 1 month and energy intake less than 75% for greater than 1 month.    NUTRITION DIAGNOSIS/PROBLEM:   Severe Malnutrition related to Chronic - Physiological causes increasing nutrient needs due to illness or condition from lung cancer as evidenced by 21 lb (15%) weight loss in 2 months, mild muscle and fat depletion.   NUTRITION INTERVENTION:     NUTRITION PRESCRIPTION:   Estimated Nutrition needs: --dosing wt of 57.4 kg - wt taken on 2/21/25  Calories: 1607 - 1837 calories/day (28 - 32 calories per kg Dosing wt)  Protein: 69 - 86 g protein/day (1.2 - 1.5 g protein/kg Dosing wt)      - Diet:       Procedures    Regular/General diet Fluid Consistency: Nectar Thick / Mildly Thick Liquids; Texture Consistency: Chopped / Soft & Bite Sized; Is Patient on Accuchecks? No; Misc Restriction: No Straw      - Nutrition Care Plan: Encouraged increased PO intake, Encouraged small frequent meals with emphasis on high calorie/high protein, and Initiated ONS (oral nutritional supplements)  - ONS (Oral Nutrition Supplements)/Meals/Snacks: Mighty Shake (300 calories/ 9 g protein each) TID Chocolate   - Vitamin and mineral supplements: none  - Feeding assistance: meal set up  - Nutrition education:  Discussed with pt family importance of increased PO intake.    - Coordination of nutrition care: collaboration with other providers  - Discharge and transfer of nutrition care to new setting or provider: monitor plans    MONITOR AND EVALUATE/NUTRITION GOALS:  - Food and Nutrient Intake:      Monitor: adequacy of PO intake and adequacy of supplement intake  - Food and Nutrient Administration:      Monitor: N/A  - Anthropometric Measurement:    Monitor weight  - Nutrition Goals:      halt wt loss, PO and supplement greater than 75% of needs, labs within acceptable limits, and minimize lean body mass loss    DIETITIAN FOLLOW UP: RD to follow and monitor nutrition status    Sumi Parnell-[facility]  Dietetic Intern  Phone: [phone]

## 2025-02-24 NOTE — CM/SW NOTE
Prior Authorization - Destination  Destination Type: Skilled nursing facility  Service Provider: *PAC  Payer Communication Destination Comments: [place] [phone]  Prior Authorization Status: Submitted/Pending        Erin Khan DSC

## 2025-02-24 NOTE — PHYSICAL THERAPY NOTE
PHYSICAL THERAPY TREATMENT NOTE - INPATIENT     Room Number: 462/462-A       Presenting Problem: delerium, malginant lung ca with brain mets  Co-Morbidities : lung CA with brain mets    Problem List  Principal Problem:    Delirium, acute  Active Problems:    Dehydration    Primary malignant neoplasm of lung metastatic to other site, unspecified laterality (HCC)    Intracranial hemorrhage (HCC)    Acute hemorrhagic infarction of brain (HCC)      PHYSICAL THERAPY ASSESSMENT   Patient demonstrates limited progress this session, goals  remain in progress.      Patient is requiring maximum assist as a result of the following impairments: decreased functional strength, decreased endurance/aerobic capacity, decreased muscular endurance, and fatigue .     Patient continues to function below baseline with bed mobility, transfers, gait, maintaining seated position, and standing prolonged periods.  Next session anticipate patient to progress bed mobility, transfers, gait, maintaining seated position, and standing prolonged periods.  Physical Therapy will continue to follow patient for duration of hospitalization.    Patient continues to benefit from continued skilled PT services: to promote return to prior level of function and safety with continuous assistance and gradual rehabilitative therapy .    PLAN DURING HOSPITALIZATION  Nursing Mobility Recommendation : Lift Equipment  PT Device Recommendation: Rolling walker;Mechanical lift  PT Treatment Plan: Bed mobility;Body mechanics;Endurance;Energy conservation;Patient education;Family education;Strengthening;Transfer training;Balance training  Frequency (Obs): 3-5x/week     SUBJECTIVE  \"Yeah\"    OBJECTIVE  Precautions: Limb alert - left;Seizure;Bed/chair alarm    WEIGHT BEARING RESTRICTION       PAIN ASSESSMENT   Ratin  Location: denied pain  Management Techniques: Activity promotion;Repositioning    BALANCE  Static Sitting: Poor  Dynamic Sitting: Poor  Static Standing:  Not tested  Dynamic Standing: Not tested    ACTIVITY TOLERANCE  Pulse: 112  Heart Rate Source: Monitor     BP: (!) 131/97  BP Location: Brachial  BP Method: Automatic  Patient Position: Lying     O2 WALK  Oxygen Therapy  SPO2% on Room Air at Rest: 95    AM-PAC '6-Clicks' INPATIENT SHORT FORM - BASIC MOBILITY  How much difficulty does the patient currently have...  Patient Difficulty: Turning over in bed (including adjusting bedclothes, sheets and blankets)?: A Lot   Patient Difficulty: Sitting down on and standing up from a chair with arms (e.g., wheelchair, bedside commode, etc.): Unable   Patient Difficulty: Moving from lying on back to sitting on the side of the bed?: A Lot   How much help from another person does the patient currently need...   Help from Another: Moving to and from a bed to a chair (including a wheelchair)?: Total   Help from Another: Need to walk in hospital room?: Total   Help from Another: Climbing 3-5 steps with a railing?: Total     AM-PAC Score:  Raw Score: 8   Approx Degree of Impairment: 86.62%   Standardized Score (AM-PAC Scale): 28.58   CMS Modifier (G-Code): CM    FUNCTIONAL ABILITY STATUS  Functional Mobility/Gait Assessment  Gait Assistance: Not tested  Rolling: maximum assist  Supine to Sit: maximum assist  Sit to Supine: maximum assist  Sit to Stand:  NT    Skilled Therapy Provided: Pt ok to be seen per AUGUSTA Leo. Pt educ in role of PT and PT POC. Pt's son at bedside. Per pt's son, MD informed them that pt can get oral chemo at [facility]. Family wants pt to have rehab and chemo. Pt's family understands inc burden of care with pt's current level of mobility and are interested in pursuing [facility]. Pt willing to participate in session. Pt maxA for bed mobility and modA to sit EOB. Pt able to respond to cueing to improve upright posture. Pt with tendency into flexion and to lean to L. Pt able to engage LUE and trunk to return to midline, but pt fatigued with less than 5 mins of sitting EOB. Pt  denied dizziness in sitting EOB, then endorsed. Pt's BP stable when assessed after pt returned to supine, HR also stable.    The patient's Approx Degree of Impairment: 86.62% has been calculated based on documentation in the [facility] '6 clicks' Inpatient Daily Activity Short Form.  Research supports that patients with this level of impairment may benefit from LTC, however, pt is performing below baseline and would benefit from [facility]..  Final disposition will be made by interdisciplinary medical team.    THERAPEUTIC EXERCISES  Lower Extremity Ankle pumps  Heel slides  UE hand open/close, elbow flex/ext     Position Supine       Patient End of Session: In bed;With  staff;Needs met;Call light within reach;RN aware of session/findings;Hospital anti-slip socks;SCDs in place;Family present    CURRENT GOALS   Goals to be met by: 3/7/25  Patient Goal Patient's self-stated goal is: not stated   Goal #1 Patient is able to demonstrate supine - sit EOB @ level: minimum assistance      Goal #1   Current Status  maxA   Goal #2 Patient is able to demonstrate transfers Sit to/from Stand at assistance level: minimum assistance with walker - rolling      Goal #2  Current Status  NT   Goal #3 Patient is able to ambulate 10 feet with assist device: walker - rolling at assistance level: minimum assistance   Goal #3   Current Status  NT   Goal #4     Goal #4   Current Status     Goal #5 Patient to demonstrate independence with home activity/exercise instructions provided to patient in preparation for discharge.   Goal #5   Current Status  in progress   Goal #6     Goal #6  Current Status         Therapeutic Activity: 23 minutes

## 2025-02-24 NOTE — SLP NOTE
SPEECH DAILY NOTE - INPATIENT    ASSESSMENT & PLAN   ASSESSMENT  PPE REQUIRED. THIS THERAPIST WORE GLOVES FOR DURATION OF SESSION. HANDS WASHED UPON ENTRANCE/EXIT.    SLP f/u for ongoing dysphagia tx/meal assessment per recommendations of soft bite sized/mildly thick liquids per swallow f/u. RN reports pt tolerates diet and medication well with no overt clinical s/s aspiration. Pt denies any swallowing challenges.     Pt positioned upright in bed, reduced JUANCARLOS/cooperative/family member present. Pt afebrile, tolerating room air with oxygen status 95% prior to the start of oral trials. SLP reviewed aspiration precautions and safe swallowing compensatory strategies with the patient. Safe swallow guidelines remain written on the white board in purple. Patient and family v/u. Provided 1:1 assistance, pt tolerates mildly thick liquids via tsp with no overt clinical signs/symptoms of aspiration. Pt politely declining trials of solids and thin liquids 2/2 fatigue. Oxygen status remained stable t/o the entire session. Recommend remain on current diet with adherence to aspiration precautions and swallow strategies.    SLP to f/u with meal assessment x2-3, monitor  CXR, and VFSS if any overt CSA and/or decline in CXR. RN alerted with results and recommendations.     MOST RECENT CXR -none       Diet Recommendations - Solids: Mechanical soft chopped/ Soft & Bite Sized  Diet Recommendations - Liquids: Nectar thick liquids/ Mildly thick (via tsp)    Compensatory Strategies Recommended: Liquids via spoon  Aspiration Precautions: Upright position;Slow rate;Small bites;No straw  Medication Administration Recommendations: Whole in puree    Patient Experiencing Pain: No              Treatment Plan  Treatment Plan/Recommendations: Aspiration precautions    Interdisciplinary Communication: Plan posted at bedside          GOALS  Goal #1 The patient will tolerate soft bite sized consistency and MILDLY-THICK VIA TSP liquids without overt  signs or symptoms of aspiration with 100 % accuracy over 1-2 session(s).    Revised 2/22   Goal #2 The patient will utilize compensatory strategies as outlined by  BSSE (clinical evaluation) including Slow rate, Small bites, No straws, CONTROLLED liquids, Upright 90 degrees with moderate PRN feeding assistance 90% of the time across 4-5 sessions.    In Progress   Goal #3 The patient will tolerate trial upgrade of SOFT ETC/SOLID consistency and THIN liquids without overt signs or symptoms of aspiration with 100 % accuracy over 3-4 session(s).     Revised 2/22     FOLLOW UP  Follow Up Needed (Documentation Required): Yes  SLP Follow-up Date: 02/25/25  Duration: 2 weeks    Session: 2    If you have any questions, please contact CORI Teresa M.S., CCC-SLP  Speech Language Pathologist  Phone Number Ext. [phone]

## 2025-02-24 NOTE — PROGRESS NOTES
[facility] Hospitalist Progress Note     CC: Hospital Follow up    PCP: Shruthi Simon MD       Assessment/Plan:     Principal Problem:    Delirium, acute  Active Problems:    Dehydration    Primary malignant neoplasm of lung metastatic to other site, unspecified laterality (HCC)    Intracranial hemorrhage (HCC)    Acute hemorrhagic infarction of brain (HCC)      Ms. 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      ?Possible Hemorrhagic metastasis - ruled out  L sided weakness  New right paramedian midbrain metastatic lesion  Syncope  - noted on CT head  - hold Eliquis, ok to restart Eliquis per per NSGY  - MRI brain without bleeding noted  - NSGY and neurology consulted  - continue IV keppra  - stop neurochecks  - PT/OT/SW  - per neurology, 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 Monzon from [facility] he is awaiting approval of an oral immunotherapy that may have some benefit, but not curative, he agreed that [facility] would be best and that immunotherapy (oral medication) once approved could be initiated at [facility]    Lung Ca with recurrent malignant pleural effusion  PE hx   Small R PTX - POA   Chronic hypoxic respiratory failure on home O2  - S/p R sided PleurX placement 1/31/25  - drain scheduled MWF 9:30AM, up to 1L  - Already has 2L O2 at home - at baseline now     Lung cancer with brain mets  - follow-up oncology as outpatient  - planned to start chemotherapy this week    - per family, had recent MRI brain a few weeks ago that showed decrease in size of brain mets  - has been on prednisone taper since November, currently on 10 mg daily    DVT/PE  - restart Eliquis   - diagnosed 12/28/24 with significant bilateral PE  w    Hypothyroidism  - Continue synthroid     ACP  - CODE- DNR/full- confirmed with POA  - POA- son- updated at bedside   - lives at home with son     FN:  - IVF: none  - Diet: regular     DVT Prophy: SCD, eliquis   Lines: PVI     Dispo: needs SHAR    Discussed with Son at bedside and Brayan over the phone      Outpatient records or previous hospital records reviewed.      Further recommendations pending patient's clinical course.  [facility] hospitalist to continue to follow patient while in house     Patient and/or patient's family given opportunity to ask questions and note understanding and agreeing with therapeutic plan as outlined     Jose Carlos Mejia MD  [facility] Hospitalist  Answering Service number: [phone]     Subjective:     Weak and tired, appetite poor, not able to get up and move much    OBJECTIVE:    Blood pressure (!) 123/91, pulse 110, temperature 97.9 °F (36.6 °C), temperature source Oral, resp. rate 16, height 5' 3\" (1.6 m), weight 126 lb 8.7 oz (57.4 kg), SpO2 95%, not currently breastfeeding.    Temp:  [97.9 °F (36.6 °C)-98.1 °F (36.7 °C)] 97.9 °F (36.6 °C)  Pulse:  [] 110  Resp:  [16] 16  BP: (122-123)/(75-91) 123/91  SpO2:  [94 %-95 %] 95 %      Intake/Output:    Intake/Output Summary (Last 24 hours) at 2/24/2025 1351  Last data filed at 2/24/2025 1300  Gross per 24 hour   Intake --   Output 250 ml   Net -250 ml       Last 3 Weights   02/21/25 1200 126 lb 8.7 oz (57.4 kg)   02/20/25 2050 128 lb 1.4 oz (58.1 kg)   02/20/25 1146 162 lb 0.6 oz (73.5 kg)   03/23/22 0942 162 lb (73.5 kg)   01/12/22 1248 160 lb (72.6 kg)       Exam  GEN: female in NAD,    HEENT: NC   Pulm: CTAB, no crackles or wheezes  CV: RRR, no murmurs  ABD: Soft, non-tender, non-distended, +BS  MSK:  LUE 3/5, L hand  4/5, LLE 3/5, RUE 5/5, RLE 4/5  Neuro: weakness as above, CN intact, sensory intact, L side neglect  Psych: Affect- normal       Data Review:       Labs:     Recent Labs   Lab 02/20/25  1136 02/21/25  0346 02/22/25  1312  02/23/25  0503 02/24/25  0403   RBC 3.55*   < > 3.09* 2.84* 3.00*   HGB 12.2   < > 10.9* 9.6* 10.3*   HCT 35.7   < > 30.8* 28.3* 30.3*   .6*   < > 99.7 99.6 101.0*   MCH 34.4*   < > 35.3* 33.8 34.3*   MCHC 34.2   < > 35.4 33.9 34.0   RDW 13.0   < > 13.0 13.1 13.2   NEPRELIM 6.59  --   --   --   --    WBC 7.7   < > 5.9 4.2 4.5   .0*   < > 124.0* 121.0* 127.0*    < > = values in this interval not displayed.         Recent Labs   Lab 02/22/25  1312 02/23/25  0503 02/24/25  0403   * 86 93   BUN 11 9 10   CREATSERUM 0.54* 0.54* 0.55   EGFRCR 96 96 96   CA 7.8* 7.4* 7.8*    144 142   K 4.2 3.4*  3.4* 3.6  3.6   * 113* 111   CO2 22.0 22.0 22.0       No results for input(s): \"ALT\", \"AST\", \"ALB\", \"AMYLASE\", \"LIPASE\", \"LDH\" in the last 168 hours.    Invalid input(s): \"ALPHOS\", \"TBIL\", \"DBIL\", \"TPROT\"      Imaging:  MRI BRAIN (W+WO) (CPT=70553)    Result Date: 2/21/2025  CONCLUSION:  1. Multiple supra tentorial brain metastases, right paramedian midbrain metastasis, and multiple small cerebellar metastases.  No large area of vasogenic edema/significant mass effect or midline shift.  No hydrocephalus.  No acute infarct.    Dictated by (CST): Nasir Jamil MD on 2/21/2025 at 6:17 PM     Finalized by (CST): Nasir Jamil MD on 2/21/2025 at 6:27 PM             Meds:     INPATIENT MEDICATIONS    Scheduled Medications:      apixaban, 5 mg, BID  famotidine, 20 mg, Daily   Or  famotidine, 20 mg, Daily  mirtazapine, 7.5 mg, Nightly  predniSONE, 10 mg, Daily  levothyroxine, 50 mcg, Daily  levETIRAcetam, 500 mg, Q12H            Drips:         PRN Medications  acetaminophen, 650 mg, Q4H PRN   Or  acetaminophen, 650 mg, Q4H PRN  labetalol, 10 mg, Q10 Min PRN  hydrALAzine, 10 mg, Q2H PRN  ondansetron, 4 mg, Q6H PRN   Or  metoclopramide, 10 mg, Q8H PRN  albuterol, 1 puff, Q6H PRN

## 2025-02-24 NOTE — PLAN OF CARE
Damari is A&Ox2, lethargic overnight but easily arousable. Room air. Tele monitor in place. Pleurx to be drained today. Purewick in place, incontinent x2. No reported pain. Chopped/nectar thickened liquids diet. Max assist. Bed alarm on and call light within reach.     Problem: Patient Centered Care  Goal: Patient preferences are identified and integrated in the patient's plan of care  Description: Interventions:  - What would you like us to know as we care for you? \"My family helps take care of me.\"  - Provide timely, complete, and accurate information to patient/family  - Incorporate patient and family knowledge, values, beliefs, and cultural backgrounds into the planning and delivery of care  - Encourage patient/family to participate in care and decision-making at the level they choose  - Honor patient and family perspectives and choices  Outcome: Progressing     Problem: Patient/Family Goals  Goal: Patient/Family Long Term Goal  Description: Patient's Long Term Goal: Feel better    Interventions:  - Educate on taking medications as prescribed  - Monitor vitals  -Up as tolerated  - See additional Care Plan goals for specific interventions  Outcome: Progressing  Goal: Patient/Family Short Term Goal  Description: Patient's Short Term Goal: No syncopal episodes    Interventions:   - EKG  - EEG  - Neurology consult  - Monitor vitals  - MRI  - See additional Care Plan goals for specific interventions  Outcome: Progressing     Problem: NEUROLOGICAL - ADULT  Goal: Achieves stable or improved neurological status  Description: INTERVENTIONS  - Assess for and report changes in neurological status  - Initiate measures to prevent increased intracranial pressure  - Maintain blood pressure and fluid volume within ordered parameters to optimize cerebral perfusion and minimize risk of hemorrhage  - Monitor temperature, glucose, and sodium. Initiate appropriate interventions as ordered  Outcome: Progressing  Goal: Absence of  seizures  Description: INTERVENTIONS  - Monitor for seizure activity  - Administer anti-seizure medications as ordered  - Monitor neurological status  Outcome: Progressing  Goal: Remains free of injury related to seizure activity  Description: INTERVENTIONS:  - Maintain airway, patient safety  and administer oxygen as ordered  - Monitor patient for seizure activity, document and report duration and description of seizure to MD/LIP  - If seizure occurs, turn patient to side and suction secretions as needed  - Reorient patient post seizure  - Seizure pads on all 4 side rails  - Instruct patient/family to notify RN of any seizure activity  - Instruct patient/family to call for assistance with activity based on assessment  Outcome: Progressing  Goal: Achieves maximal functionality and self care  Description: INTERVENTIONS  - Monitor swallowing and airway patency with patient fatigue and changes in neurological status  - Encourage and assist patient to increase activity and self care with guidance from PT/OT  - Encourage visually impaired, hearing impaired and aphasic patients to use assistive/communication devices  Outcome: Progressing     Problem: Impaired Cognition  Goal: Patient will exhibit improved attention, thought processing and/or memory  Description: Interventions:  Outcome: Progressing     Problem: PAIN - ADULT  Goal: Verbalizes/displays adequate comfort level or patient's stated pain goal  Description: INTERVENTIONS:  - Encourage pt to monitor pain and request assistance  - Assess pain using appropriate pain scale  - Administer analgesics based on type and severity of pain and evaluate response  - Implement non-pharmacological measures as appropriate and evaluate response  - Consider cultural and social influences on pain and pain management  - Manage/alleviate anxiety  - Utilize distraction and/or relaxation techniques  - Monitor for opioid side effects  - Notify MD/LIP if interventions unsuccessful or  patient reports new pain  - Anticipate increased pain with activity and pre-medicate as appropriate  Outcome: Progressing     Problem: SAFETY ADULT - FALL  Goal: Free from fall injury  Description: INTERVENTIONS:  - Assess pt frequently for physical needs  - Identify cognitive and physical deficits and behaviors that affect risk of falls.  - [place] fall precautions as indicated by assessment.  - Educate pt/family on patient safety including physical limitations  - Instruct pt to call for assistance with activity based on assessment  - Modify environment to reduce risk of injury  - Provide assistive devices as appropriate  - Consider OT/PT consult to assist with strengthening/mobility  - Encourage toileting schedule  Outcome: Progressing     Problem: DISCHARGE PLANNING  Goal: Discharge to home or other facility with appropriate resources  Description: INTERVENTIONS:  - Identify barriers to discharge w/pt and caregiver  - Include patient/family/discharge partner in discharge planning  - Arrange for needed discharge resources and transportation as appropriate  - Identify discharge learning needs (meds, wound care, etc)  - Arrange for interpreters to assist at discharge as needed  - Consider post-discharge preferences of patient/family/discharge partner  - Complete POLST form as appropriate  - Assess patient's ability to be responsible for managing their own health  - Refer to Case Management Department for coordinating discharge planning if the patient needs post-hospital services based on physician/LIP order or complex needs related to functional status, cognitive ability or social support system  Outcome: Progressing

## 2025-02-25 PROBLEM — Z51.5 PALLIATIVE CARE BY SPECIALIST: Status: ACTIVE | Noted: 2025-02-25

## 2025-02-25 LAB
ANION GAP SERPL CALC-SCNC: 10 MMOL/L (ref 0–18)
BUN BLD-MCNC: 12 MG/DL (ref 9–23)
BUN/CREAT SERPL: 21.8 (ref 10–20)
CALCIUM BLD-MCNC: 7.8 MG/DL (ref 8.7–10.4)
CHLORIDE SERPL-SCNC: 109 MMOL/L (ref 98–112)
CO2 SERPL-SCNC: 23 MMOL/L (ref 21–32)
CREAT BLD-MCNC: 0.55 MG/DL
DEPRECATED RDW RBC AUTO: 45.5 FL (ref 35.1–46.3)
EGFRCR SERPLBLD CKD-EPI 2021: 96 ML/MIN/1.73M2 (ref 60–?)
ERYTHROCYTE [DISTWIDTH] IN BLOOD BY AUTOMATED COUNT: 13 % (ref 11–15)
GLUCOSE BLD-MCNC: 97 MG/DL (ref 70–99)
HCT VFR BLD AUTO: 30.6 %
HGB BLD-MCNC: 10.7 G/DL
MCH RBC QN AUTO: 34.2 PG (ref 26–34)
MCHC RBC AUTO-ENTMCNC: 35 G/DL (ref 31–37)
MCV RBC AUTO: 97.8 FL
OSMOLALITY SERPL CALC.SUM OF ELEC: 294 MOSM/KG (ref 275–295)
PLATELET # BLD AUTO: 136 10(3)UL (ref 150–450)
POTASSIUM SERPL-SCNC: 3.6 MMOL/L (ref 3.5–5.1)
POTASSIUM SERPL-SCNC: 3.6 MMOL/L (ref 3.5–5.1)
RBC # BLD AUTO: 3.13 X10(6)UL
SODIUM SERPL-SCNC: 142 MMOL/L (ref 136–145)
WBC # BLD AUTO: 4.7 X10(3) UL (ref 4–11)

## 2025-02-25 PROCEDURE — 99497 ADVNCD CARE PLAN 30 MIN: CPT | Performed by: INTERNAL MEDICINE

## 2025-02-25 PROCEDURE — 99223 1ST HOSP IP/OBS HIGH 75: CPT | Performed by: INTERNAL MEDICINE

## 2025-02-25 RX ORDER — DEXAMETHASONE SODIUM PHOSPHATE 4 MG/ML
4 VIAL (ML) INJECTION 3 TIMES DAILY
Status: DISCONTINUED | OUTPATIENT
Start: 2025-02-25 | End: 2025-03-05

## 2025-02-25 RX ORDER — POTASSIUM CHLORIDE 1500 MG/1
40 TABLET, EXTENDED RELEASE ORAL EVERY 4 HOURS
Status: DISCONTINUED | OUTPATIENT
Start: 2025-02-25 | End: 2025-02-25

## 2025-02-25 RX ORDER — MODAFINIL 100 MG/1
100 TABLET ORAL DAILY
Status: DISCONTINUED | OUTPATIENT
Start: 2025-02-26 | End: 2025-02-27

## 2025-02-25 NOTE — PROGRESS NOTES
[facility] Hospitalist Progress Note     CC: Hospital Follow up    PCP: Shruthi Simon MD       Assessment/Plan:     Principal Problem:    Delirium, acute  Active Problems:    Dehydration    Primary malignant neoplasm of lung metastatic to other site, unspecified laterality (HCC)    Intracranial hemorrhage (HCC)    Acute hemorrhagic infarction of brain (HCC)      Ms. 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, hypothyroidism who presented with syncopal episode while waiting for outpatient appt and recent fall at home with head trauma, found to have new right brain stem metastatic lesion,       L sided weakness  New right paramedian midbrain metastatic lesion  Syncope  - noted on CT head  - hold Eliquis, ok to restart Eliquis per per NSGY  - MRI brain without bleeding noted  - NSGY and neurology consulted  - continue keppra  - stop neurochecks  - PT/OT/SW  - per neurology, 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 Monzon from [facility] he is awaiting approval of an oral immunotherapy that may have some benefit, but not curative, he agreed that [facility] would be best and that immunotherapy (oral medication) once approved could be initiated at [facility]  -palliative care eval    Lung Ca with recurrent malignant pleural effusion  PE hx   Small R PTX - POA   Chronic hypoxic respiratory failure on home O2  - S/p R sided PleurX placement 1/31/25  - drain scheduled MWF 9:30AM, up to 1L  - Already has 2L O2 at home - at baseline now     Lung cancer with brain mets  - follow-up oncology as outpatient  - planned to start chemotherapy this week    - per family, had recent MRI brain a few weeks ago that showed decrease in size of brain mets  - has been on prednisone taper since November, currently on 10 mg daily    DVT/PE  - restart Eliquis   - diagnosed 12/28/24 with  significant bilateral PE w    Hypothyroidism  - Continue synthroid     ACP  - CODE- DNR/full- confirmed with POA  - POA- son- updated at bedside   - lives at home with son     FN:  - IVF: none  - Diet: regular     DVT Prophy: SCD, eliquis   Lines: PVI     Dispo: needs SHAR    Discussed with Son at bedside     Outpatient records or previous hospital records reviewed.      Further recommendations pending patient's clinical course.  [facility] hospitalist to continue to follow patient while in house     Patient and/or patient's family given opportunity to ask questions and note understanding and agreeing with therapeutic plan as outlined     Jose Carlos Mejia MD  [facility] Hospitalist  Answering Service number: [phone]     Subjective:     Weak and tired, appetite poor,  alert to voice follows commands     OBJECTIVE:    Blood pressure 122/82, pulse 110, temperature 97.4 °F (36.3 °C), temperature source Oral, resp. rate 18, height 5' 3\" (1.6 m), weight 128 lb (58.1 kg), SpO2 94%, not currently breastfeeding.    Temp:  [97.4 °F (36.3 °C)-98 °F (36.7 °C)] 97.4 °F (36.3 °C)  Pulse:  [107-118] 110  Resp:  [16-18] 18  BP: (113-131)/(80-97) 122/82  SpO2:  [94 %-96 %] 94 %      Intake/Output:    Intake/Output Summary (Last 24 hours) at 2/25/2025 1222  Last data filed at 2/25/2025 0507  Gross per 24 hour   Intake --   Output 1150 ml   Net -1150 ml       Last 3 Weights   02/25/25 0507 128 lb (58.1 kg)   02/21/25 1200 126 lb 8.7 oz (57.4 kg)   02/20/25 2050 128 lb 1.4 oz (58.1 kg)   02/20/25 1146 162 lb 0.6 oz (73.5 kg)   03/23/22 0942 162 lb (73.5 kg)   01/12/22 1248 160 lb (72.6 kg)       Exam  GEN: female in NAD,    HEENT: NC   Pulm: CTAB, no crackles or wheezes  CV: RRR, no murmurs  ABD: Soft, non-tender, non-distended, +BS  MSK:  LUE 3/5, L hand  4/5, LLE 3/5, RUE 5/5, RLE 4/5  Neuro: weakness as above, CN intact, sensory intact, L side neglect         Data Review:       Labs:     Recent Labs   Lab 02/20/25  1136 02/21/25  0347  02/23/25  0503 02/24/25  0403 02/25/25  0445   RBC 3.55*   < > 2.84* 3.00* 3.13*   HGB 12.2   < > 9.6* 10.3* 10.7*   HCT 35.7   < > 28.3* 30.3* 30.6*   .6*   < > 99.6 101.0* 97.8   MCH 34.4*   < > 33.8 34.3* 34.2*   MCHC 34.2   < > 33.9 34.0 35.0   RDW 13.0   < > 13.1 13.2 13.0   NEPRELIM 6.59  --   --   --   --    WBC 7.7   < > 4.2 4.5 4.7   .0*   < > 121.0* 127.0* 136.0*    < > = values in this interval not displayed.         Recent Labs   Lab 02/23/25  0503 02/24/25  0403 02/25/25  0445   GLU 86 93 97   BUN 9 10 12   CREATSERUM 0.54* 0.55 0.55   EGFRCR 96 96 96   CA 7.4* 7.8* 7.8*    142 142   K 3.4*  3.4* 3.6  3.6 3.6  3.6   * 111 109   CO2 22.0 22.0 23.0       No results for input(s): \"ALT\", \"AST\", \"ALB\", \"AMYLASE\", \"LIPASE\", \"LDH\" in the last 168 hours.    Invalid input(s): \"ALPHOS\", \"TBIL\", \"DBIL\", \"TPROT\"      Imaging:  No results found.      Meds:     INPATIENT MEDICATIONS    Scheduled Medications:      potassium chloride, 40 mEq, Once  apixaban, 5 mg, BID  famotidine, 20 mg, Daily   Or  famotidine, 20 mg, Daily  mirtazapine, 7.5 mg, Nightly  predniSONE, 10 mg, Daily  levothyroxine, 50 mcg, Daily  levETIRAcetam, 500 mg, Q12H            Drips:         PRN Medications  acetaminophen, 650 mg, Q4H PRN   Or  acetaminophen, 650 mg, Q4H PRN  labetalol, 10 mg, Q10 Min PRN  hydrALAzine, 10 mg, Q2H PRN  ondansetron, 4 mg, Q6H PRN   Or  metoclopramide, 10 mg, Q8H PRN  albuterol, 1 puff, Q6H PRN

## 2025-02-25 NOTE — SLP NOTE
ADULT SWALLOWING RE-EVALUATION    ASSESSMENT    ASSESSMENT/OVERALL IMPRESSION:  Received order for BSSE.  Pt is already being seen by speech therapy.  Pt seen for Re-BSSE during swallowing therapy.  Collaborated with RN. RN reports increased coughing at meals and medications.  Pt seen at bedside with her son present.  The pt with reduced JUANCARLOS requiring constant cues to remain awake during testing.  Vocal quality is weak with breathy quality.  Repositioned the pt to sitting upright in bed.  The pt with increased weakness and leaning towards the right side having a hard time holding head upright.  Assessed the pt with po trials of puree, soft solids, mildly thick liquids by teaspoon, and moderately thick liquids by teaspoon.  Adequate oral acceptance with no anterior spillage.  Oral phase of swallow delayed with reduced bolus control and propulsion.  Bite strength reduced with slow and prolonged mastication on soft bolus.  Minimal-mild oral residue was cleared with a multiple dry swallow.  Pharyngeal phase of swallow delayed with reduced hyolaryngeal elevation.  Overt clinical signs of aspiration with coughing, wet vocal quality, and oxygen desaturations to 80s with teaspoon amounts of mildly thick liquids and full teaspoon amounts of puree.  No overt clinical signs of aspiration with teaspoon amounts of moderately thick liquids, 1/2 teaspoon amounts of puree, and soft solids.  Discussed recommendations of downgrading to puree secondary to reduced alertness and endurance during a feeding.  The pt's son does not want soft bite sized bite removed secondary to his mother having increased alertness periodically and fears if she is only on puree that she will regress and refuse to eat.  The family reports ordering some puree type items to feed when she becomes fatigued.  Recommend to downgrade diet to soft easy to chew with teaspoon amounts of mildly thick liquids and feed only when the pt is alert.        Pt presents with  moderate oral dysphagia and possible pharyngeal dysfunction. Collaborated with RN regarding Pt's swallowing plan of care. BSE results/recommendations discussed with Pt/son. Pt/son v/u. Pt would benefit from additional reinforcement. Monitor for diet advancement as JUANCARLOS improves.  Swallowing precautions written on white board in room for reinforcement/carry-over of skills for Pt, family and staff. Call light within Pt's reach upon SLP discharge from room.          PLAN:    To f/u x3-4 sessions to ensure safe intake of diet and reinforce swallowing/aspiration precautions. To monitor for new CXR results. Diet upgrade as tolerated. VFSS IF appropriate. Family education to be continued as available.         RECOMMENDATIONS   Diet Recommendations - Solids: Mechanical soft chopped/ Soft & Bite Sized (Family does not want solids decreased to puree)  Diet Recommendations - Liquids: Honey thick liquids/ Moderately thick (by teaspoon)                        Compensatory Strategies Recommended: Liquids via spoon;Multiple swallows;Alternate consistencies (Feed onlyl when awake)  Aspiration Precautions: Upright position;Slow rate;Small bites;No straw;1:1 feeding (1/2 teaspoon amounts)  Medication Administration Recommendations: Crushed in puree (1/2 teaspoon amounts)  Treatment Plan/Recommendations: Aspiration precautions    HISTORY   MEDICAL HISTORY  Reason for Referral: R/O aspiration    Problem List  Principal Problem:    Delirium, acute  Active Problems:    Dehydration    Primary malignant neoplasm of lung metastatic to other site, unspecified laterality (HCC)    Intracranial hemorrhage (HCC)    Acute hemorrhagic infarction of brain (HCC)      Past Medical History  Past Medical History:    Depression    off Cymbalta    Esophageal reflux    HYPERLIPIDEMIA    Lung cancer (HCC)    with brain mets    OSTEOARTHRITIS    OTHER DISEASES    Hashimoto's--sees Dr. Palmer--Endo    OTHER DISEASES    DEXA-WNL 2008    Reflux    Thyroid  disease       Prior Living Situation: Home with support  Diet Prior to Admission: Regular;Thin liquids  Precautions: Aspiration    Patient/Family Goals: Family wants to see po intake increase    SWALLOWING HISTORY  Current Diet Consistency: Mechanical soft chopped/ Soft & Bite Sized;Nectar thick liquids/ Mildly thick  Dysphagia History: Pt admitted 2/20/25 with delirium, acute. Pt on solid/thin liquids at home with six children rotating weeks to stay with Pt. No PMH of dysphagia at [facility]. Last [facility] swallow note 12/26/24 indicated clear thin liquid diet.   Imaging Results:   CT Brain 2/20/25:  CONCLUSION: Very small subtle area of decreased attenuation within the left frontoparietal lobe with small focal area of of peripheral increased density.  The findings could represent a hemorrhagic metastasis.  However this is an equivocal finding.    There is no mass effect or associated abnormality.  There are no similar appearing areas within the brain. The remainder of the exam is unremarkable.      SUBJECTIVE   Pt with reduced JUANCARLOS. Oriented to person and place.    OBJECTIVE   ORAL MOTOR EXAMINATION  Dentition: Natural;Functional  Symmetry: Within Functional Limits  Strength: Overall reduced  Tone: Overall reduced  Range of Motion: Overall reduced  Rate of Motion: Reduced    Voice Quality: Weak;Breathy  Respiratory Status: Unlabored  Consistencies Trialed: Nectar thick liquids;Honey thick liquids;Puree;Soft solid  Method of Presentation: Staff/Clinician assistance;Spoon;Cup  Patient Positioned: Upright;Leaning to right    Oral Phase of Swallow: Impaired  Bolus Retrieval: Intact  Bilabial Seal: Intact  Bolus Formation: Impaired  Bolus Propulsion: Impaired  Mastication: Impaired  Retention: Impaired    Pharyngeal Phase of Swallow: Appears Impaired  Laryngeal Elevation Timing: Appears impaired  Laryngeal Elevation Strength: Appears impaired  Laryngeal Elevation Coordination: Appears impaired  (Please note: Silent aspiration  cannot be evaluated clinically. Videofluoroscopic Swallow Study is required to rule-out silent aspiration.)    Esophageal Phase of Swallow: No complaints consistent with possible esophageal involvement                GOALS  Goal #1 The patient will tolerate soft bite size consistency and teaspoon amounts of moderately thick liquids without overt signs or symptoms of aspiration with 100 % accuracy over '2-3 session(s).  In Progress   Goal #2 The patient/family/caregiver will demonstrate understanding and implementation of aspiration precautions and swallow strategies independently over 2 session(s).    In Progress   Goal #3 The patient will utilize compensatory strategies as outlined by  BSSE (clinical evaluation) including Slow rate, Small bites, Multiple swallows, Alternate liquids/solids, No straws, Upright 90 degrees, Liquids via tsp amount only, 1/2 tsp amounts, Feed patient, feed pt only when alert with mod assistance 95 % of the time across 2 sessions.  In Progress   Goal #4 The patient will tolerate trial upgrade of soft solids consistency and mildly thick liquids without overt signs or symptoms of aspiration with 100 % accuracy over 2 session(s).    In Progress     FOLLOW UP  Treatment Plan/Recommendations: Aspiration precautions  Duration: 1 week  Follow Up Needed (Documentation Required): Yes  SLP Follow-up Date: 02/26/25    Thank you for your referral.   If you have any questions, please contact    Barbi Boyle MS/CCC-SLP  Speech Language Pathologist  [facility]  EXT. [phone]

## 2025-02-25 NOTE — PLAN OF CARE
Patient lethargic, alert x2. On RA. Rolls side to side, L side weakness. Saline locked. Purewick in place. One incontinent bm overnight. Takes meds crushed in pudding. Denies pain or nausea. Tolerating nectar thick, but with poor appetite. Aspiration precautions followed. Seizure precautions in place. No calls from tele. Pleurx drained overnight, 550cc out. Frequent rounding provided. Call light in reach.     Problem: Patient Centered Care  Goal: Patient preferences are identified and integrated in the patient's plan of care  Description: Interventions:  - What would you like us to know as we care for you? \"My family helps take care of me.\"  - Provide timely, complete, and accurate information to patient/family  - Incorporate patient and family knowledge, values, beliefs, and cultural backgrounds into the planning and delivery of care  - Encourage patient/family to participate in care and decision-making at the level they choose  - Honor patient and family perspectives and choices  Outcome: Progressing     Problem: Patient/Family Goals  Goal: Patient/Family Long Term Goal  Description: Patient's Long Term Goal: Feel better    Interventions:  - Educate on taking medications as prescribed  - Monitor vitals  -Up as tolerated  - See additional Care Plan goals for specific interventions  Outcome: Progressing  Goal: Patient/Family Short Term Goal  Description: Patient's Short Term Goal: No syncopal episodes    Interventions:   - EKG  - EEG  - Neurology consult  - Monitor vitals  - MRI  - See additional Care Plan goals for specific interventions  Outcome: Progressing     Problem: NEUROLOGICAL - ADULT  Goal: Achieves stable or improved neurological status  Description: INTERVENTIONS  - Assess for and report changes in neurological status  - Initiate measures to prevent increased intracranial pressure  - Maintain blood pressure and fluid volume within ordered parameters to optimize cerebral perfusion and minimize risk of  hemorrhage  - Monitor temperature, glucose, and sodium. Initiate appropriate interventions as ordered  Outcome: Progressing  Goal: Absence of seizures  Description: INTERVENTIONS  - Monitor for seizure activity  - Administer anti-seizure medications as ordered  - Monitor neurological status  Outcome: Progressing  Goal: Remains free of injury related to seizure activity  Description: INTERVENTIONS:  - Maintain airway, patient safety  and administer oxygen as ordered  - Monitor patient for seizure activity, document and report duration and description of seizure to MD/LIP  - If seizure occurs, turn patient to side and suction secretions as needed  - Reorient patient post seizure  - Seizure pads on all 4 side rails  - Instruct patient/family to notify RN of any seizure activity  - Instruct patient/family to call for assistance with activity based on assessment  Outcome: Progressing  Goal: Achieves maximal functionality and self care  Description: INTERVENTIONS  - Monitor swallowing and airway patency with patient fatigue and changes in neurological status  - Encourage and assist patient to increase activity and self care with guidance from PT/OT  - Encourage visually impaired, hearing impaired and aphasic patients to use assistive/communication devices  Outcome: Progressing     Problem: Impaired Cognition  Goal: Patient will exhibit improved attention, thought processing and/or memory  Description: Interventions:  Outcome: Progressing     Problem: PAIN - ADULT  Goal: Verbalizes/displays adequate comfort level or patient's stated pain goal  Description: INTERVENTIONS:  - Encourage pt to monitor pain and request assistance  - Assess pain using appropriate pain scale  - Administer analgesics based on type and severity of pain and evaluate response  - Implement non-pharmacological measures as appropriate and evaluate response  - Consider cultural and social influences on pain and pain management  - Manage/alleviate  anxiety  - Utilize distraction and/or relaxation techniques  - Monitor for opioid side effects  - Notify MD/LIP if interventions unsuccessful or patient reports new pain  - Anticipate increased pain with activity and pre-medicate as appropriate  Outcome: Progressing     Problem: SAFETY ADULT - FALL  Goal: Free from fall injury  Description: INTERVENTIONS:  - Assess pt frequently for physical needs  - Identify cognitive and physical deficits and behaviors that affect risk of falls.  - [place] fall precautions as indicated by assessment.  - Educate pt/family on patient safety including physical limitations  - Instruct pt to call for assistance with activity based on assessment  - Modify environment to reduce risk of injury  - Provide assistive devices as appropriate  - Consider OT/PT consult to assist with strengthening/mobility  - Encourage toileting schedule  Outcome: Progressing     Problem: DISCHARGE PLANNING  Goal: Discharge to home or other facility with appropriate resources  Description: INTERVENTIONS:  - Identify barriers to discharge w/pt and caregiver  - Include patient/family/discharge partner in discharge planning  - Arrange for needed discharge resources and transportation as appropriate  - Identify discharge learning needs (meds, wound care, etc)  - Arrange for interpreters to assist at discharge as needed  - Consider post-discharge preferences of patient/family/discharge partner  - Complete POLST form as appropriate  - Assess patient's ability to be responsible for managing their own health  - Refer to Case Management Department for coordinating discharge planning if the patient needs post-hospital services based on physician/LIP order or complex needs related to functional status, cognitive ability or social support system  Outcome: Progressing

## 2025-02-25 NOTE — PLAN OF CARE
Leti is alert and oriented x 4 but Is extremely lethargic, she is tolerating very little of meals and coughing after bites and sips, Speech reevaluated today making the patient honey thickened liquids. Patient denies pain and nausea. Family at bedside, being repositioned and checked for incontinence frequently, purewick in place, call light within reach.  Problem: Patient Centered Care  Goal: Patient preferences are identified and integrated in the patient's plan of care  Description: Interventions:  - What would you like us to know as we care for you? \"My family helps take care of me.\"  - Provide timely, complete, and accurate information to patient/family  - Incorporate patient and family knowledge, values, beliefs, and cultural backgrounds into the planning and delivery of care  - Encourage patient/family to participate in care and decision-making at the level they choose  - Honor patient and family perspectives and choices  Outcome: Progressing     Problem: Patient/Family Goals  Goal: Patient/Family Long Term Goal  Description: Patient's Long Term Goal: Feel better    Interventions:  - Educate on taking medications as prescribed  - Monitor vitals  -Up as tolerated  - See additional Care Plan goals for specific interventions  Outcome: Progressing  Goal: Patient/Family Short Term Goal  Description: Patient's Short Term Goal: No syncopal episodes    Interventions:   - EKG  - EEG  - Neurology consult  - Monitor vitals  - MRI  - See additional Care Plan goals for specific interventions  Outcome: Progressing     Problem: NEUROLOGICAL - ADULT  Goal: Achieves stable or improved neurological status  Description: INTERVENTIONS  - Assess for and report changes in neurological status  - Initiate measures to prevent increased intracranial pressure  - Maintain blood pressure and fluid volume within ordered parameters to optimize cerebral perfusion and minimize risk of hemorrhage  - Monitor temperature, glucose, and sodium.  Initiate appropriate interventions as ordered  Outcome: Progressing  Goal: Absence of seizures  Description: INTERVENTIONS  - Monitor for seizure activity  - Administer anti-seizure medications as ordered  - Monitor neurological status  Outcome: Progressing  Goal: Remains free of injury related to seizure activity  Description: INTERVENTIONS:  - Maintain airway, patient safety  and administer oxygen as ordered  - Monitor patient for seizure activity, document and report duration and description of seizure to MD/LIP  - If seizure occurs, turn patient to side and suction secretions as needed  - Reorient patient post seizure  - Seizure pads on all 4 side rails  - Instruct patient/family to notify RN of any seizure activity  - Instruct patient/family to call for assistance with activity based on assessment  Outcome: Progressing  Goal: Achieves maximal functionality and self care  Description: INTERVENTIONS  - Monitor swallowing and airway patency with patient fatigue and changes in neurological status  - Encourage and assist patient to increase activity and self care with guidance from PT/OT  - Encourage visually impaired, hearing impaired and aphasic patients to use assistive/communication devices  Outcome: Progressing     Problem: Impaired Cognition  Goal: Patient will exhibit improved attention, thought processing and/or memory  Description: Interventions:    Outcome: Progressing     Problem: PAIN - ADULT  Goal: Verbalizes/displays adequate comfort level or patient's stated pain goal  Description: INTERVENTIONS:  - Encourage pt to monitor pain and request assistance  - Assess pain using appropriate pain scale  - Administer analgesics based on type and severity of pain and evaluate response  - Implement non-pharmacological measures as appropriate and evaluate response  - Consider cultural and social influences on pain and pain management  - Manage/alleviate anxiety  - Utilize distraction and/or relaxation  techniques  - Monitor for opioid side effects  - Notify MD/LIP if interventions unsuccessful or patient reports new pain  - Anticipate increased pain with activity and pre-medicate as appropriate  Outcome: Progressing     Problem: SAFETY ADULT - FALL  Goal: Free from fall injury  Description: INTERVENTIONS:  - Assess pt frequently for physical needs  - Identify cognitive and physical deficits and behaviors that affect risk of falls.  - [place] fall precautions as indicated by assessment.  - Educate pt/family on patient safety including physical limitations  - Instruct pt to call for assistance with activity based on assessment  - Modify environment to reduce risk of injury  - Provide assistive devices as appropriate  - Consider OT/PT consult to assist with strengthening/mobility  - Encourage toileting schedule  Outcome: Progressing     Problem: DISCHARGE PLANNING  Goal: Discharge to home or other facility with appropriate resources  Description: INTERVENTIONS:  - Identify barriers to discharge w/pt and caregiver  - Include patient/family/discharge partner in discharge planning  - Arrange for needed discharge resources and transportation as appropriate  - Identify discharge learning needs (meds, wound care, etc)  - Arrange for interpreters to assist at discharge as needed  - Consider post-discharge preferences of patient/family/discharge partner  - Complete POLST form as appropriate  - Assess patient's ability to be responsible for managing their own health  - Refer to Case Management Department for coordinating discharge planning if the patient needs post-hospital services based on physician/LIP order or complex needs related to functional status, cognitive ability or social support system  Outcome: Progressing

## 2025-02-25 NOTE — CONSULTS
Palliative Care Initial Inpatient Consult    Damari Hong Patient Status:  Inpatient    1950 MRN [id number]   Location [facility] 4W/SW/SE Attending Jose Carlos Mejia MD   Hosp Day # 5 PCP Shruthi Simon MD     Date of Consult: 2025  Patient seen at: [facility]    Reason for Consultation: Consult requested for goals of care and care coordination.    Subjective     History of Present Illness: Ms. 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 History obtained mostly from sons. Patient lives with son, was recently admitted at [facility] about 1 month ago with the flu and hasn't fully recovered since. Mostly bed/wheelchair bound since admission, able to transfer from bed to chair with stand and pivot. Normally has some issues with word finding and slow sleech. Son helped patient go to the bathroom at 2AM and got her back into bed then checked on her in the morning at 7AM and found her on the floor with some dried blood on her lips. Thinks she slid out of bed sometime in the night. He called EMS who was able to get her back to bed and do an assessment. Had normal vitals, no obvious fractures or significant bruising, offered to take her to the ER but she also had an oncologist appt that morning so they planned to go to that instead. While she was getting blood drawn from her port, she became unresponsive. Son felt like she was having a seizure however no seizure like activity noted but she was in and out of consciousness. EMS called and brought her here. Son notes that her speech is a little slower than usual and she is having more word finding difficulty than normal. Normally has some double vision. Has had generlized weakness since last admission but no unilateral weakness that family was aware  of.  MRI showing progressive brain mets. Our palliative care service was asked to evaluate the patient for a goals of care discussion and symptom management.      Review of Systems: Palliative Care symptom needs assessed:     Unable to accurately obtain due to altered mental status     Palliative Care Social History:   Marital Status:    Children: Yes  Living Situation Prior to Admit: Home  Occupational History: Retired  Personal:     Spiritual  Damari XIONG Gely DANIEL Oliver requested: No    Medical History: obtained from [facility]  Past Medical History:    Depression    off Cymbalta    Esophageal reflux    HYPERLIPIDEMIA    Lung cancer (HCC)    with brain mets    OSTEOARTHRITIS    OTHER DISEASES    Hashimoto's--sees Dr. Palmer--Endo    OTHER DISEASES    DEXA-WNL 2008    Reflux    Thyroid disease     Past Surgical History:   Procedure Laterality Date    Appendectomy      Colonoscopy      2008-Dr. Rodriges--colonoscopy-normal--next 2018    Hysterectomy      partial hysterectomy-still with bilateral ovaries--due to proloapsed uterus--PAP's always normal    Knee replacement surgery      L-knee TKP-Dr. Carney 12/08'    Laparoscopic  04/24/2014    Procedure: LAPAROSCOPIC CHOLECYSTECTOMY WITH  CHOLANGIOGRAM   POSS OPEN;  Surgeon: Monik Simon MD;  [facility]    Other surgical history      Arthroscopy    Other surgical history      both knee replacement    Pleurx catheter         Family History: obtained from [facility]  Family History   Problem Relation Age of Onset    Cancer Father         EtOH, Liver Dz    Cancer Mother         colon cancer and Alzheimer;s-diagnosed 68       Allergies:  Allergies[1]    Medications:     Current Facility-Administered Medications:     potassium chloride 40 mEq in 250mL sodium chloride 0.9% IVPB premix, 40 mEq, Intravenous, Once    apixaban (Eliquis) tab 5 mg, 5 mg, Oral, BID    acetaminophen (Tylenol) tab 650 mg, 650 mg, Oral, Q4H PRN **OR** acetaminophen (Tylenol) rectal  suppository 650 mg, 650 mg, Rectal, Q4H PRN    labetalol (Trandate) 5 mg/mL injection 10 mg, 10 mg, Intravenous, Q10 Min PRN    hydrALAzine (Apresoline) 20 mg/mL injection 10 mg, 10 mg, Intravenous, Q2H PRN    famotidine (Pepcid) tab 20 mg, 20 mg, Oral, Daily **OR** famotidine (Pepcid) 20 mg/2mL injection 20 mg, 20 mg, Intravenous, Daily    ondansetron (Zofran) 4 MG/2ML injection 4 mg, 4 mg, Intravenous, Q6H PRN **OR** metoclopramide (Reglan) 5 mg/mL injection 10 mg, 10 mg, Intravenous, Q8H PRN    albuterol (Ventolin HFA) 108 (90 Base) MCG/ACT inhaler 1 puff, 1 puff, Inhalation, Q6H PRN    predniSONE (Deltasone) tab 10 mg, 10 mg, Oral, Daily    levothyroxine (Synthroid) tab 50 mcg, 50 mcg, Oral, Daily    levETIRAcetam (Keppra) 500 mg/5mL injection 500 mg, 500 mg, Intravenous, Q12H  No current outpatient medications on file.         Palliative Performance Scale: 10 %  % Ambulation Activity Level Self-Care Intake Consciousness   100 Full  Normal  No Disease Full Normal Full   90 Full  Normal  Some Disease Full Normal Full   80 Full  Normal w/effort  Some Disease Full Normal or reduced Full   70 Reduced  Can't Perform Job  Some Disease Full Normal or reduced Full   60 Reduced  Can't Perform Hobby   Significant Disease Occ Assist Normal or reduced Full or confused   50 Mainly sit/lie Can't do any work  Extensive Disease Partial Assist Normal or reduced Full or confused   40 Mainly in bed Can't do any work  Extensive Disease Mainly Assist Normal or reduced Full or confused   30 Bed Bound Can't do any work  Extensive Disease Max Assist  Total Care Reduced  Drowsy/confused   20 Bed Bound Can't do any work  Extensive Disease Max Assist  Total Care Minimal  Drowsy/confused   10 Bed Bound Can't do any work  Extensive Disease Max Assist  Total Care Mouth Care  Drowsy/confused   0 Death        Objective      Vital Signs:  Blood pressure 111/75, pulse 110, temperature 97.4 °F (36.3 °C), temperature source Axillary, resp. rate  19, height 5' 3\" (1.6 m), weight 128 lb (58.1 kg), SpO2 (!) 86%, not currently breastfeeding.  Body mass index is 22.67 kg/m².  Non-verbal signs of pain present: Yes    Physical Exam:  General: Somnolent, mild apparent respiratory distress.  HEENT: MMM.   Cardiac: RRR  Lungs: Normal effort on RA  Abdomen: Soft, non-distended  Extremities:  no  Edema present  Skin: Warm and dry.    Hematology:  Lab Results   Component Value Date    WBC 4.7 02/25/2025    HGB 10.7 (L) 02/25/2025    HCT 30.6 (L) 02/25/2025    .0 (L) 02/25/2025       Coags:  Lab Results   Component Value Date    INR 1.0 09/21/2010       Chemistry:  Lab Results   Component Value Date    CREATSERUM 0.55 02/25/2025    BUN 12 02/25/2025     02/25/2025    K 3.6 02/25/2025    K 3.6 02/25/2025     02/25/2025    CO2 23.0 02/25/2025    GLU 97 02/25/2025    CA 7.8 (L) 02/25/2025    ALB 4.8 03/23/2022    ALKPHO 106 03/23/2022    BILT 0.52 03/23/2022    TP 7.4 03/23/2022    AST 22 03/23/2022    ALT 19 03/23/2022       Imaging:  No results found.    Assessment and Recommendations        Delirium, acute    Dehydration    Primary malignant neoplasm of lung metastatic to other site, unspecified laterality (HCC)    Intracranial hemorrhage (HCC)    Acute hemorrhagic infarction of brain (HCC)    Symptom Management      Pain:  -has not traditionally had pain  -there is some brow-furrowing, indicating discomfort  -would have low dose dilaudid 0.2mg IV q2-3hr prn available    Dyspnea:  -on room air now, previously on home 02    Cachexia:  -previously on megace  -has had severely diminished caloric intake over last several weeks, sons estimate maybe 2-300 calories  -will stop remeron, for drowsiness    Somnolence  -will stop prednisone in favor of decadron  -4mg IV TID  -add provigil 100mg po qam     Prevention of Constipation:    - Recommend Dulcolax suppository daily PRN    2.     Advanced Care Planning  A voluntarily discussion and explanation regarding  Advance Care Planning (ACP) took place today with patient and son. We discussed the risks vs benefits of life sustaining treatments in the setting of Damari Hong's comorbid medical conditions. Items addressed: patient preferences , code status , end-of-life care plan, and general prognosis . This resulted in a better understanding of ACP documentation , a better understanding of pt's expressed wishes/preferences , confirmation of code status , and family discussions to continue privately .   Summary:     Total time dedicated to ACP >16 minutes.       3.     Serious Illness Conversation:   Code Status: DNAR  Met with Damari and sons Romario and Brayan. Unfortunately at the time of my meeting she appears obtunded and minimally arousable. She will nod and voice answers to simple questions but doesn't open her eyes.   I spoke with her outside oncologist Dr. Monzon who is in contact with her sons. We will plan to try to optimize her performance, however if her mental status does not improve, we may plan to pivot to comfort care/hospice.    Palliative Care Follow Up: Palliative care team will Continue to follow while inpatient    Thank you for allowing Palliative Care services to participate in the care of Damari Hong.    A total of 70 minutes were spent on this visit, which included all of the following: direct face to face contact, history taking, physical examination, and >50% was spent counseling and coordinating care.    Richard Fox MD  2/25/2025  12:48 PM  Palliative Care Services  [facility] [phone]    Note to patient:  The 21st Century Cures Act makes medical notes like these available to patients in the interest of transparency. However, be advised this is a medical document. It is intended as peer to peer communication. It is written in medical language and may contain abbreviations or verbiage that are unfamiliar. It may appear blunt or direct. Medical documents are intended to carry  relevant information, facts as evident, and the clinical opinion of the practitioner.           [1] No Known Allergies

## 2025-02-25 NOTE — CM/SW NOTE
Cm met with son Romario and Dr Mejia  at bedside. Omero Schmidt is currently touring avail SNFs    Cm explained the need for 1 person of contact within the family to maximize communication and decision making. Romario stated that will be Brayan.    Romario tyrone additional ref be sent including [phone] area. CM informed son that since pt is medically cleared (per MD) ref can be sent but, no time to tour each facility. Will need to do virtual tours if avail and talk to liaisons to make choice today to allow for ins auth to process. Romario malhotra CM sent additional ref & req DSC to check status of auth submitted yesterday, PAC pending    Auth is pending    1430  New [facility] list provided. Son sts that MD has given them \"bad news\" and pt might not need [facility]AR. CM expressed sympathy    Plan  [facility] pending choice and auth    / to remain available for support and/or discharge planning.     Parris Vergara, RN    Ext [phone]

## 2025-02-26 LAB
ANION GAP SERPL CALC-SCNC: 10 MMOL/L (ref 0–18)
BASOPHILS # BLD AUTO: 0 X10(3) UL (ref 0–0.2)
BASOPHILS NFR BLD AUTO: 0 %
BUN BLD-MCNC: 16 MG/DL (ref 9–23)
BUN/CREAT SERPL: 29.1 (ref 10–20)
CALCIUM BLD-MCNC: 8.1 MG/DL (ref 8.7–10.4)
CHLORIDE SERPL-SCNC: 110 MMOL/L (ref 98–112)
CO2 SERPL-SCNC: 22 MMOL/L (ref 21–32)
CREAT BLD-MCNC: 0.55 MG/DL
DEPRECATED RDW RBC AUTO: 45.3 FL (ref 35.1–46.3)
EGFRCR SERPLBLD CKD-EPI 2021: 96 ML/MIN/1.73M2 (ref 60–?)
EOSINOPHIL # BLD AUTO: 0 X10(3) UL (ref 0–0.7)
EOSINOPHIL NFR BLD AUTO: 0 %
ERYTHROCYTE [DISTWIDTH] IN BLOOD BY AUTOMATED COUNT: 12.8 % (ref 11–15)
GLUCOSE BLD-MCNC: 147 MG/DL (ref 70–99)
HCT VFR BLD AUTO: 30.4 %
HGB BLD-MCNC: 11 G/DL
IMM GRANULOCYTES # BLD AUTO: 0.04 X10(3) UL (ref 0–1)
IMM GRANULOCYTES NFR BLD: 0.9 %
LYMPHOCYTES # BLD AUTO: 0.48 X10(3) UL (ref 1–4)
LYMPHOCYTES NFR BLD AUTO: 10.4 %
MAGNESIUM SERPL-MCNC: 2 MG/DL (ref 1.6–2.6)
MCH RBC QN AUTO: 35.6 PG (ref 26–34)
MCHC RBC AUTO-ENTMCNC: 36.2 G/DL (ref 31–37)
MCV RBC AUTO: 98.4 FL
MONOCYTES # BLD AUTO: 0.25 X10(3) UL (ref 0.1–1)
MONOCYTES NFR BLD AUTO: 5.4 %
NEUTROPHILS # BLD AUTO: 3.83 X10 (3) UL (ref 1.5–7.7)
NEUTROPHILS # BLD AUTO: 3.83 X10(3) UL (ref 1.5–7.7)
NEUTROPHILS NFR BLD AUTO: 83.3 %
OSMOLALITY SERPL CALC.SUM OF ELEC: 298 MOSM/KG (ref 275–295)
PLATELET # BLD AUTO: 160 10(3)UL (ref 150–450)
POTASSIUM SERPL-SCNC: 4.3 MMOL/L (ref 3.5–5.1)
POTASSIUM SERPL-SCNC: 4.3 MMOL/L (ref 3.5–5.1)
RBC # BLD AUTO: 3.09 X10(6)UL
SODIUM SERPL-SCNC: 142 MMOL/L (ref 136–145)
WBC # BLD AUTO: 4.6 X10(3) UL (ref 4–11)

## 2025-02-26 PROCEDURE — 99233 SBSQ HOSP IP/OBS HIGH 50: CPT | Performed by: INTERNAL MEDICINE

## 2025-02-26 NOTE — PLAN OF CARE
Patient lethargic, alert x2 overnight. On RA. Rolls side to side, L side weakness. Saline locked. Purewick in place. No bm overnight. Takes meds crushed in pudding. Denies pain or nausea. Tolerating honey thick diet, but with poor appetite. Aspiration precautions followed. Seizure precautions in place. No calls from tele. Pleurx drain in place, dressing clean/dry/intact. Family at bedside overnight. Frequent rounding provided. Call light in reach.         Problem: Patient Centered Care  Goal: Patient preferences are identified and integrated in the patient's plan of care  Description: Interventions:  - What would you like us to know as we care for you? \"My family helps take care of me.\"  - Provide timely, complete, and accurate information to patient/family  - Incorporate patient and family knowledge, values, beliefs, and cultural backgrounds into the planning and delivery of care  - Encourage patient/family to participate in care and decision-making at the level they choose  - Honor patient and family perspectives and choices  Outcome: Progressing     Problem: Patient/Family Goals  Goal: Patient/Family Long Term Goal  Description: Patient's Long Term Goal: Feel better    Interventions:  - Educate on taking medications as prescribed  - Monitor vitals  -Up as tolerated  - See additional Care Plan goals for specific interventions  Outcome: Progressing  Goal: Patient/Family Short Term Goal  Description: Patient's Short Term Goal: No syncopal episodes    Interventions:   - EKG  - EEG  - Neurology consult  - Monitor vitals  - MRI  - See additional Care Plan goals for specific interventions  Outcome: Progressing     Problem: NEUROLOGICAL - ADULT  Goal: Achieves stable or improved neurological status  Description: INTERVENTIONS  - Assess for and report changes in neurological status  - Initiate measures to prevent increased intracranial pressure  - Maintain blood pressure and fluid volume within ordered parameters to  optimize cerebral perfusion and minimize risk of hemorrhage  - Monitor temperature, glucose, and sodium. Initiate appropriate interventions as ordered  Outcome: Progressing  Goal: Absence of seizures  Description: INTERVENTIONS  - Monitor for seizure activity  - Administer anti-seizure medications as ordered  - Monitor neurological status  Outcome: Progressing  Goal: Remains free of injury related to seizure activity  Description: INTERVENTIONS:  - Maintain airway, patient safety  and administer oxygen as ordered  - Monitor patient for seizure activity, document and report duration and description of seizure to MD/LIP  - If seizure occurs, turn patient to side and suction secretions as needed  - Reorient patient post seizure  - Seizure pads on all 4 side rails  - Instruct patient/family to notify RN of any seizure activity  - Instruct patient/family to call for assistance with activity based on assessment  Outcome: Progressing  Goal: Achieves maximal functionality and self care  Description: INTERVENTIONS  - Monitor swallowing and airway patency with patient fatigue and changes in neurological status  - Encourage and assist patient to increase activity and self care with guidance from PT/OT  - Encourage visually impaired, hearing impaired and aphasic patients to use assistive/communication devices  Outcome: Progressing     Problem: Impaired Cognition  Goal: Patient will exhibit improved attention, thought processing and/or memory  Description: Interventions:  Outcome: Progressing     Problem: PAIN - ADULT  Goal: Verbalizes/displays adequate comfort level or patient's stated pain goal  Description: INTERVENTIONS:  - Encourage pt to monitor pain and request assistance  - Assess pain using appropriate pain scale  - Administer analgesics based on type and severity of pain and evaluate response  - Implement non-pharmacological measures as appropriate and evaluate response  - Consider cultural and social influences on  pain and pain management  - Manage/alleviate anxiety  - Utilize distraction and/or relaxation techniques  - Monitor for opioid side effects  - Notify MD/LIP if interventions unsuccessful or patient reports new pain  - Anticipate increased pain with activity and pre-medicate as appropriate  Outcome: Progressing     Problem: SAFETY ADULT - FALL  Goal: Free from fall injury  Description: INTERVENTIONS:  - Assess pt frequently for physical needs  - Identify cognitive and physical deficits and behaviors that affect risk of falls.  - [place] fall precautions as indicated by assessment.  - Educate pt/family on patient safety including physical limitations  - Instruct pt to call for assistance with activity based on assessment  - Modify environment to reduce risk of injury  - Provide assistive devices as appropriate  - Consider OT/PT consult to assist with strengthening/mobility  - Encourage toileting schedule  Outcome: Progressing     Problem: DISCHARGE PLANNING  Goal: Discharge to home or other facility with appropriate resources  Description: INTERVENTIONS:  - Identify barriers to discharge w/pt and caregiver  - Include patient/family/discharge partner in discharge planning  - Arrange for needed discharge resources and transportation as appropriate  - Identify discharge learning needs (meds, wound care, etc)  - Arrange for interpreters to assist at discharge as needed  - Consider post-discharge preferences of patient/family/discharge partner  - Complete POLST form as appropriate  - Assess patient's ability to be responsible for managing their own health  - Refer to Case Management Department for coordinating discharge planning if the patient needs post-hospital services based on physician/LIP order or complex needs related to functional status, cognitive ability or social support system  Outcome: Progressing

## 2025-02-26 NOTE — PROGRESS NOTES
[facility] Hospitalist Progress Note     CC: Hospital Follow up    PCP: Shruthi Simon MD       Assessment/Plan:     Principal Problem:    Delirium, acute  Active Problems:    Dehydration    Primary malignant neoplasm of lung metastatic to other site, unspecified laterality (HCC)    Intracranial hemorrhage (HCC)    Acute hemorrhagic infarction of brain (HCC)    Palliative care by specialist      Ms. Hong is a 73 yo female with PMH of metastatic lung cancer with brain mets, DVT/PE diagnosed 12/28/24 on Eliquis BID, hypothyroidism who presented with syncopal episode while waiting for outpatient appt and recent fall at home with head trauma, found to have new right brain stem metastatic lesion,       L sided weakness  New right paramedian midbrain metastatic lesion  Syncope  - noted on CT head  - hold Eliquis, ok to restart Eliquis per per NSGY  - MRI brain without bleeding noted  - NSGY and neurology consulted  - continue keppra  - stop neurochecks  - PT/OT/SW  - per neurology, 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 Monzon from [facility] he is awaiting approval of an oral immunotherapy that may have some benefit, but not curative, he agreed that [facility] would be best and that immunotherapy (oral medication) once approved could be initiated at [facility]  -palliative care eval  -trial of decadron and provigil for 1-2 days if improved then plan for transfer to [facility] otherwise may need hospice    Lung Ca with recurrent malignant pleural effusion  PE hx   Small R PTX - POA   Chronic hypoxic respiratory failure on home O2  - S/p R sided PleurX placement 1/31/25  - drain scheduled MWF 9:30AM, up to 1L  - Already has 2L O2 at home - at baseline now     Lung cancer with brain mets  - follow-up oncology as outpatient  - planned to start chemotherapy this week    - per family, had recent MRI brain a few weeks ago that showed decrease in size of  brain mets  - has been on prednisone taper since November, currently on 10 mg daily-> now on decadron    DVT/PE  - restart Eliquis   - 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    Hypothyroidism  - Continue synthroid     ACP  - CODE- DNR/full- confirmed with POA  - POA- son- updated at bedside   - lives at home with son     FN:  - IVF: none  - Diet: regular     DVT Prophy: SCD, eliquis   Lines: port     Dispo: needs [facility]    Discussed with Son at bedside     Outpatient records or previous hospital records reviewed.      Further recommendations pending patient's clinical course.  [facility] hospitalist to continue to follow patient while in house     Patient and/or patient's family given opportunity to ask questions and note understanding and agreeing with therapeutic plan as outlined     Jose Carlos Mejia MD  [facility] Hospitalist  Answering Service number: [phone]     Subjective:     More alert this morning, following commands, sitting up in bed    OBJECTIVE:    Blood pressure 103/83, pulse 99, temperature 97.5 °F (36.4 °C), temperature source Axillary, resp. rate 16, height 5' 3\" (1.6 m), weight 128 lb (58.1 kg), SpO2 96%, not currently breastfeeding.    Temp:  [97.4 °F (36.3 °C)-97.6 °F (36.4 °C)] 97.5 °F (36.4 °C)  Pulse:  [] 99  Resp:  [16-19] 16  BP: (103-123)/(75-88) 103/83  SpO2:  [86 %-96 %] 96 %      Intake/Output:    Intake/Output Summary (Last 24 hours) at 2/26/2025 0945  Last data filed at 2/26/2025 0600  Gross per 24 hour   Intake --   Output 325 ml   Net -325 ml       Last 3 Weights   02/25/25 0507 128 lb (58.1 kg)   02/21/25 1200 126 lb 8.7 oz (57.4 kg)   02/20/25 2050 128 lb 1.4 oz (58.1 kg)   02/20/25 1146 162 lb 0.6 oz (73.5 kg)   03/23/22 0942 162 lb (73.5 kg)   01/12/22 1248 160 lb (72.6 kg)       Exam  GEN: female in NAD,    HEENT: NC   Pulm: CTAB, no crackles or wheezes  CV: RRR, no murmurs  ABD: Soft, non-tender, non-distended, +BS  Neuro: weakness as above,  left side weaker than right,          Data Review:       Labs:     Recent Labs   Lab 02/20/25  1136 02/21/25  0346 02/24/25  0403 02/25/25  0445 02/26/25  0438   RBC 3.55*   < > 3.00* 3.13* 3.09*   HGB 12.2   < > 10.3* 10.7* 11.0*   HCT 35.7   < > 30.3* 30.6* 30.4*   .6*   < > 101.0* 97.8 98.4   MCH 34.4*   < > 34.3* 34.2* 35.6*   MCHC 34.2   < > 34.0 35.0 36.2   RDW 13.0   < > 13.2 13.0 12.8   NEPRELIM 6.59  --   --   --  3.83   WBC 7.7   < > 4.5 4.7 4.6   .0*   < > 127.0* 136.0* 160.0    < > = values in this interval not displayed.         Recent Labs   Lab 02/24/25  0403 02/25/25 0445 02/26/25  0438   GLU 93 97 147*   BUN 10 12 16   CREATSERUM 0.55 0.55 0.55   EGFRCR 96 96 96   CA 7.8* 7.8* 8.1*    142 142   K 3.6  3.6 3.6  3.6 4.3  4.3    109 110   CO2 22.0 23.0 22.0       No results for input(s): \"ALT\", \"AST\", \"ALB\", \"AMYLASE\", \"LIPASE\", \"LDH\" in the last 168 hours.    Invalid input(s): \"ALPHOS\", \"TBIL\", \"DBIL\", \"TPROT\"      Imaging:  No results found.      Meds:     INPATIENT MEDICATIONS    Scheduled Medications:      dexamethasone, 4 mg, TID  modafinil, 100 mg, Daily  apixaban, 5 mg, BID  famotidine, 20 mg, Daily   Or  famotidine, 20 mg, Daily  levothyroxine, 50 mcg, Daily  levETIRAcetam, 500 mg, Q12H            Drips:         PRN Medications  acetaminophen, 650 mg, Q4H PRN   Or  acetaminophen, 650 mg, Q4H PRN  labetalol, 10 mg, Q10 Min PRN  hydrALAzine, 10 mg, Q2H PRN  ondansetron, 4 mg, Q6H PRN   Or  metoclopramide, 10 mg, Q8H PRN  albuterol, 1 puff, Q6H PRN

## 2025-02-26 NOTE — CM/SW NOTE
Department  asked to send updates to [facility] referral for [facility]. [id number]    Clin updts sent via portal    Assigned SW/CM to follow up with patient/family on discharge plan.     Erin Khan, DSC

## 2025-02-26 NOTE — PROGRESS NOTES
Palliative Care Progress Note    Damari Jonesnon Patient Status:  Inpatient    1950 MRN [id number]   Location [facility] 4W/SW/SE Attending Jose Carlos Mejia MD   Hosp Day # 6 PCP Shruthi Simon MD     Date of Consult: 2025  Patient seen at: [facility] Inpatient    Reason for Consultation: Consult requested for goals of care and care coordination.    Subjective     S: Feeling better, more alert.     Review of Systems: Palliative Care symptom needs assessed:     Unable to accurately obtain due to altered mental status     Palliative Care Social History:   Marital Status:    Children: Yes  Living Situation Prior to Admit: Home  Occupational History: Retired  Personal:     Spiritual  Damari Hong NA    requested: No    Medical History: obtained from [facility]  Past Medical History:    Depression    off Cymbalta    Esophageal reflux    HYPERLIPIDEMIA    Lung cancer (HCC)    with brain mets    OSTEOARTHRITIS    OTHER DISEASES    Hashimoto's--sees Dr. Palmer--Endo    OTHER DISEASES    DEXA-WNL     Reflux    Thyroid disease     Past Surgical History:   Procedure Laterality Date    Appendectomy      Colonoscopy      -Dr. Rodriges--colonoscopy-normal--next     Hysterectomy      partial hysterectomy-still with bilateral ovaries--due to proloapsed uterus--PAP's always normal    Knee replacement surgery      L-knee TKP-Dr. Carney '    Laparoscopic  2014    Procedure: LAPAROSCOPIC CHOLECYSTECTOMY WITH  CHOLANGIOGRAM   POSS OPEN;  Surgeon: Monik Simon MD;  Location: [facility]    Other surgical history      Arthroscopy    Other surgical history      both knee replacement    Pleurx catheter         Family History: obtained from [facility]  Family History   Problem Relation Age of Onset    Cancer Father         EtOH, Liver Dz    Cancer Mother         colon cancer and Alzheimer;s-diagnosed 68       Allergies:  Allergies[1]    Medications:     Current  Facility-Administered Medications:     dexamethasone (Decadron) 4 MG/ML injection 4 mg, 4 mg, Intravenous, TID    modafinil (Provigil) tab 100 mg, 100 mg, Oral, Daily    apixaban (Eliquis) tab 5 mg, 5 mg, Oral, BID    acetaminophen (Tylenol) tab 650 mg, 650 mg, Oral, Q4H PRN **OR** acetaminophen (Tylenol) rectal suppository 650 mg, 650 mg, Rectal, Q4H PRN    labetalol (Trandate) 5 mg/mL injection 10 mg, 10 mg, Intravenous, Q10 Min PRN    hydrALAzine (Apresoline) 20 mg/mL injection 10 mg, 10 mg, Intravenous, Q2H PRN    famotidine (Pepcid) tab 20 mg, 20 mg, Oral, Daily **OR** famotidine (Pepcid) 20 mg/2mL injection 20 mg, 20 mg, Intravenous, Daily    ondansetron (Zofran) 4 MG/2ML injection 4 mg, 4 mg, Intravenous, Q6H PRN **OR** metoclopramide (Reglan) 5 mg/mL injection 10 mg, 10 mg, Intravenous, Q8H PRN    albuterol (Ventolin HFA) 108 (90 Base) MCG/ACT inhaler 1 puff, 1 puff, Inhalation, Q6H PRN    levothyroxine (Synthroid) tab 50 mcg, 50 mcg, Oral, Daily    levETIRAcetam (Keppra) 500 mg/5mL injection 500 mg, 500 mg, Intravenous, Q12H  No current outpatient medications on file.         Palliative Performance Scale: 10 %  % Ambulation Activity Level Self-Care Intake Consciousness   100 Full  Normal  No Disease Full Normal Full   90 Full  Normal  Some Disease Full Normal Full   80 Full  Normal w/effort  Some Disease Full Normal or reduced Full   70 Reduced  Can't Perform Job  Some Disease Full Normal or reduced Full   60 Reduced  Can't Perform Hobby   Significant Disease Occ Assist Normal or reduced Full or confused   50 Mainly sit/lie Can't do any work  Extensive Disease Partial Assist Normal or reduced Full or confused   40 Mainly in bed Can't do any work  Extensive Disease Mainly Assist Normal or reduced Full or confused   30 Bed Bound Can't do any work  Extensive Disease Max Assist  Total Care Reduced  Drowsy/confused   20 Bed Bound Can't do any work  Extensive Disease Max Assist  Total Care Minimal   Drowsy/confused   10 Bed Bound Can't do any work  Extensive Disease Max Assist  Total Care Mouth Care  Drowsy/confused   0 Death        Objective      Vital Signs:  Blood pressure 103/83, pulse 99, temperature 97.5 °F (36.4 °C), temperature source Axillary, resp. rate 16, height 5' 3\" (1.6 m), weight 128 lb (58.1 kg), SpO2 96%, not currently breastfeeding.  Body mass index is 22.67 kg/m².  Non-verbal signs of pain present: Yes    Physical Exam:  General: Somnolent, comfortable  HEENT: MMM.   Cardiac: RRR  Lungs: Normal effort on RA  Abdomen: Soft, non-distended  Extremities:  no  Edema present  Skin: Warm and dry.    Hematology:  Lab Results   Component Value Date    WBC 4.6 02/26/2025    HGB 11.0 (L) 02/26/2025    HCT 30.4 (L) 02/26/2025    .0 02/26/2025       Coags:  Lab Results   Component Value Date    INR 1.0 09/21/2010       Chemistry:  Lab Results   Component Value Date    CREATSERUM 0.55 02/26/2025    BUN 16 02/26/2025     02/26/2025    K 4.3 02/26/2025    K 4.3 02/26/2025     02/26/2025    CO2 22.0 02/26/2025     (H) 02/26/2025    CA 8.1 (L) 02/26/2025    ALB 4.8 03/23/2022    ALKPHO 106 03/23/2022    BILT 0.52 03/23/2022    TP 7.4 03/23/2022    AST 22 03/23/2022    ALT 19 03/23/2022    MG 2.0 02/26/2025       Imaging:  No results found.    Assessment and Recommendations        Delirium, acute    Dehydration    Primary malignant neoplasm of lung metastatic to other site, unspecified laterality (HCC)    Intracranial hemorrhage (HCC)    Acute hemorrhagic infarction of brain (HCC)    Symptom Management      Pain:  -has not traditionally had pain  -there is some brow-furrowing, indicating discomfort  -would have low dose dilaudid 0.2mg IV q2-3hr prn available    Dyspnea:  -on room air now, previously on home 02    Cachexia:  -previously on megace  -has had severely diminished caloric intake over last several weeks, sons estimate maybe 2-300 calories  -hold remeron, for  drowsiness    Somnolence  -will stop prednisone in favor of decadron  -4mg IV TID  -cont provigil 100mg po qam     Prevention of Constipation:    - Recommend Dulcolax suppository daily PRN    2.     Advanced Care Planning  A voluntarily discussion and explanation regarding Advance Care Planning (ACP) took place today with patient and son. We discussed the risks vs benefits of life sustaining treatments in the setting of Damari Hong's comorbid medical conditions. Items addressed: patient preferences , code status , end-of-life care plan, and general prognosis . This resulted in a better understanding of ACP documentation , a better understanding of pt's expressed wishes/preferences , confirmation of code status , and family discussions to continue privately .   Summary:     Total time dedicated to ACP >16 minutes.       3.     Serious Illness Conversation:   Code Status: DNAR  Met with Damari and sons Romario and Brayan. Unfortunately at the time of my meeting she appears obtunded and minimally arousable. She will nod and voice answers to simple questions but doesn't open her eyes.   I spoke with her outside oncologist Dr. Monzon who is in contact with her sons. We will plan to try to optimize her performance, however if her mental status does not improve, we may plan to pivot to comfort care/hospice.    Palliative Care Follow Up: Palliative care team will Continue to follow while inpatient    Thank you for allowing Palliative Care services to participate in the care of Damari Hong.    Medical Decision Making (MDM) for Palliative Care   Problems Addressed:   Details: Cancer with brain mets  Cancer related fatigue  Malnourishment  Data Reviewed & Analyzed:  External notes reviewed from each unique source:    Results reviewed from each unique test:    Unique tests ordered:    Select all that apply:    Details:    Risk of Complications from DIagnostic Testing & Treatment:   Details: - Drug therapy requiring  intensive monitoring for toxicity    - Decision regarding hospitalization or escalation of hospital level of care   - Decision not to resuscitate or to de-escalate care because of poor prognosis   - Parenteral controlled substances      Calculated MDM Level: High      Richard Fox MD  Palliative Care Services  [facility] [phone]    Note to patient:  The 21st Century Cures Act makes medical notes like these available to patients in the interest of transparency. However, be advised this is a medical document. It is intended as peer to peer communication. It is written in medical language and may contain abbreviations or verbiage that are unfamiliar. It may appear blunt or direct. Medical documents are intended to carry relevant information, facts as evident, and the clinical opinion of the practitioner.           [1] No Known Allergies

## 2025-02-26 NOTE — SLP NOTE
SPEECH DAILY NOTE - INPATIENT    ASSESSMENT & PLAN   ASSESSMENT    PPE REQUIRED. THIS THERAPIST WORE GLOVES AND DROPLET MASK FOR DURATION OF TX SESSION. HANDS WASHED UPON ENTRANCE/EXIT.     SLP f/u for ongoing meal assessment per recommendations of soft bite size solids and teaspoon amounts of moderately thick liquids per speech therapy recommendations. RN reports pt with intermittent coughing. RN approves swallowing therapy session. The pt seen at bedside and agreeable to participate in swallowing therapy. Family/caregiver present at the time of therapy.  Family reports the pt with good alertness this AM, sitting upright in bed and self feeding her breakfast with eating half of her meal on her plate. Pt denies any swallowing challenges. Pt denies any pain.  Pt prefers education via verbal explanation.     Pt positioned upright in 90 degrees in bed. Pt alert with keeping her eyes shut but answering questions.  Pt intermittently opened her eyes.  Pt is afebrile, tolerating room air with oxygen status 94% prior to the start of oral trials. SLP verbally reviewed aspiration precautions and safe swallowing compensatory strategies with the patient. Patient acknowledged understanding of swallowing precautions and family reports assisting the pt during the meal. Observed the pt with po trials of puree, soft bite size solids, and moderate thick liquids via teaspoon with intermittent overt clinical signs of aspiration of coughing. Oxygen status remained stable at 94% throughout the entire session. Oral/buccal cavities clear of residue at the end of the session. Family reports the pt cough when not eating, but coughing is increased an hour after eating.  Suspect the pt is at risk for aspiration. Unable to rule out aspiration without VFSS. Recommend to continue current diet of soft bite size solids and teaspoon amounts of moderately thick liquids with no straws.  Swallowing precautions posted in written format on white board  to assist pt with carry over.  The pt was left resting in bed with call light in reach. Family in agreement to further assess pharyngeal function with VFSS.     PLAN: Recommend further objective assessment via VFSS to assess oropharyngeal function, r/o silent aspiration, and determine the safest and least restrictive diet consistencies. Pt educated and informed of the risk/benefits of VFSS assessment. Patient v/u and gave verbal consent for objective evaluation.      Diet Recommendations - Solids: Mechanical soft chopped/ Soft & Bite Sized (Family does not want solids decreased to puree)  Diet Recommendations - Liquids: Honey thick liquids/ Moderately thick (by teaspoon)    Compensatory Strategies Recommended: Liquids via spoon;Multiple swallows;Alternate consistencies (Feed onlyl when awake)  Aspiration Precautions: Upright position;Slow rate;Small bites;No straw;1:1 feeding (1/2 teaspoon amounts)  Medication Administration Recommendations: Crushed in puree (1/2 teaspoon amounts)    Patient Experiencing Pain: No                Treatment Plan  Treatment Plan/Recommendations: Videofluoroscopic swallow study    Interdisciplinary Communication: Discussed with RN  Plan posted at bedside  Recommendations posted at bedside            GOALS  Goal #1 The patient will tolerate soft bite size consistency and teaspoon amounts of moderately thick liquids without overt signs or symptoms of aspiration with 100 % accuracy over '2-3 session(s).    Pt with intermittent overt clinical signs of aspiration on all consistencies.  Recommend VFSS to further assess pharyngeal function.  In Progress   Goal #2 The patient/family/caregiver will demonstrate understanding and implementation of aspiration precautions and swallow strategies independently over 2 session(s).    Education provided on strict swallowing precautions to pt and family.  In Progress   Goal #3 The patient will utilize compensatory strategies as outlined by  BSSE (clinical  evaluation) including Slow rate, Small bites, Multiple swallows, Alternate liquids/solids, No straws, Upright 90 degrees, Liquids via tsp amount only, 1/2 tsp amounts, Feed patient, feed pt only when alert with mod assistance 95 % of the time across 2 sessions.    The family reports waiting for the pt to be alert for meals.  In Progress   Goal #4 The patient will tolerate trial upgrade of soft solids consistency and mildly thick liquids without overt signs or symptoms of aspiration with 100 % accuracy over 2 session(s).    No diet advancement at this time.  In Progress     FOLLOW UP  Follow Up Needed (Documentation Required): Yes  SLP Follow-up Date: 02/27/25  Duration: 1 week    Session: 4 post VFSS    If you have any questions, please contact     Barbi Boyle MS/CCC-SLP  Speech Language Pathologist  [facility]  EXT. [phone]

## 2025-02-27 PROCEDURE — 99233 SBSQ HOSP IP/OBS HIGH 50: CPT | Performed by: INTERNAL MEDICINE

## 2025-02-27 RX ORDER — MIRTAZAPINE 7.5 MG/1
7.5 TABLET, FILM COATED ORAL NIGHTLY
Status: DISCONTINUED | OUTPATIENT
Start: 2025-02-28 | End: 2025-02-28

## 2025-02-27 RX ORDER — MIRTAZAPINE 7.5 MG/1
7.5 TABLET, FILM COATED ORAL NIGHTLY
Status: DISCONTINUED | OUTPATIENT
Start: 2025-02-27 | End: 2025-02-27

## 2025-02-27 RX ORDER — LORAZEPAM 2 MG/ML
0.5 INJECTION INTRAMUSCULAR ONCE
Status: COMPLETED | OUTPATIENT
Start: 2025-02-27 | End: 2025-02-27

## 2025-02-27 RX ORDER — SODIUM CHLORIDE 9 MG/ML
INJECTION, SOLUTION INTRAVENOUS CONTINUOUS
Status: DISCONTINUED | OUTPATIENT
Start: 2025-02-27 | End: 2025-03-02

## 2025-02-27 RX ORDER — HYDROMORPHONE HYDROCHLORIDE 1 MG/ML
0.2 INJECTION, SOLUTION INTRAMUSCULAR; INTRAVENOUS; SUBCUTANEOUS
Status: DISCONTINUED | OUTPATIENT
Start: 2025-02-27 | End: 2025-02-28

## 2025-02-27 NOTE — SPIRITUAL CARE NOTE
Spiritual Care Visit Note    Patient Name: Damari Hong Date of Spiritual Care Visit: 25   : 1950 Primary Dx: Delirium, acute       Referred By: Referral From:     Spiritual Care Taxonomy:    Intended Effects: Demonstrate caring and concern    Methods: Collaborate with care team member;Offer support    Interventions: Active listening;Ask guided questions;Silent prayer    Visit Type/Summary:     - Spiritual Care: Consulted with RN prior to visit. Offered empathic listening and emotional support. Patient declined a  visit at this time. Provided information regarding how to contact Spiritual Care and left a Spiritual Care information card.  remains available as needed for follow up.     SHAYY Mukherjee CAMII   [id number]     Spiritual Care support can be requested via an Epic consult. For urgent/immediate needs, please contact the On Call  at: [place]: ext [phone]

## 2025-02-27 NOTE — PALLIATIVE CARE NOTE
Brief Palliative Care Note    Stopped by to check on Leti this evening. Family is disappointed (quite understandably) that she was unable to swallow her tagrisso pill.    They are hopeful for a better day tomorrow. She responds to dilaudid and sleeps, but did have a recurrence of the restlessness upon waking.    They are grieving and processing appropriately. I am concerned that she does not have the fortitude now to continue therapy. I will follow along and of course we can consider resuming the stimulant.     Richard Fox MD  Palliative Care Services  [facility] [phone]

## 2025-02-27 NOTE — PAYOR COMM NOTE
--------------  CONTINUED STAY REVIEW    Payor: [employer] MED[employer] ADV PPO  Subscriber #:  [id number]  Authorization Number: [id number]    Admit date: 2/20/25  Admit time:  8:41 PM    REVIEW DOCUMENTATION:  2/24/2025  Hospitalist Progress Note   Weak and tired, appetite poor, not able to get up and move much     Temp:  [97.9 °F (36.6 °C)-98.1 °F (36.7 °C)] 97.9 °F (36.6 °C)  Pulse:  [] 110  Resp:  [16] 16  BP: (122-123)/(75-91) 123/91  SpO2:  [94 %-95 %] 95 %    2/24/2025 1300      Gross per 24 hour   Intake --   Output 250 ml   Net -250 ml     Lab 02/20/25  1136 02/22/25  1312 02/23/25  0503 02/24/25  0403   RBC 3.55* 3.09* 2.84* 3.00*   HGB 12.2 10.9* 9.6* 10.3*   HCT 35.7 30.8* 28.3* 30.3*   .6* 99.7 99.6 101.0*   MCH 34.4* 35.3* 33.8 34.3*   MCHC 34.2 35.4 33.9 34.0   RDW 13.0 13.0 13.1 13.2   NEPRELIM 6.59  --   --   --    WBC 7.7 5.9 4.2 4.5   .0* 124.0* 121.0* 127.0*     Lab 02/22/25  1312 02/23/25  0503 02/24/25  0403   * 86 93   BUN 11 9 10   CREATSERUM 0.54* 0.54* 0.55   EGFRCR 96 96 96   CA 7.8* 7.4* 7.8*    144 142   K 4.2 3.4*  3.4* 3.6  3.6   * 113* 111   CO2 22.0 22.0 22.0      Assessment/Plan:     ?Possible Hemorrhagic metastasis - ruled out  L sided weakness  New right paramedian midbrain metastatic lesion  Syncope  - noted on CT head  - hold Eliquis, ok to restart Eliquis per per NSGY  - MRI brain without bleeding noted  - NSGY and neurology consulted  - continue IV keppra  - stop neurochecks  - PT/OT/SW  - per neurology, 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 Monzon from [facility] he is awaiting approval of an oral immunotherapy that may have some benefit, but not curative, he agreed that [facility] would be best and that immunotherapy (oral medication) once approved could be initiated at [facility]    Lung Ca with recurrent malignant pleural effusion  PE hx   Small R PTX -  POA   Chronic hypoxic respiratory failure on home O2  - S/p R sided PleurX placement 1/31/25  - drain scheduled MWF 9:30AM, up to 1L  - Already has 2L O2 at home - at baseline now     Lung cancer with brain mets  - follow-up oncology as outpatient  - planned to start chemotherapy this week    - per family, had recent MRI brain a few weeks ago that showed decrease in size of brain mets  - has been on prednisone taper since November, currently on 10 mg daily    DVT/PE  - restart Eliquis   - 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    Hypothyroidism  - Continue synthroid     ACP  - CODE- DNR/full- confirmed with POA  - POA- son- updated at bedside   - lives at home with son     FN:  - IVF: none  - Diet: regular     DVT Prophy: SCD, eliquis   Lines: PVI     Dispo: needs Jose Carlos Haynes MD   2/24/2025  1:51 PM     2/25/2025  Palliative Care Initial Inpatient Consult   Reason for Consultation: Consult requested for goals of care and care coordination.     Somnolent, mild apparent respiratory distress.     Blood pressure 111/75, pulse 110, temperature 97.4 °F (36.3 °C), temperature source Axillary, resp. rate 19, height 5' 3\" (1.6 m), weight 128 lb (58.1 kg), SpO2 (!) 86%,     Assessment and Recommendations         Delirium, acute    Dehydration    Primary malignant neoplasm of lung metastatic to other site, unspecified laterality (HCC)    Intracranial hemorrhage (HCC)    Acute hemorrhagic infarction of brain (HCC)     Symptom Management                  Pain:  -has not traditionally had pain  -there is some brow-furrowing, indicating discomfort  -would have low dose dilaudid 0.2mg IV q2-3hr prn available     Dyspnea:  -on room air now, previously on home 02     Cachexia:  -previously on megace  -has had severely diminished caloric intake over last several weeks, sons estimate maybe 2-300 calories  -will stop remeron, for drowsiness     Somnolence  -will stop prednisone in favor of decadron  -4mg IV TID  -add  provigil 100mg po qam                 Prevention of Constipation:                - Recommend Dulcolax suppository daily PRN     2.     Advanced Care Planning    3.     Serious Illness Conversation:   Code Status: DNAR  Met with Damari and sons Romario and Brayan. Unfortunately at the time of my meeting she appears obtunded and minimally arousable. She will nod and voice answers to simple questions but doesn't open her eyes.   I spoke with her outside oncologist Dr. Monzon who is in contact with her sons. We will plan to try to optimize her performance, however if her mental status does not improve, we may plan to pivot to comfort care/hospice.     Palliative Care Follow Up: Palliative care team will Continue to follow while inpatient    Richard Fox MD  2/25/2025  12:48 PM    2/26/2025  Hospitalist Progress Note   More alert this morning, following commands, sitting up in bed     Temp:  [97.4 °F (36.3 °C)-97.6 °F (36.4 °C)] 97.5 °F (36.4 °C)  Pulse:  [] 99  Resp:  [16-19] 16  BP: (103-123)/(75-88) 103/83  SpO2:  [86 %-96 %] 96 %    Lab 02/20/25  1136 02/24/25  0403 02/25/25 0445 02/26/25  0438   RBC 3.55* 3.00* 3.13* 3.09*   HGB 12.2 10.3* 10.7* 11.0*   HCT 35.7 30.3* 30.6* 30.4*   .6* 101.0* 97.8 98.4   MCH 34.4* 34.3* 34.2* 35.6*   MCHC 34.2 34.0 35.0 36.2   RDW 13.0 13.2 13.0 12.8   NEPRELIM 6.59  --   --  3.83   WBC 7.7 4.5 4.7 4.6   .0* 127.0* 136.0* 160.0     Lab 02/24/25  0403 02/25/25  0445 02/26/25  0438   GLU 93 97 147*   BUN 10 12 16   CREATSERUM 0.55 0.55 0.55   EGFRCR 96 96 96   CA 7.8* 7.8* 8.1*    142 142   K 3.6  3.6 3.6  3.6 4.3  4.3    109 110   CO2 22.0 23.0 22.0     Assessment/Plan:     L sided weakness  New right paramedian midbrain metastatic lesion  Syncope  - noted on CT head  - hold Eliquis, ok to restart Eliquis per per NSGY  - MRI brain without bleeding noted  - NSGY and neurology consulted  - continue keppra  - stop neurochecks  - PT/OT/SW  - per  neurology, 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 Monzon from [facility] he is awaiting approval of an oral immunotherapy that may have some benefit, but not curative, he agreed that [facility] would be best and that immunotherapy (oral medication) once approved could be initiated at [facility]  -palliative care eval  -trial of decadron and provigil for 1-2 days if improved then plan for transfer to [facility] otherwise may need hospice    Lung Ca with recurrent malignant pleural effusion  PE hx   Small R PTX - POA   Chronic hypoxic respiratory failure on home O2  - S/p R sided PleurX placement 1/31/25  - drain scheduled MWF 9:30AM, up to 1L  - Already has 2L O2 at home - at baseline now     Lung cancer with brain mets  - follow-up oncology as outpatient  - planned to start chemotherapy this week    - per family, had recent MRI brain a few weeks ago that showed decrease in size of brain mets  - has been on prednisone taper since November, currently on 10 mg daily-> now on decadron    DVT/PE  - restart Eliquis   - 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    Hypothyroidism  - Continue synthroid     ACP  - CODE- DNR/full- confirmed with POA  - POA- son- updated at bedside   - lives at home with son     FN:  - IVF: none  - Diet: regular     DVT Prophy: SCD, eliquis   Lines: port     Dispo: needs [facility]  Discussed with Son at bedside   Jose Carlos Mejia MD   2/26/2025  9:48 AM     2/27/2025  Hospitalist Progress Note   Confused and agitated, pulling at sheet, and tele and port     Temp:  [97.5 °F (36.4 °C)-98.1 °F (36.7 °C)] 98.1 °F (36.7 °C)  Pulse:  [] 86  Resp:  [16] 16  BP: (119-124)/(73-86) 119/73  SpO2:  [92 %-95 %] 95 %.    2/27/2025 0547      Gross per 24 hour   Intake 180 ml   Output 750 ml   Net -570 ml     Assessment/Plan:      L sided weakness  New right paramedian midbrain metastatic lesion  Syncope  - noted on CT head  - hold  Eliquis, ok to restart Eliquis per per NSGY  - MRI brain without bleeding noted  - NSGY and neurology consulted  - continue keppra  - stop neurochecks  - PT/OT/SW  - per neurology, 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 Monzon from [facility] he is awaiting approval of an oral immunotherapy that may have some benefit, but not curative,  -palliative care eval  -trial of decadron, was agitated today will stop provigil  -continue to monitor, more confused and lethargic     Lung Ca with recurrent malignant pleural effusion  PE hx   Small R PTX - POA   Chronic hypoxic respiratory failure on home O2  - S/p R sided PleurX placement 1/31/25  - drain scheduled MWF 9:30AM, up to 1L  - Already has 2L O2 at home - at baseline now     Lung cancer with brain mets  - follow-up oncology as outpatient  - planned to start chemotherapy this week    - per family, had recent MRI brain a few weeks ago that showed decrease in size of brain mets  - has been on prednisone taper since November, currently on 10 mg daily-> now on decadron  - tegresso was ordered, Dr. Monzon from  wants her to start this, medication has been approved will start today per Dr. Monzon with oncology     DVT/PE  - restart Eliquis   - 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    Hypothyroidism  - Continue synthroid     ACP  - CODE- DNR/full- confirmed with POA  - POA- son- updated at bedside   - lives at home with son     FN:  - IVF: none  - Diet: regular     DVT Prophy: SCD, eliquis   Lines: port     Dispo: needs [facility] Mejia MD   2/27/2025 11:24 AM     MEDICATIONS ADMINISTERED IN LAST 1 DAY:  acetaminophen (Tylenol) tab 650 mg       Date Action Dose Route User    2/27/2025 0841 Given 650 mg Oral Julieta Gaitan, RN    2/27/2025 0301 Given 650 mg Oral Gisell Angeles RN          apixaban (Eliquis) tab 5 mg       Date Action Dose Route User    2/27/2025 0841 Given  5 mg Oral Julieta Gaitan RN    2/26/2025 2132 Given 5 mg Oral Gisell Angeles RN          dexamethasone (Decadron) 4 MG/ML injection 4 mg       Date Action Dose Route User    2/27/2025 0842 Given 4 mg Intravenous Julieta Gaitan RN    2/26/2025 2132 Given 4 mg Intravenous Gisell Angeles RN          famotidine (Pepcid) tab 20 mg       Date Action Dose Route User    2/27/2025 0841 Given 20 mg Oral Julieta Gaitan RN          HYDROmorphone (Dilaudid) 1 MG/ML injection 0.2 mg       Date Action Dose Route User    2/27/2025 1200 Given 0.2 mg Intravenous Bonny Sam RN          levETIRAcetam (Keppra) 500 mg/5mL injection 500 mg       Date Action Dose Route User    2/27/2025 0842 Given 500 mg Intravenous Julieta Gaitan RN    2/26/2025 2132 Given 500 mg Intravenous Gisell Angeles RN          levothyroxine (Synthroid) tab 50 mcg       Date Action Dose Route User    2/27/2025 0841 Given 50 mcg Oral Julieta Gaitan RN          sodium chloride 0.9% infusion       Date Action Dose Route User    2/27/2025 1342 New Bag (none) Intravenous Bonny Sam RN            Vitals (last day)       Date/Time Temp Pulse Resp BP SpO2 Weight O2 Device O2 Flow Rate (L/min) [facility]    02/27/25 1400 -- 87 16 119/73 -- -- -- --     02/27/25 0400 -- 86 -- -- -- -- -- --     02/27/25 0314 98.1 °F (36.7 °C) -- 16 119/73 95 % -- None (Room air) --     02/26/25 2053 97.6 °F (36.4 °C) -- 16 120/86 92 % -- None (Room air) --     02/26/25 1900 -- 114 -- -- -- -- -- --     02/26/25 1650 -- 102 16 124/79 94 % -- None (Room air) --     02/26/25 1242 97.5 °F (36.4 °C) -- 16 119/77 94 % -- None (Room air) -- EM    02/26/25 0421 -- -- -- -- -- -- None (Room air) -- KB    02/26/25 0421 97.5 °F (36.4 °C) 99 16 103/83 96 % -- -- -- MW    02/26/25 0400 -- 96 -- -- -- -- -- --        FOR CONTINUED STAY REVIEW/APPROVAL OF INPT ADMISSION

## 2025-02-27 NOTE — PLAN OF CARE
Problem: Patient Centered Care  Goal: Patient preferences are identified and integrated in the patient's plan of care  Description: Interventions:  - What would you like us to know as we care for you? \"My family helps take care of me.\"  - Provide timely, complete, and accurate information to patient/family  - Incorporate patient and family knowledge, values, beliefs, and cultural backgrounds into the planning and delivery of care  - Encourage patient/family to participate in care and decision-making at the level they choose  - Honor patient and family perspectives and choices  Outcome: Progressing     Problem: Patient/Family Goals  Goal: Patient/Family Long Term Goal  Description: Patient's Long Term Goal: Feel better    Interventions:  - Educate on taking medications as prescribed  - Monitor vitals  -Up as tolerated  - See additional Care Plan goals for specific interventions  Outcome: Progressing  Goal: Patient/Family Short Term Goal  Description: Patient's Short Term Goal: No syncopal episodes    Interventions:   - EKG  - EEG  - Neurology consult  - Monitor vitals  - MRI  - See additional Care Plan goals for specific interventions  Outcome: Progressing     Problem: NEUROLOGICAL - ADULT  Goal: Achieves stable or improved neurological status  Description: INTERVENTIONS  - Assess for and report changes in neurological status  - Initiate measures to prevent increased intracranial pressure  - Maintain blood pressure and fluid volume within ordered parameters to optimize cerebral perfusion and minimize risk of hemorrhage  - Monitor temperature, glucose, and sodium. Initiate appropriate interventions as ordered  Outcome: Progressing  Goal: Absence of seizures  Description: INTERVENTIONS  - Monitor for seizure activity  - Administer anti-seizure medications as ordered  - Monitor neurological status  Outcome: Progressing  Goal: Remains free of injury related to seizure activity  Description: INTERVENTIONS:  - Maintain  airway, patient safety  and administer oxygen as ordered  - Monitor patient for seizure activity, document and report duration and description of seizure to MD/LIP  - If seizure occurs, turn patient to side and suction secretions as needed  - Reorient patient post seizure  - Seizure pads on all 4 side rails  - Instruct patient/family to notify RN of any seizure activity  - Instruct patient/family to call for assistance with activity based on assessment  Outcome: Progressing  Goal: Achieves maximal functionality and self care  Description: INTERVENTIONS  - Monitor swallowing and airway patency with patient fatigue and changes in neurological status  - Encourage and assist patient to increase activity and self care with guidance from PT/OT  - Encourage visually impaired, hearing impaired and aphasic patients to use assistive/communication devices  Outcome: Progressing     Problem: Impaired Cognition  Goal: Patient will exhibit improved attention, thought processing and/or memory  Description: Interventions:    Outcome: Progressing     Problem: PAIN - ADULT  Goal: Verbalizes/displays adequate comfort level or patient's stated pain goal  Description: INTERVENTIONS:  - Encourage pt to monitor pain and request assistance  - Assess pain using appropriate pain scale  - Administer analgesics based on type and severity of pain and evaluate response  - Implement non-pharmacological measures as appropriate and evaluate response  - Consider cultural and social influences on pain and pain management  - Manage/alleviate anxiety  - Utilize distraction and/or relaxation techniques  - Monitor for opioid side effects  - Notify MD/LIP if interventions unsuccessful or patient reports new pain  - Anticipate increased pain with activity and pre-medicate as appropriate  Outcome: Progressing     Problem: SAFETY ADULT - FALL  Goal: Free from fall injury  Description: INTERVENTIONS:  - Assess pt frequently for physical needs  - Identify  cognitive and physical deficits and behaviors that affect risk of falls.  - [place] fall precautions as indicated by assessment.  - Educate pt/family on patient safety including physical limitations  - Instruct pt to call for assistance with activity based on assessment  - Modify environment to reduce risk of injury  - Provide assistive devices as appropriate  - Consider OT/PT consult to assist with strengthening/mobility  - Encourage toileting schedule  Outcome: Progressing     Problem: DISCHARGE PLANNING  Goal: Discharge to home or other facility with appropriate resources  Description: INTERVENTIONS:  - Identify barriers to discharge w/pt and caregiver  - Include patient/family/discharge partner in discharge planning  - Arrange for needed discharge resources and transportation as appropriate  - Identify discharge learning needs (meds, wound care, etc)  - Arrange for interpreters to assist at discharge as needed  - Consider post-discharge preferences of patient/family/discharge partner  - Complete POLST form as appropriate  - Assess patient's ability to be responsible for managing their own health  - Refer to Case Management Department for coordinating discharge planning if the patient needs post-hospital services based on physician/LIP order or complex needs related to functional status, cognitive ability or social support system  Outcome: Progressing   A&O2-3. No acute events. Vital signs stable. Restless. Tylenol given for mild pain. Patient is maximum assist. Voids through purewick. No BM overnight.  Patient has low appetite. Takes her pills crushed with pudding. Pleurex drain intact. Fall and safety precaution in place. Daughter at the bedside.

## 2025-02-27 NOTE — CM/SW NOTE
Prior Authorization - Destination  Destination Type: Skilled nursing facility  Service Provider: *PAC  Payer Communication Destination Comments: Daiana sparrow [id number]  Prior Authorization Status: Approved  Prior Authorization Start Date: 02/26/25 2/26 to 3/3      Erin Khan DSC

## 2025-02-27 NOTE — CM/SW NOTE
CM discussed dc plan w/ MD    Pt has showed some improvement on steroids however, is now restless. Unsure if r/t disease progression vs medication.    He will continue to monitor condition to determine if pt will be appropriate for [facility] vs Hospice care    If [facility]- Ins auth is approved thru 3/3 pending facility choice. Son was provided with list at bedside on 2/25    Plan  Pending    / to remain available for support and/or discharge planning.     Parris Vergara RN    Ext [phone]

## 2025-02-27 NOTE — PHYSICAL THERAPY NOTE
PHYSICAL THERAPY TREATMENT NOTE - INPATIENT     Room Number: 462/462-A       Presenting Problem: delerium, malginant lung ca with brain mets  Co-Morbidities : lung CA with brain mets    Problem List  Principal Problem:    Delirium, acute  Active Problems:    Dehydration    Primary malignant neoplasm of lung metastatic to other site, unspecified laterality (HCC)    Intracranial hemorrhage (HCC)    Acute hemorrhagic infarction of brain (HCC)    Palliative care by specialist      PHYSICAL THERAPY ASSESSMENT   Patient demonstrates limited progress this session, goals  remain in progress.      Patient is requiring maximum assist as a result of the following impairments: decreased functional strength, decreased endurance/aerobic capacity, pain, impaired sitting / standing balance, impaired coordination, impaired motor planning, decreased muscular endurance, difficulty maintaining precautions, cognitive deficits (A/O x 1 drowsy poor ability to participate), and medical status.     Patient continues to function below baseline with bed mobility, transfers, and maintaining seated position.  Next session anticipate patient to progress bed mobility, transfers, maintaining seated position, and standing prolonged periods.  Physical Therapy will continue to follow patient for duration of hospitalization.    Patient continues to benefit from continued skilled PT services: to promote return to prior level of function and safety with continuous assistance and gradual rehabilitative therapy .    PLAN DURING HOSPITALIZATION  Nursing Mobility Recommendation : Lift Equipment  PT Device Recommendation: Rolling walker;Mechanical lift  PT Treatment Plan: Bed mobility;Body mechanics;Endurance;Energy conservation;Patient education;Range of motion;Strengthening;Transfer training;Balance training  Frequency (Obs): 3-5x/week     SUBJECTIVE  Do you want to sit up?   OK    OBJECTIVE  Precautions: Limb alert - left;Seizure;Bed/chair alarm    WEIGHT  BEARING RESTRICTION       PAIN ASSESSMENT   Rating: Unable to rate  Location: denied pain  Management Techniques: Activity promotion;Repositioning    BALANCE  Static Sitting: Poor  Dynamic Sitting: Poor  Static Standing: Not tested  Dynamic Standing: Not tested    ACTIVITY TOLERANCE  Pulse: 87  Heart Rate Source: Monitor  Resp: 16  BP: 119/73  BP Location: Right leg  BP Method: Automatic  Patient Position: Lying     O2 WALK  Oxygen Therapy  SPO2% on Room Air at Rest: 95    AM-PAC '6-Clicks' INPATIENT SHORT FORM - BASIC MOBILITY  How much difficulty does the patient currently have...  Patient Difficulty: Turning over in bed (including adjusting bedclothes, sheets and blankets)?: A Lot   Patient Difficulty: Sitting down on and standing up from a chair with arms (e.g., wheelchair, bedside commode, etc.): Unable   Patient Difficulty: Moving from lying on back to sitting on the side of the bed?: A Lot   How much help from another person does the patient currently need...   Help from Another: Moving to and from a bed to a chair (including a wheelchair)?: Total   Help from Another: Need to walk in hospital room?: Total   Help from Another: Climbing 3-5 steps with a railing?: Total     AM-PAC Score:  Raw Score: 8   Approx Degree of Impairment: 86.62%   Standardized Score (AM-PAC Scale): 28.58   CMS Modifier (G-Code): CM    FUNCTIONAL ABILITY STATUS  Functional Mobility/Gait Assessment  Gait Assistance: Not tested  Rolling: maximum assist  Supine to Sit: maximum assist  Sit to Supine: dependent  Sit to Stand: dependent    Skilled Therapy Provided: Pt presents lethargic somnolent in bed with minimal ability to respond to VC's. PT spoke with family in room to ask if they consented with PT helping pt to sit up. Family was hesitant but stated yes please try but be gentle. Pt was assisted with max a to EOB. Pt ed with sitting balance with static and dynamic sitting challenges. Pt is unable to sit in midline position without  support with mod a to maintain upright sitting posture. Pt ed with therex in sitting for LE PROM and UE AAROM x 10 reps for strength and therapeutic ROM.     Pt presents with minimal ability to participate at any level with PT treatment. Pt does intermittent respond with single word responses like ok or yes or no to questions. Functional performance is consistent with a skilled nursing level of care with assist required for all mobility, repositioning and ADL support.     Rehab potential is guarded to poor with lung CA and brain mets progressive in nature. PT will continue to follow and promote functional mobility as per pt and family wishes to improve quality of life and attempt to reduce burden of care. GR with PT, OT is recommended.     The patient's Approx Degree of Impairment: 86.62% has been calculated based on documentation in the [facility] '6 clicks' Inpatient Daily Activity Short Form.  Research supports that patients with this level of impairment may benefit from GR with PT as medical progress allows limited rehab potential / guarded prognosis.    Final disposition will be made by interdisciplinary medical team.    THERAPEUTIC EXERCISES  Lower Extremity Ankle pumps  Heel slides  LAQ     Position Sitting       Patient End of Session: Up in chair;With  staff;Needs met;RN aware of session/findings;All patient questions and concerns addressed;Alarm set;Family present    CURRENT GOALS     Goals to be met by: 3/7/25  Patient Goal Patient's self-stated goal is: not stated   Goal #1 Patient is able to demonstrate supine - sit EOB @ level: minimum assistance      Goal #1   Current Status  Max A   Goal #2 Patient is able to demonstrate transfers Sit to/from Stand at assistance level: minimum assistance with walker - rolling      Goal #2  Current Status  Max A   Goal #3 Patient is able to ambulate 10 feet with assist device: walker - rolling at assistance level: minimum assistance   Goal #3   Current Status  NT   Goal #4      Goal #4   Current Status     Goal #5 Patient to demonstrate independence with home activity/exercise instructions provided to patient in preparation for discharge.   Goal #5   Current Status  in progress     Therapeutic Activity: 15 minutes

## 2025-02-27 NOTE — PROGRESS NOTES
[facility] Hospitalist Progress Note     CC: Hospital Follow up    PCP: Shruthi Simon MD       Assessment/Plan:     Principal Problem:    Delirium, acute  Active Problems:    Dehydration    Primary malignant neoplasm of lung metastatic to other site, unspecified laterality (HCC)    Intracranial hemorrhage (HCC)    Acute hemorrhagic infarction of brain (HCC)    Palliative care by specialist      Ms. 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, hypothyroidism who presented with syncopal episode while waiting for outpatient appt and recent fall at home with head trauma, found to have new right brain stem metastatic lesion,       L sided weakness  New right paramedian midbrain metastatic lesion  Syncope  - noted on CT head  - hold Eliquis, ok to restart Eliquis per per NSGY  - MRI brain without bleeding noted  - NSGY and neurology consulted  - continue keppra  - stop neurochecks  - PT/OT/SW  - per neurology, 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 Monzon from [facility] he is awaiting approval of an oral immunotherapy that may have some benefit, but not curative,  -palliative care eval  -trial of decadron, was agitated today will stop provigil  -continue to monitor, more confused and lethargic     Lung Ca with recurrent malignant pleural effusion  PE hx   Small R PTX - POA   Chronic hypoxic respiratory failure on home O2  - S/p R sided PleurX placement 1/31/25  - drain scheduled MWF 9:30AM, up to 1L  - Already has 2L O2 at home - at baseline now     Lung cancer with brain mets  - follow-up oncology as outpatient  - planned to start chemotherapy this week    - per family, had recent MRI brain a few weeks ago that showed decrease in size of brain mets  - has been on prednisone taper since November, currently on 10 mg daily-> now on decadron  - tegresso was  ordered, Dr. Monzon from  wants her to start this, medication has been approved will start today per Dr. Monzon with oncology     DVT/PE  - restart Eliquis   - 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    Hypothyroidism  - Continue synthroid     ACP  - CODE- DNR/full- confirmed with POA  - POA- son- updated at bedside   - lives at home with son     FN:  - IVF: none  - Diet: regular     DVT Prophy: SCD, eliquis   Lines: port     Dispo: needs HSAR    Discussed with Son's at bedside     Outpatient records or previous hospital records reviewed.      Further recommendations pending patient's clinical course.  [facility] hospitalist to continue to follow patient while in house     Patient and/or patient's family given opportunity to ask questions and note understanding and agreeing with therapeutic plan as outlined     Jose Carlos Mejia MD  [facility] Hospitalist  Answering Service number: [phone]     Subjective:     Confused and agitated, pulling at sheet, and tele and port     OBJECTIVE:    Blood pressure 119/73, pulse 86, temperature 98.1 °F (36.7 °C), temperature source Axillary, resp. rate 16, height 5' 3\" (1.6 m), weight 128 lb (58.1 kg), SpO2 95%, not currently breastfeeding.    Temp:  [97.5 °F (36.4 °C)-98.1 °F (36.7 °C)] 98.1 °F (36.7 °C)  Pulse:  [] 86  Resp:  [16] 16  BP: (119-124)/(73-86) 119/73  SpO2:  [92 %-95 %] 95 %      Intake/Output:    Intake/Output Summary (Last 24 hours) at 2/27/2025 1013  Last data filed at 2/27/2025 0547  Gross per 24 hour   Intake 180 ml   Output 750 ml   Net -570 ml       Last 3 Weights   02/25/25 0507 128 lb (58.1 kg)   02/21/25 1200 126 lb 8.7 oz (57.4 kg)   02/20/25 2050 128 lb 1.4 oz (58.1 kg)   02/20/25 1146 162 lb 0.6 oz (73.5 kg)   03/23/22 0942 162 lb (73.5 kg)   01/12/22 1248 160 lb (72.6 kg)       Exam  GEN: confused   HEENT: NC   Pulm: CTAB, no crackles or wheezes  CV: RRR, no murmurs  ABD: Soft, non-tender, non-distended, +BS  Neuro: altered, not following commands          Data Review:       Labs:     Recent Labs   Lab 02/20/25  1136 02/21/25  0346 02/24/25  0403 02/25/25  0445 02/26/25  0438   RBC 3.55*   < > 3.00* 3.13* 3.09*   HGB 12.2   < > 10.3* 10.7* 11.0*   HCT 35.7   < > 30.3* 30.6* 30.4*   .6*   < > 101.0* 97.8 98.4   MCH 34.4*   < > 34.3* 34.2* 35.6*   MCHC 34.2   < > 34.0 35.0 36.2   RDW 13.0   < > 13.2 13.0 12.8   NEPRELIM 6.59  --   --   --  3.83   WBC 7.7   < > 4.5 4.7 4.6   .0*   < > 127.0* 136.0* 160.0    < > = values in this interval not displayed.         Recent Labs   Lab 02/24/25  0403 02/25/25 0445 02/26/25  0438   GLU 93 97 147*   BUN 10 12 16   CREATSERUM 0.55 0.55 0.55   EGFRCR 96 96 96   CA 7.8* 7.8* 8.1*    142 142   K 3.6  3.6 3.6  3.6 4.3  4.3    109 110   CO2 22.0 23.0 22.0       No results for input(s): \"ALT\", \"AST\", \"ALB\", \"AMYLASE\", \"LIPASE\", \"LDH\" in the last 168 hours.    Invalid input(s): \"ALPHOS\", \"TBIL\", \"DBIL\", \"TPROT\"      Imaging:  No results found.      Meds:     INPATIENT MEDICATIONS    Scheduled Medications:      mirtazapine, 7.5 mg, Nightly  dexamethasone, 4 mg, TID  apixaban, 5 mg, BID  famotidine, 20 mg, Daily   Or  famotidine, 20 mg, Daily  levothyroxine, 50 mcg, Daily  levETIRAcetam, 500 mg, Q12H            Drips:         PRN Medications  HYDROmorphone, 0.2 mg, Q3H PRN  acetaminophen, 650 mg, Q4H PRN   Or  acetaminophen, 650 mg, Q4H PRN  labetalol, 10 mg, Q10 Min PRN  hydrALAzine, 10 mg, Q2H PRN  ondansetron, 4 mg, Q6H PRN   Or  metoclopramide, 10 mg, Q8H PRN  albuterol, 1 puff, Q6H PRN

## 2025-02-27 NOTE — PROGRESS NOTES
02/27/25 1200   VISIT TYPE   SLP Inpatient Visit Type (Documentation Required) Attempted Evaluation   FOLLOW UP/PLAN   Follow Up Needed (Documentation Required) Yes   SLP Follow-up Date 02/28/25     SLP attempt to schedule VFSS for 12:00pm. Per RN, pt restless and not appropriate/safe to transport to radiology suite. SLP to follow up as pt alert/appropriate and/or as schedule allows.    Thank you.    Kourtney Lopez M.S. CCC-SLP  Speech Language Pathologist  Phone Number Ext. [phone]

## 2025-02-27 NOTE — PROGRESS NOTES
[facility] Pharmacy Note  Oral Chemotherapy Protocol  Damari Hong is a 74 year old patient who has been prescribed Tagrisso 80mg daily as a continuation from home by Dr. Mejia, who spoke with patient's oncologist, Dr. Monzon at [facility].   Medication originally prescribed by Dr. Monzon, an outside physician.  Per P&T protocol, an inpatient oncology consult is required for this medication to be continued during admission. An oncology consult was placed by the ordering provider.  The medication has been reviewed and approved for inpatient use by an oncologist at [facility].     Thank you.  Stephanie Cabral, PharmD, [facility] Extension: [phone]

## 2025-02-27 NOTE — PROGRESS NOTES
Palliative Care Progress Note    Damari Jonesnon Patient Status:  Inpatient    1950 MRN [id number]   Location [facility] 4W/SW/SE Attending Jose Carlos Mejia MD   Hosp Day # 7 PCP Shruthi Simon MD     Date of Consult: 2025  Patient seen at: [facility] Inpatient    Reason for Consultation: Consult requested for goals of care and care coordination.    Subjective     S: Feeling better, ate more ytd but some restlessness overnight and this morning. She is fidgeting with gown, leads, IV etc. Denies symptoms.      Review of Systems: Palliative Care symptom needs assessed:     Unable to accurately obtain due to altered mental status     Palliative Care Social History:   Marital Status:    Children: Yes  Living Situation Prior to Admit: Home  Occupational History: Retired  Personal:     Spiritual  Damari Hong DANIEL    requested: No    Medical History: obtained from [employer]  Past Medical History:    Depression    off Cymbalta    Esophageal reflux    HYPERLIPIDEMIA    Lung cancer (HCC)    with brain mets    OSTEOARTHRITIS    OTHER DISEASES    Hashimoto's--sees Dr. Palmer--Endo    OTHER DISEASES    DEXA-WNL     Reflux    Thyroid disease     Past Surgical History:   Procedure Laterality Date    Appendectomy      Colonoscopy      -Dr. Rodriges--colonoscopy-normal--next 2018    Hysterectomy      partial hysterectomy-still with bilateral ovaries--due to proloapsed uterus--PAP's always normal    Knee replacement surgery      L-knee TKP-Dr. Carney '    Laparoscopic  2014    Procedure: LAPAROSCOPIC CHOLECYSTECTOMY WITH  CHOLANGIOGRAM   POSS OPEN;  Surgeon: Monik Siomn MD;  Location: [facility], [facility]    Other surgical history      Arthroscopy    Other surgical history      both knee replacement    Pleurx catheter         Family History: obtained from [employer]  Family History   Problem Relation Age of Onset    Cancer Father         EtOH, Liver Dz    Cancer Mother         colon  cancer and Alzheimer;s-diagnosed 68       Allergies:  Allergies[1]    Medications:     Current Facility-Administered Medications:     mirtazapine (Remeron) tab 7.5 mg, 7.5 mg, Oral, Nightly    HYDROmorphone (Dilaudid) 1 MG/ML injection 0.2 mg, 0.2 mg, Intravenous, Q3H PRN    dexamethasone (Decadron) 4 MG/ML injection 4 mg, 4 mg, Intravenous, TID    apixaban (Eliquis) tab 5 mg, 5 mg, Oral, BID    acetaminophen (Tylenol) tab 650 mg, 650 mg, Oral, Q4H PRN **OR** acetaminophen (Tylenol) rectal suppository 650 mg, 650 mg, Rectal, Q4H PRN    labetalol (Trandate) 5 mg/mL injection 10 mg, 10 mg, Intravenous, Q10 Min PRN    hydrALAzine (Apresoline) 20 mg/mL injection 10 mg, 10 mg, Intravenous, Q2H PRN    famotidine (Pepcid) tab 20 mg, 20 mg, Oral, Daily **OR** famotidine (Pepcid) 20 mg/2mL injection 20 mg, 20 mg, Intravenous, Daily    ondansetron (Zofran) 4 MG/2ML injection 4 mg, 4 mg, Intravenous, Q6H PRN **OR** metoclopramide (Reglan) 5 mg/mL injection 10 mg, 10 mg, Intravenous, Q8H PRN    albuterol (Ventolin HFA) 108 (90 Base) MCG/ACT inhaler 1 puff, 1 puff, Inhalation, Q6H PRN    levothyroxine (Synthroid) tab 50 mcg, 50 mcg, Oral, Daily    levETIRAcetam (Keppra) 500 mg/5mL injection 500 mg, 500 mg, Intravenous, Q12H  No current outpatient medications on file.         Palliative Performance Scale: 10 %  % Ambulation Activity Level Self-Care Intake Consciousness   100 Full  Normal  No Disease Full Normal Full   90 Full  Normal  Some Disease Full Normal Full   80 Full  Normal w/effort  Some Disease Full Normal or reduced Full   70 Reduced  Can't Perform Job  Some Disease Full Normal or reduced Full   60 Reduced  Can't Perform Hobby   Significant Disease Occ Assist Normal or reduced Full or confused   50 Mainly sit/lie Can't do any work  Extensive Disease Partial Assist Normal or reduced Full or confused   40 Mainly in bed Can't do any work  Extensive Disease Mainly Assist Normal or reduced Full or confused   30 Bed Bound  Can't do any work  Extensive Disease Max Assist  Total Care Reduced  Drowsy/confused   20 Bed Bound Can't do any work  Extensive Disease Max Assist  Total Care Minimal  Drowsy/confused   10 Bed Bound Can't do any work  Extensive Disease Max Assist  Total Care Mouth Care  Drowsy/confused   0 Death        Objective      Vital Signs:  Blood pressure 119/73, pulse 86, temperature 98.1 °F (36.7 °C), temperature source Axillary, resp. rate 16, height 5' 3\" (1.6 m), weight 128 lb (58.1 kg), SpO2 95%, not currently breastfeeding.  Body mass index is 22.67 kg/m².  Non-verbal signs of pain present: Yes    Physical Exam:  General: Somnolent, comfortable  HEENT: MMM.   Cardiac: RRR  Lungs: Normal effort on RA  Abdomen: Soft, non-distended  Extremities:  no  Edema present  Skin: Warm and dry.    Hematology:  Lab Results   Component Value Date    WBC 4.6 02/26/2025    HGB 11.0 (L) 02/26/2025    HCT 30.4 (L) 02/26/2025    .0 02/26/2025       Coags:  Lab Results   Component Value Date    INR 1.0 09/21/2010       Chemistry:  Lab Results   Component Value Date    CREATSERUM 0.55 02/26/2025    BUN 16 02/26/2025     02/26/2025    K 4.3 02/26/2025    K 4.3 02/26/2025     02/26/2025    CO2 22.0 02/26/2025     (H) 02/26/2025    CA 8.1 (L) 02/26/2025    ALB 4.8 03/23/2022    ALKPHO 106 03/23/2022    BILT 0.52 03/23/2022    TP 7.4 03/23/2022    AST 22 03/23/2022    ALT 19 03/23/2022    MG 2.0 02/26/2025       Imaging:  No results found.    Assessment and Recommendations        Delirium, acute    Dehydration    Primary malignant neoplasm of lung metastatic to other site, unspecified laterality (HCC)    Intracranial hemorrhage (HCC)    Acute hemorrhagic infarction of brain (HCC)    Symptom Management      Pain:  -has not traditionally had pain  -there is some brow-furrowing, indicating discomfort  -would have low dose dilaudid 0.2mg IV q2-3hr prn available    Agitation  -resume low dose remeron, 7.5mg nightly (first  dose now)  -this might help with agitation/pruritis    Dyspnea:  -on room air now, previously on home 02    Cachexia:  -previously on megace  -has had severely diminished caloric intake over last several weeks, sons estimate maybe 2-300 calories  -resume low dose remeron    Somnolence  -will stop prednisone in favor of decadron  -4mg IV TID     Prevention of Constipation:    - Recommend Dulcolax suppository daily PRN    2.     Advanced Care Planning  A voluntarily discussion and explanation regarding Advance Care Planning (ACP) took place today with patient and son. We discussed the risks vs benefits of life sustaining treatments in the setting of Damari Hong's comorbid medical conditions. Items addressed: patient preferences , code status , end-of-life care plan, and general prognosis . This resulted in a better understanding of ACP documentation , a better understanding of pt's expressed wishes/preferences , confirmation of code status , and family discussions to continue privately .   Summary:     Total time dedicated to ACP >16 minutes.       3.     Serious Illness Conversation:   Code Status: DNAR  Met with Damari and sons Romario and Brayan. Unfortunately at the time of my meeting she appears obtunded and minimally arousable. She will nod and voice answers to simple questions but doesn't open her eyes.   I spoke with her outside oncologist Dr. Monzon who is in contact with her sons. We will plan to try to optimize her performance, however if her mental status does not improve, we may plan to pivot to comfort care/hospice.  Leit is more somnolent and restless. She is oriented upon arousal. I explained to Brayan that everything we do at this point to try to optimize her is sort of a hail ronni. We can treat restlessness, but it works against her alertness/arousal that we want for her to continue to rehab/cancer treatment.   I personally feel that the family should bring her home with hospice services and I  posed this question to him: 'if she has a prognosis of short weeks, how do you want her cared for?' He said he will consider this question with siblings.   Plan continues to be SHAR/SNF with oral anti-tumor agent from her outpatient oncologist.     Palliative Care Follow Up: Palliative care team will Continue to follow while inpatient    Thank you for allowing Palliative Care services to participate in the care of Damari Hong.    Medical Decision Making (MDM) for Palliative Care   Problems Addressed:   Details: Cancer with brain mets  Cancer related fatigue  Malnourishment  Data Reviewed & Analyzed:  External notes reviewed from each unique source:    Results reviewed from each unique test:    Unique tests ordered:    Select all that apply:    Details:    Risk of Complications from DIagnostic Testing & Treatment:   Details: - Drug therapy requiring intensive monitoring for toxicity    - Decision regarding hospitalization or escalation of hospital level of care   - Decision not to resuscitate or to de-escalate care because of poor prognosis   - Parenteral controlled substances      Calculated MDM Level: High      Richard Fox MD  Palliative Care Services  [facility] [phone]    Note to patient:  The 21st Century Cures Act makes medical notes like these available to patients in the interest of transparency. However, be advised this is a medical document. It is intended as peer to peer communication. It is written in medical language and may contain abbreviations or verbiage that are unfamiliar. It may appear blunt or direct. Medical documents are intended to carry relevant information, facts as evident, and the clinical opinion of the practitioner.           [1] No Known Allergies

## 2025-02-28 PROCEDURE — 99233 SBSQ HOSP IP/OBS HIGH 50: CPT | Performed by: INTERNAL MEDICINE

## 2025-02-28 NOTE — PROGRESS NOTES
02/28/25 1128   VISIT TYPE   SLP Inpatient Visit Type (Documentation Required) Attempted Treatment  (SLP attempted swallow f/u however pt not alert enough for safe po trials at this time. Will cancel video swallow and re order if warranted. Will f/u at bedside at a later time as schedule allows.)   FOLLOW UP/PLAN   Follow Up Needed (Documentation Required) Yes   SLP Follow-up Date 03/01/25     Kourtney Lujan M.S. CCC-SLP  Speech Language Pathologist  Phone Number Ext. [phone]

## 2025-02-28 NOTE — PROGRESS NOTES
[facility] Hospitalist Progress Note     CC: Hospital Follow up    PCP: Shruthi Simon MD       Assessment/Plan:     Principal Problem:    Delirium, acute  Active Problems:    Dehydration    Primary malignant neoplasm of lung metastatic to other site, unspecified laterality (HCC)    Intracranial hemorrhage (HCC)    Acute hemorrhagic infarction of brain (HCC)    Palliative care by specialist      Ms. 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         L sided weakness  New right paramedian midbrain metastatic lesion  Syncope  - noted on CT head  - hold Eliquis, ok to restart Eliquis per per NSGY  - MRI brain without bleeding noted  - NSGY and neurology consulted  - continue keppra  - stop neurochecks  - PT/OT/SW  - per neurology, 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 Monzon from [facility] he is awaiting approval of an oral immunotherapy that may have some benefit, but not curative,  -palliative care eval  -trial of decadron, was agitated 2/27 palliative stopped provigil  -2/28 appears worse was agitated last night, sleepy this morning after IV pain meds  -continue to monitor, more confused and lethargic  - hospice appropriate     Lung Ca with recurrent malignant pleural effusion  PE hx   Small R PTX - POA   Chronic hypoxic respiratory failure on home O2  - S/p R sided PleurX placement 1/31/25  - drain scheduled MWF 9:30AM, up to 1L  - Already has 2L O2 at home - at baseline now      Lung cancer with brain mets  - follow-up oncology as outpatient  - planned to start chemotherapy this week    - per family, had recent MRI brain a few weeks ago that showed decrease in size of brain mets  - has been on prednisone taper since November, currently on 10 mg daily-> now on decadron  - tegresso was ordered, Dr. Monzon from  wants her to start this, medication has been approved will start 2/27 per Dr. Monzon with oncology -> spit out medication on 2/27    DVT/PE  - restart Eliquis   - 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    Hypothyroidism  - Continue synthroid     ACP  - CODE- DNR/full- confirmed with POA  - POA- son- updated at bedside   - lives at home with son     FN:  - IVF: none  - Diet: regular     DVT Prophy: SCD, eliquis   Lines: port     Dispo: needs [facility]    Discussed with Son's and daughter at bedside     Outpatient records or previous hospital records reviewed.      Further recommendations pending patient's clinical course.  [facility] hospitalist to continue to follow patient while in house     Patient and/or patient's family given opportunity to ask questions and note understanding and agreeing with therapeutic plan as outlined     Jose Carlos Mejia MD  [facility] Hospitalist  Answering Service number: [phone]     Subjective:     Calm sleepy, less interactive, not following commands    OBJECTIVE:    Blood pressure 140/78, pulse 97, temperature 96.5 °F (35.8 °C), temperature source Temporal, resp. rate 16, height 5' 3\" (1.6 m), weight 128 lb (58.1 kg), SpO2 96%, not currently breastfeeding.    Temp:  [96.5 °F (35.8 °C)-97.1 °F (36.2 °C)] 96.5 °F (35.8 °C)  Pulse:  [] 97  Resp:  [16] 16  BP: (115-140)/(56-78) 140/78  SpO2:  [95 %-96 %] 96 %      Intake/Output:    Intake/Output Summary (Last 24 hours) at 2/28/2025 0922  Last data filed at 2/27/2025 1438  Gross per 24 hour   Intake --   Output 150 ml   Net -150 ml       Last 3 Weights   02/25/25 0507 128 lb (58.1 kg)   02/21/25 1200 126 lb 8.7  oz (57.4 kg)   02/20/25 2050 128 lb 1.4 oz (58.1 kg)   02/20/25 1146 162 lb 0.6 oz (73.5 kg)   03/23/22 0942 162 lb (73.5 kg)   01/12/22 1248 160 lb (72.6 kg)       Exam  GEN: lethargic    HEENT: NC   Pulm: CTAB, no crackles or wheezes  CV: RRR, no murmurs  ABD: Soft, non-tender, non-distended, +BS  Neuro: altered, not following commands         Data Review:       Labs:     Recent Labs   Lab 02/24/25  0403 02/25/25  0445 02/26/25  0438   RBC 3.00* 3.13* 3.09*   HGB 10.3* 10.7* 11.0*   HCT 30.3* 30.6* 30.4*   .0* 97.8 98.4   MCH 34.3* 34.2* 35.6*   MCHC 34.0 35.0 36.2   RDW 13.2 13.0 12.8   NEPRELIM  --   --  3.83   WBC 4.5 4.7 4.6   .0* 136.0* 160.0         Recent Labs   Lab 02/24/25  0403 02/25/25  0445 02/26/25  0438   GLU 93 97 147*   BUN 10 12 16   CREATSERUM 0.55 0.55 0.55   EGFRCR 96 96 96   CA 7.8* 7.8* 8.1*    142 142   K 3.6  3.6 3.6  3.6 4.3  4.3    109 110   CO2 22.0 23.0 22.0       No results for input(s): \"ALT\", \"AST\", \"ALB\", \"AMYLASE\", \"LIPASE\", \"LDH\" in the last 168 hours.    Invalid input(s): \"ALPHOS\", \"TBIL\", \"DBIL\", \"TPROT\"      Imaging:  No results found.      Meds:     INPATIENT MEDICATIONS    Scheduled Medications:      Osimertinib Mesylate, 80 mg, Daily  mirtazapine, 7.5 mg, Nightly  dexamethasone, 4 mg, TID  apixaban, 5 mg, BID  famotidine, 20 mg, Daily   Or  famotidine, 20 mg, Daily  levothyroxine, 50 mcg, Daily  levETIRAcetam, 500 mg, Q12H            Drips:  sodium chloride, Last Rate: 50 mL/hr at 02/27/25 1342          PRN Medications  HYDROmorphone, 0.2 mg, Q3H PRN  acetaminophen, 650 mg, Q4H PRN   Or  acetaminophen, 650 mg, Q4H PRN  labetalol, 10 mg, Q10 Min PRN  hydrALAzine, 10 mg, Q2H PRN  ondansetron, 4 mg, Q6H PRN   Or  metoclopramide, 10 mg, Q8H PRN  albuterol, 1 puff, Q6H PRN

## 2025-02-28 NOTE — PROGRESS NOTES
Palliative Care Progress Note    Damari A Gely Patient Status:  Inpatient    1950 MRN [id number]   Location [facility] 4W/SW/SE Attending Jose Carlos Mejia MD   Hosp Day # 8 PCP Shruthi Simon MD     Date of Consult: 2025  Patient seen at: [facility]    Reason for Consultation: Consult requested for goals of care and care coordination.    Subjective     S: More lethargic. Also intermittent agitation/fidgeting. Swallowed tigresso today.    Review of Systems: Palliative Care symptom needs assessed:     Unable to accurately obtain due to altered mental status     Palliative Care Social History:   Marital Status:    Children: Yes  Living Situation Prior to Admit: Home  Occupational History: Retired  Personal:     Spiritual  Damari Hong DANIEL    requested: No    Medical History: obtained from [facility]  Past Medical History:    Depression    off Cymbalta    Esophageal reflux    HYPERLIPIDEMIA    Lung cancer (HCC)    with brain mets    OSTEOARTHRITIS    OTHER DISEASES    Hashimoto's--sees Dr. Palmer--Endo    OTHER DISEASES    DEXA-WNL     Reflux    Thyroid disease     Past Surgical History:   Procedure Laterality Date    Appendectomy      Colonoscopy      -Dr. Rodriges--colonoscopy-normal--next 2018    Hysterectomy      partial hysterectomy-still with bilateral ovaries--due to proloapsed uterus--PAP's always normal    Knee replacement surgery      L-knee TKP-Dr. Carney '    Laparoscopic  2014    Procedure: LAPAROSCOPIC CHOLECYSTECTOMY WITH  CHOLANGIOGRAM   POSS OPEN;  Surgeon: Monik Simon MD;  [facility]    Other surgical history      Arthroscopy    Other surgical history      both knee replacement    Pleurx catheter         Family History: obtained from [facility]  Family History   Problem Relation Age of Onset    Cancer Father         EtOH, Liver Dz    Cancer Mother         colon cancer and Alzheimer;s-diagnosed 68        Allergies:  Allergies[1]    Medications:     Current Facility-Administered Medications:     HYDROmorphone (Dilaudid) 1 MG/ML injection 0.2 mg, 0.2 mg, Intravenous, Q3H PRN    Osimertinib Mesylate (Tagrisso) TABS 80 mg - PATIENTS OWN MEDICATION, 80 mg, Oral, Daily    sodium chloride 0.9% infusion, , Intravenous, Continuous    mirtazapine (Remeron) tab 7.5 mg, 7.5 mg, Oral, Nightly    dexamethasone (Decadron) 4 MG/ML injection 4 mg, 4 mg, Intravenous, TID    apixaban (Eliquis) tab 5 mg, 5 mg, Oral, BID    acetaminophen (Tylenol) tab 650 mg, 650 mg, Oral, Q4H PRN **OR** acetaminophen (Tylenol) rectal suppository 650 mg, 650 mg, Rectal, Q4H PRN    labetalol (Trandate) 5 mg/mL injection 10 mg, 10 mg, Intravenous, Q10 Min PRN    hydrALAzine (Apresoline) 20 mg/mL injection 10 mg, 10 mg, Intravenous, Q2H PRN    famotidine (Pepcid) tab 20 mg, 20 mg, Oral, Daily **OR** famotidine (Pepcid) 20 mg/2mL injection 20 mg, 20 mg, Intravenous, Daily    ondansetron (Zofran) 4 MG/2ML injection 4 mg, 4 mg, Intravenous, Q6H PRN **OR** metoclopramide (Reglan) 5 mg/mL injection 10 mg, 10 mg, Intravenous, Q8H PRN    albuterol (Ventolin HFA) 108 (90 Base) MCG/ACT inhaler 1 puff, 1 puff, Inhalation, Q6H PRN    levothyroxine (Synthroid) tab 50 mcg, 50 mcg, Oral, Daily    levETIRAcetam (Keppra) 500 mg/5mL injection 500 mg, 500 mg, Intravenous, Q12H  No current outpatient medications on file.         Palliative Performance Scale: 10 %  % Ambulation Activity Level Self-Care Intake Consciousness   100 Full  Normal  No Disease Full Normal Full   90 Full  Normal  Some Disease Full Normal Full   80 Full  Normal w/effort  Some Disease Full Normal or reduced Full   70 Reduced  Can't Perform Job  Some Disease Full Normal or reduced Full   60 Reduced  Can't Perform Hobby   Significant Disease Occ Assist Normal or reduced Full or confused   50 Mainly sit/lie Can't do any work  Extensive Disease Partial Assist Normal or reduced Full or confused   40  Mainly in bed Can't do any work  Extensive Disease Mainly Assist Normal or reduced Full or confused   30 Bed Bound Can't do any work  Extensive Disease Max Assist  Total Care Reduced  Drowsy/confused   20 Bed Bound Can't do any work  Extensive Disease Max Assist  Total Care Minimal  Drowsy/confused   10 Bed Bound Can't do any work  Extensive Disease Max Assist  Total Care Mouth Care  Drowsy/confused   0 Death        Objective      Vital Signs:  Blood pressure 117/77, pulse 96, temperature 97.4 °F (36.3 °C), temperature source Oral, resp. rate 16, height 5' 3\" (1.6 m), weight 128 lb (58.1 kg), SpO2 95%, not currently breastfeeding.  Body mass index is 22.67 kg/m².  Non-verbal signs of pain present: Yes    Physical Exam:  General: Somnolent, comfortable  HEENT: MMM.   Cardiac: RRR  Lungs: Normal effort on RA  Abdomen: Soft, non-distended  Extremities:  no  Edema present  Skin: Warm and dry.    Hematology:  Lab Results   Component Value Date    WBC 4.6 02/26/2025    HGB 11.0 (L) 02/26/2025    HCT 30.4 (L) 02/26/2025    .0 02/26/2025       Coags:  Lab Results   Component Value Date    INR 1.0 09/21/2010       Chemistry:  Lab Results   Component Value Date    CREATSERUM 0.55 02/26/2025    BUN 16 02/26/2025     02/26/2025    K 4.3 02/26/2025    K 4.3 02/26/2025     02/26/2025    CO2 22.0 02/26/2025     (H) 02/26/2025    CA 8.1 (L) 02/26/2025    ALB 4.8 03/23/2022    ALKPHO 106 03/23/2022    BILT 0.52 03/23/2022    TP 7.4 03/23/2022    AST 22 03/23/2022    ALT 19 03/23/2022    MG 2.0 02/26/2025       Imaging:  No results found.    Assessment and Recommendations        Delirium, acute    Dehydration    Primary malignant neoplasm of lung metastatic to other site, unspecified laterality (HCC)    Intracranial hemorrhage (HCC)    Acute hemorrhagic infarction of brain (HCC)    Symptom Management      Pain:  -has not traditionally had pain  -there is some brow-furrowing, indicating discomfort  -would  have low dose dilaudid 0.2mg IV q2-3hr prn available    Agitation  -possibly attributed to steroid vs terminal agitation  -family wishes to cont current dose  -prn dilaudid effective    Dyspnea:  -on room air now, previously on home 02  -due for pleurex drainage    Cachexia:  -previously on megace  -has had severely diminished caloric intake over last several weeks, sons estimate maybe 2-300 calories    Somnolence  -will stop prednisone in favor of decadron  -4mg IV TID     Prevention of Constipation:    - Recommend Dulcolax suppository daily PRN    2.     Advanced Care Planning  A voluntarily discussion and explanation regarding Advance Care Planning (ACP) took place today with patient and son. We discussed the risks vs benefits of life sustaining treatments in the setting of Damari Hong's comorbid medical conditions. Items addressed: patient preferences , code status , end-of-life care plan, and general prognosis . This resulted in a better understanding of ACP documentation , a better understanding of pt's expressed wishes/preferences , confirmation of code status , and family discussions to continue privately .   Summary:     Total time dedicated to ACP >16 minutes.       3.     Serious Illness Conversation:   Code Status: DNAR  Met with Damari and rocael Romario and Brayan. Unfortunately at the time of my meeting she appears obtunded and minimally arousable. She will nod and voice answers to simple questions but doesn't open her eyes.   I spoke with her outside oncologist Dr. Monzon who is in contact with her sons. We will plan to try to optimize her performance, however if her mental status does not improve, we may plan to pivot to comfort care/hospice.  Leti is more somnolent and restless. She is oriented upon arousal. I explained to Brayan that everything we do at this point to try to optimize her is sort of a hail ronni. We can treat restlessness, but it works against her alertness/arousal that we want for  her to continue to rehab/cancer treatment.   I personally feel that the family should bring her home with hospice services and I posed this question to him: 'if she has a prognosis of short weeks, how do you want her cared for?' He said he will consider this question with siblings.   Plan continues to be [facility]/SNF with oral anti-tumor agent from her outpatient oncologist.   2/28 Family is vigilant for any effect of the antitumor agent. Unfortunately her cognition seems to be declining or stably poor.  Dr. Sanchezed to speak to them this afternoon.  She meets inpatient hospice criteria.     Palliative Care Follow Up: Palliative care team will Continue to follow while inpatient    Thank you for allowing Palliative Care services to participate in the care of Damari Hong.    Medical Decision Making (MDM) for Palliative Care   Problems Addressed:   Details: Cancer with brain mets  Cancer related fatigue  Malnourishment  Data Reviewed & Analyzed:  External notes reviewed from each unique source:    Results reviewed from each unique test:    Unique tests ordered:    Select all that apply:    Details:    Risk of Complications from DIagnostic Testing & Treatment:   Details: - Drug therapy requiring intensive monitoring for toxicity    - Decision regarding hospitalization or escalation of hospital level of care   - Decision not to resuscitate or to de-escalate care because of poor prognosis   - Parenteral controlled substances      Calculated MDM Level: High      Richard Fox MD  Palliative Care Services  [facility] [phone]    Note to patient:  The 21st Century Cures Act makes medical notes like these available to patients in the interest of transparency. However, be advised this is a medical document. It is intended as peer to peer communication. It is written in medical language and may contain abbreviations or verbiage that are unfamiliar. It may appear blunt or direct. Medical documents are intended to  carry relevant information, facts as evident, and the clinical opinion of the practitioner.           [1] No Known Allergies

## 2025-02-28 NOTE — PALLIATIVE CARE NOTE
Brief Palliative Care Note    Discussed with [facility] oncologist Dr. Monzon. He has updated family. He is recommending we stop pain meds unless she is really suffering in the event that they are sedating and contributing to her lethargy.    I think it more likely that this is her brain stem disease but will hold off on pain meds.     Please notify the hospital physician or myself if she is complaining of pain.     Richard Fox MD  Palliative Care Services  [facility] Ph: [phone]                        Richard Fox MD  Palliative Care Services  [facility] [phone]

## 2025-02-28 NOTE — PLAN OF CARE
Patient is lethargic. On room air. VSS. Bedrest overnight. IVF continued. Dilaudid for pain. Restless overnight; MD paged-- see orders. Pleurex dressing reinforced. Family at the bedside overnight. Frequent rounding. Call light within reach; safety precautions in place.   Problem: NEUROLOGICAL - ADULT  Goal: Achieves stable or improved neurological status  Description: INTERVENTIONS  - Assess for and report changes in neurological status  - Initiate measures to prevent increased intracranial pressure  - Maintain blood pressure and fluid volume within ordered parameters to optimize cerebral perfusion and minimize risk of hemorrhage  - Monitor temperature, glucose, and sodium. Initiate appropriate interventions as ordered  Outcome: Not Progressing  Goal: Absence of seizures  Description: INTERVENTIONS  - Monitor for seizure activity  - Administer anti-seizure medications as ordered  - Monitor neurological status  Outcome: Progressing  Goal: Remains free of injury related to seizure activity  Description: INTERVENTIONS:  - Maintain airway, patient safety  and administer oxygen as ordered  - Monitor patient for seizure activity, document and report duration and description of seizure to MD/LIP  - If seizure occurs, turn patient to side and suction secretions as needed  - Reorient patient post seizure  - Seizure pads on all 4 side rails  - Instruct patient/family to notify RN of any seizure activity  - Instruct patient/family to call for assistance with activity based on assessment  Outcome: Progressing  Goal: Achieves maximal functionality and self care  Description: INTERVENTIONS  - Monitor swallowing and airway patency with patient fatigue and changes in neurological status  - Encourage and assist patient to increase activity and self care with guidance from PT/OT  - Encourage visually impaired, hearing impaired and aphasic patients to use assistive/communication devices  Outcome: Progressing     Problem: Impaired  Cognition  Goal: Patient will exhibit improved attention, thought processing and/or memory  Description: Interventions:  - Minimize distractions in the room when full attention is required  Outcome: Not Progressing     Problem: PAIN - ADULT  Goal: Verbalizes/displays adequate comfort level or patient's stated pain goal  Description: INTERVENTIONS:  - Encourage pt to monitor pain and request assistance  - Assess pain using appropriate pain scale  - Administer analgesics based on type and severity of pain and evaluate response  - Implement non-pharmacological measures as appropriate and evaluate response  - Consider cultural and social influences on pain and pain management  - Manage/alleviate anxiety  - Utilize distraction and/or relaxation techniques  - Monitor for opioid side effects  - Notify MD/LIP if interventions unsuccessful or patient reports new pain  - Anticipate increased pain with activity and pre-medicate as appropriate  Outcome: Progressing     Problem: SAFETY ADULT - FALL  Goal: Free from fall injury  Description: INTERVENTIONS:  - Assess pt frequently for physical needs  - Identify cognitive and physical deficits and behaviors that affect risk of falls.  - [place] fall precautions as indicated by assessment.  - Educate pt/family on patient safety including physical limitations  - Instruct pt to call for assistance with activity based on assessment  - Modify environment to reduce risk of injury  - Provide assistive devices as appropriate  - Consider OT/PT consult to assist with strengthening/mobility  - Encourage toileting schedule  Outcome: Progressing

## 2025-02-28 NOTE — PAYOR COMM NOTE
--------------  2/28:  CONTINUED STAY REVIEW    Payor: [employer] MED[employer] ADV PPO  Subscriber #:  [id number]  Authorization Number: [id number]    Admit date: 2/20/25  Admit time:  8:41 PM    HOSPITALIST:    [facility] Hospitalist Progress Note      CC: Hospital Follow up     PCP: Shruthi Simon MD         Assessment/Plan:      Principal Problem:    Delirium, acute  Active Problems:    Dehydration    Primary malignant neoplasm of lung metastatic to other site, unspecified laterality (HCC)    Intracranial hemorrhage (HCC)    Acute hemorrhagic infarction of brain (HCC)    Palliative care by specialist       Ms. 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         L sided weakness  New right paramedian midbrain metastatic lesion  Syncope  - noted on CT head  - hold Eliquis, ok to restart Eliquis per per NSGY  - MRI brain without bleeding noted  - NSGY and neurology consulted  - continue keppra  - stop neurochecks  - PT/OT/SW  - per neurology, 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 Monzon from [facility] he is awaiting approval of an oral immunotherapy that may have some benefit, but not curative,  -palliative care eval  -trial of decadron, was agitated 2/27 palliative stopped provigil  -2/28 appears worse was agitated last night, sleepy this morning after IV pain meds  -continue to monitor, more confused and lethargic  - hospice appropriate     Lung Ca with recurrent malignant pleural  effusion  PE hx   Small R PTX - POA   Chronic hypoxic respiratory failure on home O2  - S/p R sided PleurX placement 1/31/25  - drain scheduled MWF 9:30AM, up to 1L  - Already has 2L O2 at home - at baseline now     Lung cancer with brain mets  - follow-up oncology as outpatient  - planned to start chemotherapy this week    - per family, had recent MRI brain a few weeks ago that showed decrease in size of brain mets  - has been on prednisone taper since November, currently on 10 mg daily-> now on decadron  - tegresso was ordered, Dr. Monzon from  wants her to start this, medication has been approved will start 2/27 per Dr. Monzon with oncology -> spit out medication on 2/27    DVT/PE  - restart Eliquis   - 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    Hypothyroidism  - Continue synthroid     ACP  - CODE- DNR/full- confirmed with POA  - POA- son- updated at bedside   - lives at home with son     FN:  - IVF: none  - Diet: regular     DVT Prophy: SCD, eliquis   Lines: port     Dispo: needs [facility]     Discussed with Son's and daughter at bedside     Outpatient records or previous hospital records reviewed.      Further recommendations pending patient's clinical course.  [facility] hospitalist to continue to follow patient while in house     Patient and/or patient's family given opportunity to ask questions and note understanding and agreeing with therapeutic plan as outlined     Jose Carlos Mejia MD  [facility] Hospitalist  Answering Service number: [phone]      Subjective:      Calm sleepy, less interactive, not following commands     OBJECTIVE:     Blood pressure 140/78, pulse 97, temperature 96.5 °F (35.8 °C), temperature source Temporal, resp. rate 16, height 5' 3\" (1.6 m), weight 128 lb (58.1 kg), SpO2 96%, not currently breastfeeding.     Temp:  [96.5 °F (35.8 °C)-97.1 °F (36.2 °C)] 96.5 °F (35.8 °C)  Pulse:  [] 97  Resp:  [16] 16  BP: (115-140)/(56-78) 140/78  SpO2:  [95 %-96 %] 96 %        Intake/Output:      Intake/Output Summary (Last 24 hours) at 2/28/2025 0922  Last data filed at 2/27/2025 1438      Gross per 24 hour   Intake --   Output 150 ml   Net -150 ml              Last 3 Weights   02/25/25 0507 128 lb (58.1 kg)   02/21/25 1200 126 lb 8.7 oz (57.4 kg)   02/20/25 2050 128 lb 1.4 oz (58.1 kg)   02/20/25 1146 162 lb 0.6 oz (73.5 kg)   03/23/22 0942 162 lb (73.5 kg)   01/12/22 1248 160 lb (72.6 kg)         Exam  GEN: lethargic    HEENT: NC   Pulm: CTAB, no crackles or wheezes  CV: RRR, no murmurs  ABD: Soft, non-tender, non-distended, +BS  Neuro: altered, not following commands           Data Review:       Labs:            Recent Labs   Lab 02/24/25  0403 02/25/25 0445 02/26/25  0438   RBC 3.00* 3.13* 3.09*   HGB 10.3* 10.7* 11.0*   HCT 30.3* 30.6* 30.4*   .0* 97.8 98.4   MCH 34.3* 34.2* 35.6*   MCHC 34.0 35.0 36.2   RDW 13.2 13.0 12.8   NEPRELIM  --   --  3.83   WBC 4.5 4.7 4.6   .0* 136.0* 160.0                  Recent Labs   Lab 02/24/25  0403 02/25/25  0445 02/26/25  0438   GLU 93 97 147*   BUN 10 12 16   CREATSERUM 0.55 0.55 0.55   EGFRCR 96 96 96   CA 7.8* 7.8* 8.1*    142 142   K 3.6  3.6 3.6  3.6 4.3  4.3    109 110   CO2 22.0 23.0 22.0         No results for input(s): \"ALT\", \"AST\", \"ALB\", \"AMYLASE\", \"LIPASE\", \"LDH\" in the last 168 hours.     Invalid input(s): \"ALPHOS\", \"TBIL\", \"DBIL\", \"TPROT\"        Imaging:  No results found.        Meds:      INPATIENT MEDICATIONS     Scheduled Medications:                                            Scheduled Meds   Osimertinib Mesylate, 80 mg, Daily  mirtazapine, 7.5 mg, Nightly  dexamethasone, 4 mg, TID  apixaban, 5 mg, BID  famotidine, 20 mg, Daily   Or  famotidine, 20 mg, Daily  levothyroxine, 50 mcg, Daily  levETIRAcetam, 500 mg, Q12H                                                               Drips:    IV Meds   sodium chloride, Last Rate: 50 mL/hr at 02/27/25 1342               PRN Medications    PRN Meds   HYDROmorphone, 0.2 mg,  Q3H PRN  acetaminophen, 650 mg, Q4H PRN   Or  acetaminophen, 650 mg, Q4H PRN  labetalol, 10 mg, Q10 Min PRN  hydrALAzine, 10 mg, Q2H PRN  ondansetron, 4 mg, Q6H PRN   Or  metoclopramide, 10 mg, Q8H PRN  albuterol, 1 puff, Q6H PRN               MEDICATIONS ADMINISTERED IN LAST 1 DAY:  apixaban (Eliquis) tab 5 mg       Date Action Dose Route User    2/28/2025 0930 Given 5 mg Oral Bonny Sam RN    2/27/2025 2125 Given 5 mg Oral Shira Sicnlair RN          dexamethasone (Decadron) 4 MG/ML injection 4 mg       Date Action Dose Route User    2/28/2025 0931 Given 4 mg Intravenous Bonny Sam RN    2/27/2025 2125 Given 4 mg Intravenous Shira Sinclair RN    2/27/2025 1630 Given 4 mg Intravenous Bonny Sam RN          famotidine (Pepcid) 20 mg/2mL injection 20 mg       Date Action Dose Route User    2/28/2025 0931 Given 20 mg Intravenous Bonny Sam RN          HYDROmorphone (Dilaudid) 1 MG/ML injection 0.2 mg       Date Action Dose Route User    2/28/2025 1342 Given 0.2 mg Intravenous Bonny Sam RN    2/28/2025 0637 Given 0.2 mg Intravenous Shira Sinclair, [place]    2/27/2025 2153 Given 0.2 mg Intravenous Shira Sinclair RN    2/27/2025 1630 Given 0.2 mg Intravenous Bonny Sam RN          levETIRAcetam (Keppra) 500 mg/5mL injection 500 mg       Date Action Dose Route User    2/28/2025 0931 Given 500 mg Intravenous Bonny Sam RN    2/27/2025 2142 Given 500 mg Intravenous Shira Sinclair RN          levothyroxine (Synthroid) tab 50 mcg       Date Action Dose Route User    2/28/2025 0930 Given 50 mcg Oral Bonny Sam RN          LORazepam (Ativan) 2 mg/mL injection 0.5 mg       Date Action Dose Route User    2/27/2025 2352 Given 0.5 mg Intravenous Shira Sinclair, RN          Osimertinib Mesylate (Tagrisso) TABS 80 mg - PATIENTS OWN MEDICATION       Date Action Dose Route User    2/28/2025 0946 Given 80 mg Oral Bonny Sam, RN            Vitals (last day)        Date/Time Temp Pulse Resp BP SpO2 Weight O2 Device O2 Flow Rate (L/min) [facility]    02/28/25 1055 97.4 °F (36.3 °C) 96 16 117/77 95 % -- None (Room air) --     02/28/25 0544 96.5 °F (35.8 °C) 97 16 140/78 96 % -- None (Room air) --     02/27/25 2052 97.1 °F (36.2 °C) 108 16 115/56 95 % -- None (Room air) -- MM    02/27/25 1400 -- 87 16 119/73 -- -- -- -- DF    02/27/25 0400 -- 86 -- -- -- -- -- --     02/27/25 0314 98.1 °F (36.7 °C) -- 16 119/73 95 % -- None (Room air) -- LV

## 2025-02-28 NOTE — PROGRESS NOTES
PT attempted in PM family is having a conference regarding POC palliative comfort care options. Pt family present asking to hold PT at this time until family decides what they all want moving forward. PT will continue to follow and progress as per pt and family wishes and medical progress.

## 2025-03-01 LAB
ANION GAP SERPL CALC-SCNC: 10 MMOL/L (ref 0–18)
BASOPHILS # BLD AUTO: 0.01 X10(3) UL (ref 0–0.2)
BASOPHILS NFR BLD AUTO: 0.2 %
BUN BLD-MCNC: 24 MG/DL (ref 9–23)
BUN/CREAT SERPL: 43.6 (ref 10–20)
CALCIUM BLD-MCNC: 7.8 MG/DL (ref 8.7–10.4)
CHLORIDE SERPL-SCNC: 113 MMOL/L (ref 98–112)
CO2 SERPL-SCNC: 22 MMOL/L (ref 21–32)
CREAT BLD-MCNC: 0.55 MG/DL
DEPRECATED RDW RBC AUTO: 43.8 FL (ref 35.1–46.3)
EGFRCR SERPLBLD CKD-EPI 2021: 96 ML/MIN/1.73M2 (ref 60–?)
EOSINOPHIL # BLD AUTO: 0 X10(3) UL (ref 0–0.7)
EOSINOPHIL NFR BLD AUTO: 0 %
ERYTHROCYTE [DISTWIDTH] IN BLOOD BY AUTOMATED COUNT: 12.6 % (ref 11–15)
GLUCOSE BLD-MCNC: 143 MG/DL (ref 70–99)
HCT VFR BLD AUTO: 28.9 %
HGB BLD-MCNC: 10.2 G/DL
IMM GRANULOCYTES # BLD AUTO: 0.06 X10(3) UL (ref 0–1)
IMM GRANULOCYTES NFR BLD: 1.3 %
LYMPHOCYTES # BLD AUTO: 0.32 X10(3) UL (ref 1–4)
LYMPHOCYTES NFR BLD AUTO: 6.8 %
MAGNESIUM SERPL-MCNC: 2 MG/DL (ref 1.6–2.6)
MCH RBC QN AUTO: 34 PG (ref 26–34)
MCHC RBC AUTO-ENTMCNC: 35.3 G/DL (ref 31–37)
MCV RBC AUTO: 96.3 FL
MONOCYTES # BLD AUTO: 0.39 X10(3) UL (ref 0.1–1)
MONOCYTES NFR BLD AUTO: 8.3 %
NEUTROPHILS # BLD AUTO: 3.91 X10 (3) UL (ref 1.5–7.7)
NEUTROPHILS # BLD AUTO: 3.91 X10(3) UL (ref 1.5–7.7)
NEUTROPHILS NFR BLD AUTO: 83.4 %
OSMOLALITY SERPL CALC.SUM OF ELEC: 307 MOSM/KG (ref 275–295)
PLATELET # BLD AUTO: 188 10(3)UL (ref 150–450)
POTASSIUM SERPL-SCNC: 3.7 MMOL/L (ref 3.5–5.1)
RBC # BLD AUTO: 3 X10(6)UL
SODIUM SERPL-SCNC: 145 MMOL/L (ref 136–145)
WBC # BLD AUTO: 4.7 X10(3) UL (ref 4–11)

## 2025-03-01 RX ORDER — POTASSIUM CHLORIDE 14.9 MG/ML
20 INJECTION INTRAVENOUS ONCE
Status: COMPLETED | OUTPATIENT
Start: 2025-03-01 | End: 2025-03-01

## 2025-03-01 NOTE — PLAN OF CARE
Patient is lethargic on room air. VSS. Bed bound. Voids via purewick. No BM overnight. IVF continued. Support given to family. Call light within reach; safety precautions in place.   Problem: NEUROLOGICAL - ADULT  Goal: Achieves stable or improved neurological status  Description: INTERVENTIONS  - Assess for and report changes in neurological status  - Initiate measures to prevent increased intracranial pressure  - Maintain blood pressure and fluid volume within ordered parameters to optimize cerebral perfusion and minimize risk of hemorrhage  - Monitor temperature, glucose, and sodium. Initiate appropriate interventions as ordered  Outcome: Progressing  Goal: Absence of seizures  Description: INTERVENTIONS  - Monitor for seizure activity  - Administer anti-seizure medications as ordered  - Monitor neurological status  Outcome: Progressing  Goal: Remains free of injury related to seizure activity  Description: INTERVENTIONS:  - Maintain airway, patient safety  and administer oxygen as ordered  - Monitor patient for seizure activity, document and report duration and description of seizure to MD/LIP  - If seizure occurs, turn patient to side and suction secretions as needed  - Reorient patient post seizure  - Seizure pads on all 4 side rails  - Instruct patient/family to notify RN of any seizure activity  - Instruct patient/family to call for assistance with activity based on assessment  Outcome: Progressing  Goal: Achieves maximal functionality and self care  Description: INTERVENTIONS  - Monitor swallowing and airway patency with patient fatigue and changes in neurological status  - Encourage and assist patient to increase activity and self care with guidance from PT/OT  - Encourage visually impaired, hearing impaired and aphasic patients to use assistive/communication devices  Outcome: Progressing     Problem: Impaired Cognition  Goal: Patient will exhibit improved attention, thought processing and/or  memory  Description: Interventions:  - Provide stimulation to the neglected side by encouraging family to sit/stand on the inattentive side during conversation  Outcome: Progressing     Problem: PAIN - ADULT  Goal: Verbalizes/displays adequate comfort level or patient's stated pain goal  Description: INTERVENTIONS:  - Encourage pt to monitor pain and request assistance  - Assess pain using appropriate pain scale  - Administer analgesics based on type and severity of pain and evaluate response  - Implement non-pharmacological measures as appropriate and evaluate response  - Consider cultural and social influences on pain and pain management  - Manage/alleviate anxiety  - Utilize distraction and/or relaxation techniques  - Monitor for opioid side effects  - Notify MD/LIP if interventions unsuccessful or patient reports new pain  - Anticipate increased pain with activity and pre-medicate as appropriate  Outcome: Progressing

## 2025-03-01 NOTE — PROGRESS NOTES
[facility] Hospitalist Progress Note     CC: Hospital Follow up    PCP: Shruthi Simon MD       Assessment/Plan:     Principal Problem:    Delirium, acute  Active Problems:    Dehydration    Primary malignant neoplasm of lung metastatic to other site, unspecified laterality (HCC)    Intracranial hemorrhage (HCC)    Acute hemorrhagic infarction of brain (HCC)    Palliative care by specialist      Ms. 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         L sided weakness  New right paramedian midbrain metastatic lesion  Syncope  - noted on CT head  - hold Eliquis, ok to restart Eliquis per per NSGY  - MRI brain without bleeding noted  - NSGY and neurology consulted  - continue keppra  - stop neurochecks  - PT/OT/SW  - per neurology, 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 Monzon from [facility] he is awaiting approval of an oral immunotherapy that may have some benefit, but not curative,  -palliative care eval  -trial of decadron, was agitated 2/27 palliative stopped provigil  -2/28 appears worse was agitated last night, sleepy this morning after IV pain meds  -continue to monitor, more confused and lethargic  - hospice appropriate     Lung Ca with recurrent malignant pleural effusion  PE hx   Small R PTX - POA   Chronic hypoxic respiratory failure on home O2  - S/p R sided PleurX placement 1/31/25  - drain scheduled MWF 9:30AM, up to 1L  - Already has 2L O2 at home - at baseline now      Lung cancer with brain mets  - follow-up oncology as outpatient  - planned to start chemotherapy this week    - per family, had recent MRI brain a few weeks ago that showed decrease in size of brain mets  - has been on prednisone taper since November, currently on 10 mg daily-> now on decadron  - tegresso was ordered, Dr. Monzon from  wants her to start this, medication has been approved will start 2/27 per Dr. Monzon with oncology -> spit out medication on 2/27    DVT/PE  - restart Eliquis   - 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    Hypothyroidism  - Continue synthroid     ACP  - CODE- DNR/select- updated today with POA  - POA- son Brayan updated at bedside   - lives at home with son     FN:  - IVF: none  - Diet: regular     DVT Prophy: SCD, eliquis   Lines: port     Dispo: pending course; family discussing hospice pending response with Tagrisso    Discussed with multiple family members at bedside.      Outpatient records or previous hospital records reviewed.      Further recommendations pending patient's clinical course.  [facility] hospitalist to continue to follow patient while in house     Patient and/or patient's family given opportunity to ask questions and note understanding and agreeing with therapeutic plan as outlined    Vanessa Elam MD  [facility] Hospitalist  Answering Service number: [phone]     Subjective:     Sleepy. No increased pain with stopping IV dilaudid. Has gotten 2 doses of Tagrisso. Multiple family members at bedside.     OBJECTIVE:    Blood pressure 124/80, pulse 99, temperature 97.5 °F (36.4 °C), temperature source Axillary, resp. rate 16, height 5' 3\" (1.6 m), weight 128 lb (58.1 kg), SpO2 95%, not currently breastfeeding.    Temp:  [97.5 °F (36.4 °C)-98.6 °F (37 °C)] 97.5 °F (36.4 °C)  Pulse:  [92-99] 99  Resp:  [16-18] 16  BP: (124-154)/(80-94) 124/80  SpO2:  [94 %-96 %] 95 %      Intake/Output:    Intake/Output Summary (Last 24 hours) at 3/1/2025 9336  Last data filed at  3/1/2025 1122  Gross per 24 hour   Intake --   Output 710 ml   Net -710 ml       Last 3 Weights   02/25/25 0507 128 lb (58.1 kg)   02/21/25 1200 126 lb 8.7 oz (57.4 kg)   02/20/25 2050 128 lb 1.4 oz (58.1 kg)   02/20/25 1146 162 lb 0.6 oz (73.5 kg)   03/23/22 0942 162 lb (73.5 kg)   01/12/22 1248 160 lb (72.6 kg)       Exam  GEN: lethargic, answers some questions and follows commands  HEENT: NC   Pulm: CTAB, no crackles or wheezes  CV: RRR, no murmurs  ABD: Soft, non-tender, non-distended, +BS  Neuro: LUE/LLE weakness, follows some commands      Data Review:       Labs:     Recent Labs   Lab 02/25/25 0445 02/26/25 0438 03/01/25  0432   RBC 3.13* 3.09* 3.00*   HGB 10.7* 11.0* 10.2*   HCT 30.6* 30.4* 28.9*   MCV 97.8 98.4 96.3   MCH 34.2* 35.6* 34.0   MCHC 35.0 36.2 35.3   RDW 13.0 12.8 12.6   NEPRELIM  --  3.83 3.91   WBC 4.7 4.6 4.7   .0* 160.0 188.0         Recent Labs   Lab 02/25/25 0445 02/26/25 0438 03/01/25  0433   GLU 97 147* 143*   BUN 12 16 24*   CREATSERUM 0.55 0.55 0.55   EGFRCR 96 96 96   CA 7.8* 8.1* 7.8*    142 145   K 3.6  3.6 4.3  4.3 3.7    110 113*   CO2 23.0 22.0 22.0       No results for input(s): \"ALT\", \"AST\", \"ALB\", \"AMYLASE\", \"LIPASE\", \"LDH\" in the last 168 hours.    Invalid input(s): \"ALPHOS\", \"TBIL\", \"DBIL\", \"TPROT\"      Imaging:  No results found.      Meds:     INPATIENT MEDICATIONS    Scheduled Medications:      potassium chloride, 20 mEq, Once  Osimertinib Mesylate, 80 mg, Daily  dexamethasone, 4 mg, TID  apixaban, 5 mg, BID  famotidine, 20 mg, Daily   Or  famotidine, 20 mg, Daily  levothyroxine, 50 mcg, Daily  levETIRAcetam, 500 mg, Q12H            Drips:  sodium chloride, Last Rate: 50 mL/hr at 02/27/25 1342          PRN Medications  acetaminophen, 650 mg, Q4H PRN   Or  acetaminophen, 650 mg, Q4H PRN  labetalol, 10 mg, Q10 Min PRN  hydrALAzine, 10 mg, Q2H PRN  ondansetron, 4 mg, Q6H PRN   Or  metoclopramide, 10 mg, Q8H PRN  albuterol, 1 puff, Q6H  PRN

## 2025-03-01 NOTE — PLAN OF CARE
Patient is lethargic today. On room air. VSS. IVFs continued. Dilaudid for pain. Restless throughout the day. Able to swallow medications crushed in applesauce; tag[place] allowed to dissolve in water and given in applesauce. Pleurex drained today, 160 ml of sero-sang fluid removed; new dressing placed. Family at the bedside throughout the day. Frequent rounding. Call light within reach; safety precautions in place.    Problem: Patient Centered Care  Goal: Patient preferences are identified and integrated in the patient's plan of care  Description: Interventions:  - What would you like us to know as we care for you? \"My family helps take care of me.\"  - Provide timely, complete, and accurate information to patient/family  - Incorporate patient and family knowledge, values, beliefs, and cultural backgrounds into the planning and delivery of care  - Encourage patient/family to participate in care and decision-making at the level they choose  - Honor patient and family perspectives and choices  2/28/2025 1931 by Bonny Sam, RN  Outcome: Progressing  2/28/2025 1928 by Bonny Sam, RN  Outcome: Progressing     Problem: Patient/Family Goals  Goal: Patient/Family Long Term Goal  Description: Patient's Long Term Goal: Feel better    Interventions:  - Educate on taking medications as prescribed  - Monitor vitals  -Up as tolerated  - See additional Care Plan goals for specific interventions  2/28/2025 1931 by Bonny Sam, RN  Outcome: Progressing  2/28/2025 1928 by Bonny Sam, RN  Outcome: Progressing  Goal: Patient/Family Short Term Goal  Description: Patient's Short Term Goal: No syncopal episodes    Interventions:   - EKG  - EEG  - Neurology consult  - Monitor vitals  - MRI  - See additional Care Plan goals for specific interventions  2/28/2025 1931 by Bonny Sam, RN  Outcome: Progressing  2/28/2025 1928 by Bonny Sam, RN  Outcome: Progressing     Problem: NEUROLOGICAL - ADULT  Goal:  Achieves stable or improved neurological status  Description: INTERVENTIONS  - Assess for and report changes in neurological status  - Initiate measures to prevent increased intracranial pressure  - Maintain blood pressure and fluid volume within ordered parameters to optimize cerebral perfusion and minimize risk of hemorrhage  - Monitor temperature, glucose, and sodium. Initiate appropriate interventions as ordered  2/28/2025 1931 by Bonny Sam RN  Outcome: Progressing  2/28/2025 1928 by Bonny Sam RN  Outcome: Progressing  Goal: Absence of seizures  Description: INTERVENTIONS  - Monitor for seizure activity  - Administer anti-seizure medications as ordered  - Monitor neurological status  2/28/2025 1931 by Bonny Sam RN  Outcome: Progressing  2/28/2025 1928 by Bonny Sam RN  Outcome: Progressing  Goal: Remains free of injury related to seizure activity  Description: INTERVENTIONS:  - Maintain airway, patient safety  and administer oxygen as ordered  - Monitor patient for seizure activity, document and report duration and description of seizure to MD/LIP  - If seizure occurs, turn patient to side and suction secretions as needed  - Reorient patient post seizure  - Seizure pads on all 4 side rails  - Instruct patient/family to notify RN of any seizure activity  - Instruct patient/family to call for assistance with activity based on assessment  2/28/2025 1931 by Bonny Sam RN  Outcome: Progressing  2/28/2025 1928 by Bonny Sam RN  Outcome: Progressing  Goal: Achieves maximal functionality and self care  Description: INTERVENTIONS  - Monitor swallowing and airway patency with patient fatigue and changes in neurological status  - Encourage and assist patient to increase activity and self care with guidance from PT/OT  - Encourage visually impaired, hearing impaired and aphasic patients to use assistive/communication devices  2/28/2025 1931 by Bonny Sam,  RN  Outcome: Progressing  2/28/2025 1928 by Bonny Sam RN  Outcome: Progressing     Problem: Impaired Cognition  Goal: Patient will exhibit improved attention, thought processing and/or memory  Description: Interventions:    2/28/2025 1931 by Bonny Sam RN  Outcome: Progressing  2/28/2025 1928 by Bonny Sam RN  Outcome: Progressing     Problem: PAIN - ADULT  Goal: Verbalizes/displays adequate comfort level or patient's stated pain goal  Description: INTERVENTIONS:  - Encourage pt to monitor pain and request assistance  - Assess pain using appropriate pain scale  - Administer analgesics based on type and severity of pain and evaluate response  - Implement non-pharmacological measures as appropriate and evaluate response  - Consider cultural and social influences on pain and pain management  - Manage/alleviate anxiety  - Utilize distraction and/or relaxation techniques  - Monitor for opioid side effects  - Notify MD/LIP if interventions unsuccessful or patient reports new pain  - Anticipate increased pain with activity and pre-medicate as appropriate  2/28/2025 1931 by Bonny Sam RN  Outcome: Progressing  2/28/2025 1928 by Bonny Sam RN  Outcome: Progressing     Problem: SAFETY ADULT - FALL  Goal: Free from fall injury  Description: INTERVENTIONS:  - Assess pt frequently for physical needs  - Identify cognitive and physical deficits and behaviors that affect risk of falls.  - [place] fall precautions as indicated by assessment.  - Educate pt/family on patient safety including physical limitations  - Instruct pt to call for assistance with activity based on assessment  - Modify environment to reduce risk of injury  - Provide assistive devices as appropriate  - Consider OT/PT consult to assist with strengthening/mobility  - Encourage toileting schedule  2/28/2025 1931 by Bonny Sam RN  Outcome: Progressing  2/28/2025 1928 by Bonny Sam RN  Outcome: Progressing      Problem: DISCHARGE PLANNING  Goal: Discharge to home or other facility with appropriate resources  Description: INTERVENTIONS:  - Identify barriers to discharge w/pt and caregiver  - Include patient/family/discharge partner in discharge planning  - Arrange for needed discharge resources and transportation as appropriate  - Identify discharge learning needs (meds, wound care, etc)  - Arrange for interpreters to assist at discharge as needed  - Consider post-discharge preferences of patient/family/discharge partner  - Complete POLST form as appropriate  - Assess patient's ability to be responsible for managing their own health  - Refer to Case Management Department for coordinating discharge planning if the patient needs post-hospital services based on physician/LIP order or complex needs related to functional status, cognitive ability or social support system  2/28/2025 1931 by Bonny Sam, RN  Outcome: Progressing  2/28/2025 1928 by Bonny Sam, RN  Outcome: Progressing

## 2025-03-01 NOTE — PLAN OF CARE
Leti can answer all orientation questions when alert but is very lethargic. She denies pain or nausea. She is tolerating little to no food. Meds were taken this morning crushed in pudding, tolerated well. Patient is a max assist. Family are at bedside. Call light within reach.  Problem: Patient Centered Care  Goal: Patient preferences are identified and integrated in the patient's plan of care  Description: Interventions:  - What would you like us to know as we care for you? \"My family helps take care of me.\"  - Provide timely, complete, and accurate information to patient/family  - Incorporate patient and family knowledge, values, beliefs, and cultural backgrounds into the planning and delivery of care  - Encourage patient/family to participate in care and decision-making at the level they choose  - Honor patient and family perspectives and choices  Outcome: Progressing     Problem: Patient/Family Goals  Goal: Patient/Family Long Term Goal  Description: Patient's Long Term Goal: Feel better    Interventions:  - Educate on taking medications as prescribed  - Monitor vitals  -Up as tolerated  - See additional Care Plan goals for specific interventions  Outcome: Progressing  Goal: Patient/Family Short Term Goal  Description: Patient's Short Term Goal: No syncopal episodes    Interventions:   - EKG  - EEG  - Neurology consult  - Monitor vitals  - MRI  - See additional Care Plan goals for specific interventions  Outcome: Progressing     Problem: NEUROLOGICAL - ADULT  Goal: Absence of seizures  Description: INTERVENTIONS  - Monitor for seizure activity  - Administer anti-seizure medications as ordered  - Monitor neurological status  Outcome: Progressing  Goal: Remains free of injury related to seizure activity  Description: INTERVENTIONS:  - Maintain airway, patient safety  and administer oxygen as ordered  - Monitor patient for seizure activity, document and report duration and description of seizure to MD/LIP  - If  seizure occurs, turn patient to side and suction secretions as needed  - Reorient patient post seizure  - Seizure pads on all 4 side rails  - Instruct patient/family to notify RN of any seizure activity  - Instruct patient/family to call for assistance with activity based on assessment  Outcome: Progressing     Problem: PAIN - ADULT  Goal: Verbalizes/displays adequate comfort level or patient's stated pain goal  Description: INTERVENTIONS:  - Encourage pt to monitor pain and request assistance  - Assess pain using appropriate pain scale  - Administer analgesics based on type and severity of pain and evaluate response  - Implement non-pharmacological measures as appropriate and evaluate response  - Consider cultural and social influences on pain and pain management  - Manage/alleviate anxiety  - Utilize distraction and/or relaxation techniques  - Monitor for opioid side effects  - Notify MD/LIP if interventions unsuccessful or patient reports new pain  - Anticipate increased pain with activity and pre-medicate as appropriate  Outcome: Progressing     Problem: SAFETY ADULT - FALL  Goal: Free from fall injury  Description: INTERVENTIONS:  - Assess pt frequently for physical needs  - Identify cognitive and physical deficits and behaviors that affect risk of falls.  - [place] fall precautions as indicated by assessment.  - Educate pt/family on patient safety including physical limitations  - Instruct pt to call for assistance with activity based on assessment  - Modify environment to reduce risk of injury  - Provide assistive devices as appropriate  - Consider OT/PT consult to assist with strengthening/mobility  - Encourage toileting schedule  Outcome: Progressing     Problem: DISCHARGE PLANNING  Goal: Discharge to home or other facility with appropriate resources  Description: INTERVENTIONS:  - Identify barriers to discharge w/pt and caregiver  - Include patient/family/discharge partner in discharge planning  -  Arrange for needed discharge resources and transportation as appropriate  - Identify discharge learning needs (meds, wound care, etc)  - Arrange for interpreters to assist at discharge as needed  - Consider post-discharge preferences of patient/family/discharge partner  - Complete POLST form as appropriate  - Assess patient's ability to be responsible for managing their own health  - Refer to Case Management Department for coordinating discharge planning if the patient needs post-hospital services based on physician/LIP order or complex needs related to functional status, cognitive ability or social support system  Outcome: Progressing     Problem: NEUROLOGICAL - ADULT  Goal: Achieves stable or improved neurological status  Description: INTERVENTIONS  - Assess for and report changes in neurological status  - Initiate measures to prevent increased intracranial pressure  - Maintain blood pressure and fluid volume within ordered parameters to optimize cerebral perfusion and minimize risk of hemorrhage  - Monitor temperature, glucose, and sodium. Initiate appropriate interventions as ordered  Outcome: Not Progressing  Goal: Achieves maximal functionality and self care  Description: INTERVENTIONS  - Monitor swallowing and airway patency with patient fatigue and changes in neurological status  - Encourage and assist patient to increase activity and self care with guidance from PT/OT  - Encourage visually impaired, hearing impaired and aphasic patients to use assistive/communication devices  Outcome: Not Progressing     Problem: Impaired Cognition  Goal: Patient will exhibit improved attention, thought processing and/or memory  Description: Interventions:  Outcome: Not Progressing

## 2025-03-01 NOTE — PLAN OF CARE
Patient is lethargic today. On room air. VSS. IVFs initiated per family request. Dilaudid for pain. Restless throughout the day. Able to swallow medications crushed in applesauce. Pleurex dressing intact. Family at the bedside throughout the day. Frequent rounding. Call light within reach; safety precautions in place.     Problem: Patient Centered Care  Goal: Patient preferences are identified and integrated in the patient's plan of care  Description: Interventions:  - What would you like us to know as we care for you? \"My family helps take care of me.\"  - Provide timely, complete, and accurate information to patient/family  - Incorporate patient and family knowledge, values, beliefs, and cultural backgrounds into the planning and delivery of care  - Encourage patient/family to participate in care and decision-making at the level they choose  - Honor patient and family perspectives and choices  Outcome: Progressing     Problem: Patient/Family Goals  Goal: Patient/Family Long Term Goal  Description: Patient's Long Term Goal: Feel better    Interventions:  - Educate on taking medications as prescribed  - Monitor vitals  -Up as tolerated  - See additional Care Plan goals for specific interventions  Outcome: Progressing  Goal: Patient/Family Short Term Goal  Description: Patient's Short Term Goal: No syncopal episodes    Interventions:   - EKG  - EEG  - Neurology consult  - Monitor vitals  - MRI  - See additional Care Plan goals for specific interventions  Outcome: Progressing     Problem: NEUROLOGICAL - ADULT  Goal: Achieves stable or improved neurological status  Description: INTERVENTIONS  - Assess for and report changes in neurological status  - Initiate measures to prevent increased intracranial pressure  - Maintain blood pressure and fluid volume within ordered parameters to optimize cerebral perfusion and minimize risk of hemorrhage  - Monitor temperature, glucose, and sodium. Initiate appropriate interventions  as ordered  Outcome: Progressing  Goal: Absence of seizures  Description: INTERVENTIONS  - Monitor for seizure activity  - Administer anti-seizure medications as ordered  - Monitor neurological status  Outcome: Progressing  Goal: Remains free of injury related to seizure activity  Description: INTERVENTIONS:  - Maintain airway, patient safety  and administer oxygen as ordered  - Monitor patient for seizure activity, document and report duration and description of seizure to MD/LIP  - If seizure occurs, turn patient to side and suction secretions as needed  - Reorient patient post seizure  - Seizure pads on all 4 side rails  - Instruct patient/family to notify RN of any seizure activity  - Instruct patient/family to call for assistance with activity based on assessment  Outcome: Progressing  Goal: Achieves maximal functionality and self care  Description: INTERVENTIONS  - Monitor swallowing and airway patency with patient fatigue and changes in neurological status  - Encourage and assist patient to increase activity and self care with guidance from PT/OT  - Encourage visually impaired, hearing impaired and aphasic patients to use assistive/communication devices  Outcome: Progressing     Problem: Impaired Cognition  Goal: Patient will exhibit improved attention, thought processing and/or memory  Description: Interventions:    Outcome: Progressing     Problem: PAIN - ADULT  Goal: Verbalizes/displays adequate comfort level or patient's stated pain goal  Description: INTERVENTIONS:  - Encourage pt to monitor pain and request assistance  - Assess pain using appropriate pain scale  - Administer analgesics based on type and severity of pain and evaluate response  - Implement non-pharmacological measures as appropriate and evaluate response  - Consider cultural and social influences on pain and pain management  - Manage/alleviate anxiety  - Utilize distraction and/or relaxation techniques  - Monitor for opioid side  effects  - Notify MD/LIP if interventions unsuccessful or patient reports new pain  - Anticipate increased pain with activity and pre-medicate as appropriate  Outcome: Progressing     Problem: SAFETY ADULT - FALL  Goal: Free from fall injury  Description: INTERVENTIONS:  - Assess pt frequently for physical needs  - Identify cognitive and physical deficits and behaviors that affect risk of falls.  - [place] fall precautions as indicated by assessment.  - Educate pt/family on patient safety including physical limitations  - Instruct pt to call for assistance with activity based on assessment  - Modify environment to reduce risk of injury  - Provide assistive devices as appropriate  - Consider OT/PT consult to assist with strengthening/mobility  - Encourage toileting schedule  Outcome: Progressing     Problem: DISCHARGE PLANNING  Goal: Discharge to home or other facility with appropriate resources  Description: INTERVENTIONS:  - Identify barriers to discharge w/pt and caregiver  - Include patient/family/discharge partner in discharge planning  - Arrange for needed discharge resources and transportation as appropriate  - Identify discharge learning needs (meds, wound care, etc)  - Arrange for interpreters to assist at discharge as needed  - Consider post-discharge preferences of patient/family/discharge partner  - Complete POLST form as appropriate  - Assess patient's ability to be responsible for managing their own health  - Refer to Case Management Department for coordinating discharge planning if the patient needs post-hospital services based on physician/LIP order or complex needs related to functional status, cognitive ability or social support system  Outcome: Progressing

## 2025-03-02 ENCOUNTER — APPOINTMENT (OUTPATIENT)
Dept: GENERAL RADIOLOGY | Facility: HOSPITAL | Age: 75
End: 2025-03-02
Attending: HOSPITALIST
Payer: MEDICARE

## 2025-03-02 LAB
NT-PROBNP SERPL-MCNC: 565 PG/ML (ref ?–450)
POTASSIUM SERPL-SCNC: 4 MMOL/L (ref 3.5–5.1)

## 2025-03-02 PROCEDURE — 71045 X-RAY EXAM CHEST 1 VIEW: CPT | Performed by: HOSPITALIST

## 2025-03-02 NOTE — PLAN OF CARE
Leti can answer all orientation questions, but is lethargic with moments of alertness, she is more alert today than she has been in the past. She is tolerating small bites of food she likes. Patient requested tylenol for a headache. Pure wick in place. Two loose stools today. General diet with nectar thickened liquids. Taking medications crushed with pudding. Chemo medication given. Family at bedside. Call light within reach.  Problem: Patient Centered Care  Goal: Patient preferences are identified and integrated in the patient's plan of care  Description: Interventions:  - What would you like us to know as we care for you? \"My family helps take care of me.\"  - Provide timely, complete, and accurate information to patient/family  - Incorporate patient and family knowledge, values, beliefs, and cultural backgrounds into the planning and delivery of care  - Encourage patient/family to participate in care and decision-making at the level they choose  - Honor patient and family perspectives and choices  Outcome: Progressing     Problem: Patient/Family Goals  Goal: Patient/Family Short Term Goal  Description: Patient's Short Term Goal: No syncopal episodes    Interventions:   - EKG  - EEG  - Neurology consult  - Monitor vitals  - MRI  - See additional Care Plan goals for specific interventions  Outcome: Progressing     Problem: NEUROLOGICAL - ADULT  Goal: Absence of seizures  Description: INTERVENTIONS  - Monitor for seizure activity  - Administer anti-seizure medications as ordered  - Monitor neurological status  Outcome: Progressing  Goal: Remains free of injury related to seizure activity  Description: INTERVENTIONS:  - Maintain airway, patient safety  and administer oxygen as ordered  - Monitor patient for seizure activity, document and report duration and description of seizure to MD/LIP  - If seizure occurs, turn patient to side and suction secretions as needed  - Reorient patient post seizure  - Seizure pads on  all 4 side rails  - Instruct patient/family to notify RN of any seizure activity  - Instruct patient/family to call for assistance with activity based on assessment  Outcome: Progressing     Problem: Impaired Cognition  Goal: Patient will exhibit improved attention, thought processing and/or memory  Description: Interventions:  Outcome: Progressing     Problem: PAIN - ADULT  Goal: Verbalizes/displays adequate comfort level or patient's stated pain goal  Description: INTERVENTIONS:  - Encourage pt to monitor pain and request assistance  - Assess pain using appropriate pain scale  - Administer analgesics based on type and severity of pain and evaluate response  - Implement non-pharmacological measures as appropriate and evaluate response  - Consider cultural and social influences on pain and pain management  - Manage/alleviate anxiety  - Utilize distraction and/or relaxation techniques  - Monitor for opioid side effects  - Notify MD/LIP if interventions unsuccessful or patient reports new pain  - Anticipate increased pain with activity and pre-medicate as appropriate  Outcome: Progressing     Problem: SAFETY ADULT - FALL  Goal: Free from fall injury  Description: INTERVENTIONS:  - Assess pt frequently for physical needs  - Identify cognitive and physical deficits and behaviors that affect risk of falls.  - [place] fall precautions as indicated by assessment.  - Educate pt/family on patient safety including physical limitations  - Instruct pt to call for assistance with activity based on assessment  - Modify environment to reduce risk of injury  - Provide assistive devices as appropriate  - Consider OT/PT consult to assist with strengthening/mobility  - Encourage toileting schedule  Outcome: Progressing     Problem: DISCHARGE PLANNING  Goal: Discharge to home or other facility with appropriate resources  Description: INTERVENTIONS:  - Identify barriers to discharge w/pt and caregiver  - Include  patient/family/discharge partner in discharge planning  - Arrange for needed discharge resources and transportation as appropriate  - Identify discharge learning needs (meds, wound care, etc)  - Arrange for interpreters to assist at discharge as needed  - Consider post-discharge preferences of patient/family/discharge partner  - Complete POLST form as appropriate  - Assess patient's ability to be responsible for managing their own health  - Refer to Case Management Department for coordinating discharge planning if the patient needs post-hospital services based on physician/LIP order or complex needs related to functional status, cognitive ability or social support system  Outcome: Progressing     Problem: Patient/Family Goals  Goal: Patient/Family Long Term Goal  Description: Patient's Long Term Goal: Feel better    Interventions:  - Educate on taking medications as prescribed  - Monitor vitals  -Up as tolerated  - See additional Care Plan goals for specific interventions  Outcome: Not Progressing     Problem: NEUROLOGICAL - ADULT  Goal: Achieves stable or improved neurological status  Description: INTERVENTIONS  - Assess for and report changes in neurological status  - Initiate measures to prevent increased intracranial pressure  - Maintain blood pressure and fluid volume within ordered parameters to optimize cerebral perfusion and minimize risk of hemorrhage  - Monitor temperature, glucose, and sodium. Initiate appropriate interventions as ordered  Outcome: Not Progressing  Goal: Achieves maximal functionality and self care  Description: INTERVENTIONS  - Monitor swallowing and airway patency with patient fatigue and changes in neurological status  - Encourage and assist patient to increase activity and self care with guidance from PT/OT  - Encourage visually impaired, hearing impaired and aphasic patients to use assistive/communication devices  Outcome: Not Progressing

## 2025-03-02 NOTE — PLAN OF CARE
Problem: Patient Centered Care  Goal: Patient preferences are identified and integrated in the patient's plan of care  Description: Interventions:  - What would you like us to know as we care for you? \"My family helps take care of me.\"  - Provide timely, complete, and accurate information to patient/family  - Incorporate patient and family knowledge, values, beliefs, and cultural backgrounds into the planning and delivery of care  - Encourage patient/family to participate in care and decision-making at the level they choose  - Honor patient and family perspectives and choices  Outcome: Progressing     Problem: Patient/Family Goals  Goal: Patient/Family Long Term Goal  Description: Patient's Long Term Goal: Feel better    Interventions:  - Educate on taking medications as prescribed  - Monitor vitals  -Up as tolerated  - See additional Care Plan goals for specific interventions  Outcome: Progressing  Goal: Patient/Family Short Term Goal  Description: Patient's Short Term Goal: No syncopal episodes    Interventions:   - EKG  - EEG  - Neurology consult  - Monitor vitals  - MRI  - See additional Care Plan goals for specific interventions  Outcome: Progressing     Problem: NEUROLOGICAL - ADULT  Goal: Achieves stable or improved neurological status  Description: INTERVENTIONS  - Assess for and report changes in neurological status  - Initiate measures to prevent increased intracranial pressure  - Maintain blood pressure and fluid volume within ordered parameters to optimize cerebral perfusion and minimize risk of hemorrhage  - Monitor temperature, glucose, and sodium. Initiate appropriate interventions as ordered  Outcome: Progressing  Goal: Absence of seizures  Description: INTERVENTIONS  - Monitor for seizure activity  - Administer anti-seizure medications as ordered  - Monitor neurological status  Outcome: Progressing  Goal: Remains free of injury related to seizure activity  Description: INTERVENTIONS:  - Maintain  airway, patient safety  and administer oxygen as ordered  - Monitor patient for seizure activity, document and report duration and description of seizure to MD/LIP  - If seizure occurs, turn patient to side and suction secretions as needed  - Reorient patient post seizure  - Seizure pads on all 4 side rails  - Instruct patient/family to notify RN of any seizure activity  - Instruct patient/family to call for assistance with activity based on assessment  Outcome: Progressing  Goal: Achieves maximal functionality and self care  Description: INTERVENTIONS  - Monitor swallowing and airway patency with patient fatigue and changes in neurological status  - Encourage and assist patient to increase activity and self care with guidance from PT/OT  - Encourage visually impaired, hearing impaired and aphasic patients to use assistive/communication devices  Outcome: Progressing     Problem: Impaired Cognition  Goal: Patient will exhibit improved attention, thought processing and/or memory  Description: Interventions:    Outcome: Progressing     Problem: PAIN - ADULT  Goal: Verbalizes/displays adequate comfort level or patient's stated pain goal  Description: INTERVENTIONS:  - Encourage pt to monitor pain and request assistance  - Assess pain using appropriate pain scale  - Administer analgesics based on type and severity of pain and evaluate response  - Implement non-pharmacological measures as appropriate and evaluate response  - Consider cultural and social influences on pain and pain management  - Manage/alleviate anxiety  - Utilize distraction and/or relaxation techniques  - Monitor for opioid side effects  - Notify MD/LIP if interventions unsuccessful or patient reports new pain  - Anticipate increased pain with activity and pre-medicate as appropriate  Outcome: Progressing     Problem: SAFETY ADULT - FALL  Goal: Free from fall injury  Description: INTERVENTIONS:  - Assess pt frequently for physical needs  - Identify  cognitive and physical deficits and behaviors that affect risk of falls.  - [place] fall precautions as indicated by assessment.  - Educate pt/family on patient safety including physical limitations  - Instruct pt to call for assistance with activity based on assessment  - Modify environment to reduce risk of injury  - Provide assistive devices as appropriate  - Consider OT/PT consult to assist with strengthening/mobility  - Encourage toileting schedule  Outcome: Progressing     Problem: DISCHARGE PLANNING  Goal: Discharge to home or other facility with appropriate resources  Description: INTERVENTIONS:  - Identify barriers to discharge w/pt and caregiver  - Include patient/family/discharge partner in discharge planning  - Arrange for needed discharge resources and transportation as appropriate  - Identify discharge learning needs (meds, wound care, etc)  - Arrange for interpreters to assist at discharge as needed  - Consider post-discharge preferences of patient/family/discharge partner  - Complete POLST form as appropriate  - Assess patient's ability to be responsible for managing their own health  - Refer to Case Management Department for coordinating discharge planning if the patient needs post-hospital services based on physician/LIP order or complex needs related to functional status, cognitive ability or social support system  Outcome: Progressing   No acute changes. Patient is lethargic, restless at times. Vital signs stable. Denies pain. IV Keppra and Decadron continued. Iv fluids infusing. Purewick in place. No BM overnight. Daughter at the bedside. Plan of care discussed.

## 2025-03-02 NOTE — PROGRESS NOTES
[facility] Hospitalist Progress Note     CC: Hospital Follow up    PCP: Shruthi Simon MD       Assessment/Plan:     Principal Problem:    Delirium, acute  Active Problems:    Dehydration    Primary malignant neoplasm of lung metastatic to other site, unspecified laterality (HCC)    Intracranial hemorrhage (HCC)    Acute hemorrhagic infarction of brain (HCC)    Palliative care by specialist      Ms. 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         L sided weakness  New right paramedian midbrain metastatic lesion  Syncope  - noted on CT head  - hold Eliquis, ok to restart Eliquis per per NSGY  - MRI brain without bleeding noted  - NSGY and neurology consulted  - continue keppra  - stop neurochecks  - PT/OT/SW  - per neurology, 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 Monzon from [facility] he is awaiting approval of an oral immunotherapy that may have some benefit, but not curative,  -palliative care eval  -trial of decadron, was agitated 2/27 palliative stopped provigil  -2/28 appears worse was agitated last night, sleepy this morning after IV pain meds  -continue to monitor, more confused and lethargic  - hospice appropriate     Lung Ca with recurrent malignant pleural effusion  PE hx   Small R PTX - POA   Chronic hypoxic respiratory failure on home O2  - S/p R sided PleurX placement 1/31/25  - drain scheduled MWF 9:30AM, up to 1L  - Already has 2L O2 at home - at baseline now      Lung cancer with brain mets  - follow-up oncology as outpatient  - planned to start chemotherapy this week    - per family, had recent MRI brain a few weeks ago that showed decrease in size of brain mets  - has been on prednisone taper since November, currently on 10 mg daily-> now on decadron  - tegresso was ordered, Dr. Monzon from  wants her to start this, medication has been approved will start 2/27 per Dr. Monzon with oncology -> spit out medication on 2/27, was able to take 2/28 and 3/1 in pudding    DVT/PE  - restart Eliquis   - 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    Hypothyroidism  - Continue synthroid     ACP  - CODE- DNR/select- updated today with POA  - POA- son Brayan updated at bedside   - lives at home with son     FN:  - IVF: stopped IVF  - Diet: regular     DVT Prophy: SCD, eliquis   Lines: port     Dispo: pending course; family discussing hospice pending response with [facility]    Discussed with multiple family members at bedside.      Outpatient records or previous hospital records reviewed.      Further recommendations pending patient's clinical course.  [facility] hospitalist to continue to follow patient while in house     Patient and/or patient's family given opportunity to ask questions and note understanding and agreeing with therapeutic plan as outlined    Vanessa Elam MD  [facility]  Answering Service number: [phone]     Subjective:     More alert today. Had a severe coughing fit this AM.     OBJECTIVE:    Blood pressure 133/86, pulse 73, temperature 96.9 °F (36.1 °C), temperature source Axillary, resp. rate 26, height 5' 3\" (1.6 m), weight 128 lb (58.1 kg), SpO2 97%, not currently breastfeeding.    Temp:  [96.9 °F (36.1 °C)] 96.9 °F (36.1 °C)  Pulse:  [73-79] 73  Resp:  [17-26] 26  BP: (133-156)/(86-96) 133/86  SpO2:  [95 %-97 %] 97 %      Intake/Output:    Intake/Output Summary (Last 24 hours) at 3/2/2025 1359  Last data filed at 3/2/2025 0559  Gross per 24 hour    Intake 550 ml   Output 300 ml   Net 250 ml       Last 3 Weights   02/25/25 0507 128 lb (58.1 kg)   02/21/25 1200 126 lb 8.7 oz (57.4 kg)   02/20/25 2050 128 lb 1.4 oz (58.1 kg)   02/20/25 1146 162 lb 0.6 oz (73.5 kg)   03/23/22 0942 162 lb (73.5 kg)   01/12/22 1248 160 lb (72.6 kg)       Exam  GEN: more awake today, answers some questions, soft voice and follows commands  HEENT: EOMI  Pulm: decreased breath sounds R side, no increased work of breathing  CV: RRR, no murmurs  ABD: Soft, non-tender, non-distended, +BS  Neuro: LUE/LLE weakness, follows commands      Data Review:       Labs:     Recent Labs   Lab 02/25/25 0445 02/26/25 0438 03/01/25  0432   RBC 3.13* 3.09* 3.00*   HGB 10.7* 11.0* 10.2*   HCT 30.6* 30.4* 28.9*   MCV 97.8 98.4 96.3   MCH 34.2* 35.6* 34.0   MCHC 35.0 36.2 35.3   RDW 13.0 12.8 12.6   NEPRELIM  --  3.83 3.91   WBC 4.7 4.6 4.7   .0* 160.0 188.0         Recent Labs   Lab 02/25/25 0445 02/26/25 0438 03/01/25  0433 03/02/25  0649   GLU 97 147* 143*  --    BUN 12 16 24*  --    CREATSERUM 0.55 0.55 0.55  --    EGFRCR 96 96 96  --    CA 7.8* 8.1* 7.8*  --     142 145  --    K 3.6  3.6 4.3  4.3 3.7 4.0    110 113*  --    CO2 23.0 22.0 22.0  --        No results for input(s): \"ALT\", \"AST\", \"ALB\", \"AMYLASE\", \"LIPASE\", \"LDH\" in the last 168 hours.    Invalid input(s): \"ALPHOS\", \"TBIL\", \"DBIL\", \"TPROT\"      Imaging:  XR CHEST AP PORTABLE  (CPT=71045)    Result Date: 3/2/2025  CONCLUSION:   Large right pleural effusion with associated multifocal opacities in right lung, which may reflect atelectasis, pneumonitis, pulmonary edema, or pneumonia.  Well-positioned right pleural drainage catheter and right chest wall port.     Dictated by (CST): Matt Campuzano MD on 3/02/2025 at 11:22 AM     Finalized by (CST): Matt Campuzano MD on 3/02/2025 at 11:24 AM             Meds:     INPATIENT MEDICATIONS    Scheduled Medications:      Osimertinib Mesylate, 80 mg, Daily  dexamethasone, 4  mg, TID  apixaban, 5 mg, BID  famotidine, 20 mg, Daily   Or  famotidine, 20 mg, Daily  levothyroxine, 50 mcg, Daily  levETIRAcetam, 500 mg, Q12H            Drips:           PRN Medications  guaiFENesin, 100 mg, Q4H PRN  acetaminophen, 650 mg, Q4H PRN   Or  acetaminophen, 650 mg, Q4H PRN  labetalol, 10 mg, Q10 Min PRN  hydrALAzine, 10 mg, Q2H PRN  ondansetron, 4 mg, Q6H PRN   Or  metoclopramide, 10 mg, Q8H PRN  albuterol, 1 puff, Q6H PRN

## 2025-03-03 LAB
ANION GAP SERPL CALC-SCNC: 9 MMOL/L (ref 0–18)
BUN BLD-MCNC: 21 MG/DL (ref 9–23)
BUN/CREAT SERPL: 40.4 (ref 10–20)
CALCIUM BLD-MCNC: 8 MG/DL (ref 8.7–10.4)
CHLORIDE SERPL-SCNC: 112 MMOL/L (ref 98–112)
CO2 SERPL-SCNC: 21 MMOL/L (ref 21–32)
CREAT BLD-MCNC: 0.52 MG/DL
DEPRECATED RDW RBC AUTO: 44.5 FL (ref 35.1–46.3)
EGFRCR SERPLBLD CKD-EPI 2021: 97 ML/MIN/1.73M2 (ref 60–?)
ERYTHROCYTE [DISTWIDTH] IN BLOOD BY AUTOMATED COUNT: 12.9 % (ref 11–15)
GLUCOSE BLD-MCNC: 118 MG/DL (ref 70–99)
HCT VFR BLD AUTO: 28.9 %
HGB BLD-MCNC: 10.6 G/DL
MCH RBC QN AUTO: 35.3 PG (ref 26–34)
MCHC RBC AUTO-ENTMCNC: 36.7 G/DL (ref 31–37)
MCV RBC AUTO: 96.3 FL
OSMOLALITY SERPL CALC.SUM OF ELEC: 298 MOSM/KG (ref 275–295)
PLATELET # BLD AUTO: 131 10(3)UL (ref 150–450)
POTASSIUM SERPL-SCNC: 3.8 MMOL/L (ref 3.5–5.1)
RBC # BLD AUTO: 3 X10(6)UL
SODIUM SERPL-SCNC: 142 MMOL/L (ref 136–145)
WBC # BLD AUTO: 5.5 X10(3) UL (ref 4–11)

## 2025-03-03 PROCEDURE — 99233 SBSQ HOSP IP/OBS HIGH 50: CPT | Performed by: INTERNAL MEDICINE

## 2025-03-03 RX ORDER — POTASSIUM CHLORIDE 14.9 MG/ML
20 INJECTION INTRAVENOUS ONCE
Status: COMPLETED | OUTPATIENT
Start: 2025-03-03 | End: 2025-03-03

## 2025-03-03 RX ORDER — LOPERAMIDE HYDROCHLORIDE 2 MG/1
2 CAPSULE ORAL EVERY 12 HOURS
Status: DISCONTINUED | OUTPATIENT
Start: 2025-03-03 | End: 2025-03-05

## 2025-03-03 NOTE — CM/SW NOTE
Cm met with POC son Brayan re dc plan. He still is anticipating Jessa and req ref to [facility] and [facility].     Cm informed son that [facility] is closed for covid outbreak and ref will be sent to [facility].     List provided and requested top 3 back up choices.    Ins auth good thru 3/4 at midnight.    Plan  JESSA pending choice    / to remain available for support and/or discharge planning.     Parris Vergara RN    Ext [phone]

## 2025-03-03 NOTE — PROGRESS NOTES
Palliative Care Progress Note    Damari Jonesnon Patient Status:  Inpatient    1950 MRN [id number]   Location [facility] 4W/SW/SE Attending Jose Carlos Mejia MD   Hosp Day # 11 PCP Shruthi Simon MD     Date of Consult: 2025  Patient seen at: [facility] In[facility]    Reason for Consultation: Consult requested for goals of care and care coordination.    Subjective     S: More awake/alert. Eating well, surrounded by family.     Review of Systems: Palliative Care symptom needs assessed:     Unable to accurately obtain due to altered mental status     Palliative Care Social History:   Marital Status:    Children: Yes  Living Situation Prior to Admit: Home  Occupational History: Retired  Personal:     Spiritual  Damari Hong DANIEL    requested: No    Medical History: obtained from [facility]  Past Medical History:    Depression    off Cymbalta    Esophageal reflux    HYPERLIPIDEMIA    Lung cancer (HCC)    with brain mets    OSTEOARTHRITIS    OTHER DISEASES    Hashimoto's--sees Dr. Palmer--Endo    OTHER DISEASES    DEXA-WNL     Reflux    Thyroid disease     Past Surgical History:   Procedure Laterality Date    Appendectomy      Colonoscopy      -Dr. Rodriges--colonoscopy-normal--next 2018    Hysterectomy      partial hysterectomy-still with bilateral ovaries--due to proloapsed uterus--PAP's always normal    Knee replacement surgery      L-knee TKP-Dr. Carney '    Laparoscopic  2014    Procedure: LAPAROSCOPIC CHOLECYSTECTOMY WITH  CHOLANGIOGRAM   POSS OPEN;  Surgeon: Monik Simon MD;  Location: [facility]    Other surgical history      Arthroscopy    Other surgical history      both knee replacement    Pleurx catheter         Family History: obtained from [facility]  Family History   Problem Relation Age of Onset    Cancer Father         EtOH, Liver Dz    Cancer Mother         colon cancer and Alzheimer;s-diagnosed 68       Allergies:  Allergies[1]    Medications:      Current Facility-Administered Medications:     potassium chloride 20 mEq/100mL IVPB premix 20 mEq, 20 mEq, Intravenous, Once    guaiFENesin (Robitussin) 100 MG/5 ML oral liquid 100 mg, 100 mg, Oral, Q4H PRN    Osimertinib Mesylate (Tagrisso) TABS 80 mg - PATIENTS OWN MEDICATION, 80 mg, Oral, Daily    dexamethasone (Decadron) 4 MG/ML injection 4 mg, 4 mg, Intravenous, TID    apixaban (Eliquis) tab 5 mg, 5 mg, Oral, BID    acetaminophen (Tylenol) tab 650 mg, 650 mg, Oral, Q4H PRN **OR** acetaminophen (Tylenol) rectal suppository 650 mg, 650 mg, Rectal, Q4H PRN    labetalol (Trandate) 5 mg/mL injection 10 mg, 10 mg, Intravenous, Q10 Min PRN    hydrALAzine (Apresoline) 20 mg/mL injection 10 mg, 10 mg, Intravenous, Q2H PRN    famotidine (Pepcid) tab 20 mg, 20 mg, Oral, Daily **OR** famotidine (Pepcid) 20 mg/2mL injection 20 mg, 20 mg, Intravenous, Daily    ondansetron (Zofran) 4 MG/2ML injection 4 mg, 4 mg, Intravenous, Q6H PRN **OR** metoclopramide (Reglan) 5 mg/mL injection 10 mg, 10 mg, Intravenous, Q8H PRN    albuterol (Ventolin HFA) 108 (90 Base) MCG/ACT inhaler 1 puff, 1 puff, Inhalation, Q6H PRN    levothyroxine (Synthroid) tab 50 mcg, 50 mcg, Oral, Daily    levETIRAcetam (Keppra) 500 mg/5mL injection 500 mg, 500 mg, Intravenous, Q12H  No current outpatient medications on file.         Palliative Performance Scale: 10 %  % Ambulation Activity Level Self-Care Intake Consciousness   100 Full  Normal  No Disease Full Normal Full   90 Full  Normal  Some Disease Full Normal Full   80 Full  Normal w/effort  Some Disease Full Normal or reduced Full   70 Reduced  Can't Perform Job  Some Disease Full Normal or reduced Full   60 Reduced  Can't Perform Hobby   Significant Disease Occ Assist Normal or reduced Full or confused   50 Mainly sit/lie Can't do any work  Extensive Disease Partial Assist Normal or reduced Full or confused   40 Mainly in bed Can't do any work  Extensive Disease Mainly Assist Normal or reduced Full  or confused   30 Bed Bound Can't do any work  Extensive Disease Max Assist  Total Care Reduced  Drowsy/confused   20 Bed Bound Can't do any work  Extensive Disease Max Assist  Total Care Minimal  Drowsy/confused   10 Bed Bound Can't do any work  Extensive Disease Max Assist  Total Care Mouth Care  Drowsy/confused   0 Death        Objective      Vital Signs:  Blood pressure 130/78, pulse 72, temperature 98 °F (36.7 °C), temperature source Axillary, resp. rate 19, height 5' 3\" (1.6 m), weight 128 lb (58.1 kg), SpO2 96%, not currently breastfeeding.  Body mass index is 22.67 kg/m².  Non-verbal signs of pain present: Yes    Physical Exam:  General: Awake, comfortable  HEENT: MMM.   Cardiac: RRR  Lungs: Normal effort on RA  Abdomen: Soft, non-distended  Extremities:  no  Edema present  Skin: Warm and dry.    Hematology:  Lab Results   Component Value Date    WBC 5.5 03/03/2025    HGB 10.6 (L) 03/03/2025    HCT 28.9 (L) 03/03/2025    .0 (L) 03/03/2025       Coags:  Lab Results   Component Value Date    INR 1.0 09/21/2010       Chemistry:  Lab Results   Component Value Date    CREATSERUM 0.52 (L) 03/03/2025    BUN 21 03/03/2025     03/03/2025    K 3.8 03/03/2025     03/03/2025    CO2 21.0 03/03/2025     (H) 03/03/2025    CA 8.0 (L) 03/03/2025    ALB 4.8 03/23/2022    ALKPHO 106 03/23/2022    BILT 0.52 03/23/2022    TP 7.4 03/23/2022    AST 22 03/23/2022    ALT 19 03/23/2022    MG 2.0 03/01/2025       Imaging:  XR CHEST AP PORTABLE  (CPT=71045)    Result Date: 3/2/2025  CONCLUSION:   Large right pleural effusion with associated multifocal opacities in right lung, which may reflect atelectasis, pneumonitis, pulmonary edema, or pneumonia.  Well-positioned right pleural drainage catheter and right chest wall port.     Dictated by (CST): Matt Campuzano MD on 3/02/2025 at 11:22 AM     Finalized by (CST): Matt Campuzano MD on 3/02/2025 at 11:24 AM           Assessment and Recommendations         Delirium, acute    Dehydration    Primary malignant neoplasm of lung metastatic to other site, unspecified laterality (HCC)    Intracranial hemorrhage (HCC)    Acute hemorrhagic infarction of brain (HCC)    Symptom Management      Pain:  -has not traditionally had pain  -there is some brow-furrowing, indicating discomfort  -tylenol for headache, can consider fioricet    Agitation  -resolving    Dyspnea:  -on room air now, previously on home 02  -due for pleurex drainage    Cachexia:  -previously on megace  -has had severely diminished caloric intake over last several weeks, sons estimate maybe 2-300 calories    Somnolence  -will stop prednisone in favor of decadron  -4mg IV TID    Diarrhea  -side effect of tigresso   -schedule imodium 2mg BID   -increase as needed     Prevention of Constipation:    - Recommend Dulcolax suppository daily PRN    2.     Advanced Care Planning  A voluntarily discussion and explanation regarding Advance Care Planning (ACP) took place today with patient and son. We discussed the risks vs benefits of life sustaining treatments in the setting of Damari Hong's comorbid medical conditions. Items addressed: patient preferences , code status , end-of-life care plan, and general prognosis . This resulted in a better understanding of ACP documentation , a better understanding of pt's expressed wishes/preferences , confirmation of code status , and family discussions to continue privately .   Summary:     Total time dedicated to ACP >16 minutes.       3.     Serious Illness Conversation:   Code Status: DNAR  Met with Damari and sons Romario and Brayan. Unfortunately at the time of my meeting she appears obtunded and minimally arousable. She will nod and voice answers to simple questions but doesn't open her eyes.   I spoke with her outside oncologist Dr. Monzon who is in contact with her sons. We will plan to try to optimize her performance, however if her mental status does not improve, we may  plan to pivot to comfort care/hospice.  Leti is more somnolent and restless. She is oriented upon arousal. I explained to Brayan that everything we do at this point to try to optimize her is sort of a hail ronni. We can treat restlessness, but it works against her alertness/arousal that we want for her to continue to rehab/cancer treatment.   I personally feel that the family should bring her home with hospice services and I posed this question to him: 'if she has a prognosis of short weeks, how do you want her cared for?' He said he will consider this question with siblings.   Plan continues to be SHAR/SNF with oral anti-tumor agent from her outpatient oncologist.   2/28 Family is vigilant for any effect of the antitumor agent. Unfortunately her cognition seems to be declining or stably poor.  Dr. Sanchezed to speak to them this afternoon.  She meets inpatient hospice criteria.   3/3 very pleasant improvement today, still advising ongoing care planning with adult children. Will message her OP oncologist.     Palliative Care Follow Up: Palliative care team will Continue to follow while inpatient    Thank you for allowing Palliative Care services to participate in the care of Damari Hong.    Medical Decision Making (MDM) for Palliative Care   Problems Addressed:   Details: Cancer with brain mets  Cancer related fatigue  Malnourishment  Data Reviewed & Analyzed:  External notes reviewed from each unique source:    Results reviewed from each unique test:    Unique tests ordered:    Select all that apply:    Details:    Risk of Complications from DIagnostic Testing & Treatment:   Details: - Drug therapy requiring intensive monitoring for toxicity    - Decision regarding hospitalization or escalation of hospital level of care   - Decision not to resuscitate or to de-escalate care because of poor prognosis   - Parenteral controlled substances      Calculated MDM Level: High Richard Fox MD  Palliative Care  Services  [facility] [phone]    Note to patient:  The 21st Century Cures Act makes medical notes like these available to patients in the interest of transparency. However, be advised this is a medical document. It is intended as peer to peer communication. It is written in medical language and may contain abbreviations or verbiage that are unfamiliar. It may appear blunt or direct. Medical documents are intended to carry relevant information, facts as evident, and the clinical opinion of the practitioner.           [1] No Known Allergies

## 2025-03-03 NOTE — PHYSICAL THERAPY NOTE
PHYSICAL THERAPY TREATMENT NOTE - INPATIENT     Room Number: 462/462-A       Presenting Problem: delerium, malginant lung ca with brain mets  Co-Morbidities : lung CA with brain mets    Problem List  Principal Problem:    Delirium, acute  Active Problems:    Dehydration    Primary malignant neoplasm of lung metastatic to other site, unspecified laterality (HCC)    Intracranial hemorrhage (HCC)    Acute hemorrhagic infarction of brain (HCC)    Palliative care by specialist      PHYSICAL THERAPY ASSESSMENT   Patient demonstrates fair progress this session, goals  remain in progress.      Patient is requiring moderate assist, maximum assist, and dependent as a result of the following impairments: decreased functional strength, decreased endurance/aerobic capacity, impaired sitting and standing balance, impaired motor planning, decreased muscular endurance, cognitive deficits (increased time to follow commands, A&O x 1), and medical status.     Patient continues to function below baseline with bed mobility, transfers, gait, stair negotiation, maintaining seated position, standing prolonged periods, and performing household tasks.  Next session anticipate patient to progress bed mobility, transfers, maintaining seated position, and standing prolonged periods.  Physical Therapy will continue to follow patient for duration of hospitalization.    Patient continues to benefit from continued skilled PT services: to promote return to prior level of function and safety with continuous assistance and gradual rehabilitative therapy .    PLAN DURING HOSPITALIZATION  Nursing Mobility Recommendation : Lift Equipment  PT Device Recommendation: Rolling walker;Mechanical lift  PT Treatment Plan: Bed mobility;Body mechanics;Endurance;Energy conservation;Patient education;Range of motion;Strengthening;Transfer training;Balance training  Frequency (Obs): 3-5x/week     SUBJECTIVE  \"Thank you\"    OBJECTIVE  Precautions: Limb alert -  left;Seizure;Bed/chair alarm    WEIGHT BEARING RESTRICTION       PAIN ASSESSMENT   Ratin  Location: denied pain  Management Techniques: Activity promotion;Repositioning    BALANCE  Static Sitting: Poor +  Dynamic Sitting: Poor  Static Standing: Not tested  Dynamic Standing: Not applicable    ACTIVITY TOLERANCE  Pulse: 103  Heart Rate Source: Monitor                    O2 WALK  Oxygen Therapy  SPO2% on Room Air at Rest: 94    AM-PAC '6-Clicks' INPATIENT SHORT FORM - BASIC MOBILITY  How much difficulty does the patient currently have...  Patient Difficulty: Turning over in bed (including adjusting bedclothes, sheets and blankets)?: A Lot   Patient Difficulty: Sitting down on and standing up from a chair with arms (e.g., wheelchair, bedside commode, etc.): Unable   Patient Difficulty: Moving from lying on back to sitting on the side of the bed?: A Lot   How much help from another person does the patient currently need...   Help from Another: Moving to and from a bed to a chair (including a wheelchair)?: Total   Help from Another: Need to walk in hospital room?: Total   Help from Another: Climbing 3-5 steps with a railing?: Total     AM-PAC Score:  Raw Score: 8   Approx Degree of Impairment: 86.62%   Standardized Score (AM-PAC Scale): 28.58   CMS Modifier (G-Code): CM    FUNCTIONAL ABILITY STATUS  Functional Mobility/Gait Assessment  Gait Assistance: Not tested  Supine to Sit: moderate assist and assist x 2. Assist with LE's and trunk. Sat EOB for 9 minutes requiring Mod A to maintain static sitting balance. Assist with positioning of Ue's EOB for support. Posterior, L lateral lean- cues and assist to correct. Participated in gently anterior<>posterior and lateral weight shift encouraging pt participation to return to midline.   Bed>chair: maximum assist and 2nd assist for standby/safety. Stand pivot transfer technique with use of gait belt    Skilled Therapy Provided: Patient received supine in bed with family  present. RN approved activity. Coordinated session with OT. Therapist educated pt and family on POC and physiological benefits of mobilization. Family education/discussion, primarily regarding plan for rehab placement and realistic mobility goals. Patient initially lethargic with improved alertness once activity initiated. Follows simple commands with increased time. Denies pain. *SpO2 on room air at rest: 94%    The patient's Approx Degree of Impairment: 86.62% has been calculated based on documentation in the [facility] '6 clicks' Inpatient Daily Activity Short Form.  Research supports that patients with this level of impairment may benefit from LTAC, however, pt functioning lower than baseline levels and would benefit from rehab.  Final disposition will be made by interdisciplinary medical team.    THERAPEUTIC EXERCISES  Lower Extremity Ankle pumps     Position Sitting       Patient End of Session: Up in chair;Needs met;RN aware of session/findings;Family present;Alarm set    CURRENT GOALS   Goals to be met by: 3/7/25  Patient Goal Patient's self-stated goal is: not stated   Goal #1 Patient is able to demonstrate supine - sit EOB @ level: minimum assistance      Goal #1   Current Status  Mod A x2   Goal #2 Patient is able to demonstrate transfers Sit to/from Stand at assistance level: minimum assistance with walker - rolling      Goal #2  Current Status  Max A x 1 bed>chair stand pivot   Goal #3 Patient is able to ambulate 10 feet with assist device: walker - rolling at assistance level: minimum assistance   Goal #3   Current Status  NT   Goal #4 Patient to demonstrate independence with home activity/exercise instructions provided to patient in preparation for discharge.   Goal #4   Current Status  in progress   Therapeutic Activity: 23 minutes

## 2025-03-03 NOTE — PROGRESS NOTES
[facility] Hospitalist Progress Note     CC: Hospital Follow up    PCP: Shruthi Simon MD       Assessment/Plan:     Principal Problem:    Delirium, acute  Active Problems:    Dehydration    Primary malignant neoplasm of lung metastatic to other site, unspecified laterality (HCC)    Intracranial hemorrhage (HCC)    Acute hemorrhagic infarction of brain (HCC)    Palliative care by specialist      Ms. 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         L sided weakness  New right paramedian midbrain metastatic lesion  Syncope  - noted on CT head  - hold Eliquis, ok to restart Eliquis per per NSGY  - MRI brain without bleeding noted  - NSGY and neurology consulted  - continue keppra  - stop neurochecks  - PT/OT/SW  - per neurology, 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 Monzon from [facility] he is awaiting approval of an oral immunotherapy that may have some benefit, but not curative,  -palliative care eval  -trial of decadron, was agitated 2/27 palliative stopped provigil  -2/28 appears worse was agitated last night, sleepy this morning after IV pain meds  -continue to monitor, more confused and lethargic last week, seems to be improving with decadron and Tagrisso  - hospice appropriate     Lung Ca with recurrent malignant pleural effusion  PE hx   Small R PTX - POA   Chronic hypoxic respiratory failure on home O2  - S/p R sided PleurX placement 1/31/25  - drain scheduled MWF 9:30AM, up  to 1L  - Already has 2L O2 at home - at baseline now     Lung cancer with brain mets  - follow-up oncology as outpatient  - planned to start chemotherapy this week    - per family, had recent MRI brain a few weeks ago that showed decrease in size of brain mets  - has been on prednisone taper since November, currently on 10 mg daily-> now on decadron  - tegresso was ordered, Dr. Monzon from  wants her to start this, medication has been approved will start 2/27 per Dr. Monzon with oncology -> spit out medication on 2/27, was able to take 2/28 and 3/1 in pudding    DVT/PE  - restart Eliquis   - 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    Hypothyroidism  - Continue synthroid     ACP  - CODE- DNR/select- updated today with POA  - POA- son Brayan updated at bedside   - lives at home with son     FN:  - IVF: stopped IVF  - Diet: regular     DVT Prophy: SCD, eliquis   Lines: port     Dispo: pending course; family discussing hospice pending response with [facility]    Discussed with multiple family members at bedside.      Outpatient records or previous hospital records reviewed.      Further recommendations pending patient's clinical course.  [facility] hospitalist to continue to follow patient while in house     Patient and/or patient's family given opportunity to ask questions and note understanding and agreeing with therapeutic plan as outlined    Vanessa Elam MD  [facility] Hospitalist  Answering Service number: [phone]     Subjective:     Very alert earlier this AM, was able to interact with her granddaughter, taking a nap now. Eating more    OBJECTIVE:    Blood pressure 119/74, pulse 85, temperature 97.1 °F (36.2 °C), temperature source Axillary, resp. rate 16, height 5' 3\" (1.6 m), weight 128 lb (58.1 kg), SpO2 96%, not currently breastfeeding.    Temp:  [97.1 °F (36.2 °C)-98 °F (36.7 °C)] 97.1 °F (36.2 °C)  Pulse:  [72-90] 85  Resp:  [16-24] 16  BP: (119-130)/(74-82) 119/74  SpO2:  [95 %-96 %] 96  %      Intake/Output:    Intake/Output Summary (Last 24 hours) at 3/3/2025 1259  Last data filed at 3/3/2025 0600  Gross per 24 hour   Intake 10 ml   Output 250 ml   Net -240 ml       Last 3 Weights   02/25/25 0507 128 lb (58.1 kg)   02/21/25 1200 126 lb 8.7 oz (57.4 kg)   02/20/25 2050 128 lb 1.4 oz (58.1 kg)   02/20/25 1146 162 lb 0.6 oz (73.5 kg)   03/23/22 0942 162 lb (73.5 kg)   01/12/22 1248 160 lb (72.6 kg)       Exam  GEN: sleeping, was more awake earlier today, answers some questions   HEENT: EOMI  Pulm: decreased breath sounds R side, no increased work of breathing  CV: RRR, no murmurs  ABD: Soft, non-tender, non-distended, +BS  Neuro: LUE/LLE weakness, follows commands      Data Review:       Labs:     Recent Labs   Lab 02/26/25 0438 03/01/25  0432 03/03/25  0849   RBC 3.09* 3.00* 3.00*   HGB 11.0* 10.2* 10.6*   HCT 30.4* 28.9* 28.9*   MCV 98.4 96.3 96.3   MCH 35.6* 34.0 35.3*   MCHC 36.2 35.3 36.7   RDW 12.8 12.6 12.9   NEPRELIM 3.83 3.91  --    WBC 4.6 4.7 5.5   .0 188.0 131.0*         Recent Labs   Lab 02/26/25 0438 03/01/25  0433 03/02/25  0649 03/03/25  0849   * 143*  --  118*   BUN 16 24*  --  21   CREATSERUM 0.55 0.55  --  0.52*   EGFRCR 96 96  --  97   CA 8.1* 7.8*  --  8.0*    145  --  142   K 4.3  4.3 3.7 4.0 3.8    113*  --  112   CO2 22.0 22.0  --  21.0       No results for input(s): \"ALT\", \"AST\", \"ALB\", \"AMYLASE\", \"LIPASE\", \"LDH\" in the last 168 hours.    Invalid input(s): \"ALPHOS\", \"TBIL\", \"DBIL\", \"TPROT\"      Imaging:  XR CHEST AP PORTABLE  (CPT=71045)    Result Date: 3/2/2025  CONCLUSION:   Large right pleural effusion with associated multifocal opacities in right lung, which may reflect atelectasis, pneumonitis, pulmonary edema, or pneumonia.  Well-positioned right pleural drainage catheter and right chest wall port.     Dictated by (CST): Matt Campuzano MD on 3/02/2025 at 11:22 AM     Finalized by (CST): Matt Campuzano MD on 3/02/2025 at 11:24 AM              Meds:     INPATIENT MEDICATIONS    Scheduled Medications:      potassium chloride, 20 mEq, Once  loperamide, 2 mg, q12h  Osimertinib Mesylate, 80 mg, Daily  dexamethasone, 4 mg, TID  apixaban, 5 mg, BID  famotidine, 20 mg, Daily   Or  famotidine, 20 mg, Daily  levothyroxine, 50 mcg, Daily  levETIRAcetam, 500 mg, Q12H            Drips:           PRN Medications  guaiFENesin, 100 mg, Q4H PRN  acetaminophen, 650 mg, Q4H PRN   Or  acetaminophen, 650 mg, Q4H PRN  labetalol, 10 mg, Q10 Min PRN  hydrALAzine, 10 mg, Q2H PRN  ondansetron, 4 mg, Q6H PRN   Or  metoclopramide, 10 mg, Q8H PRN  albuterol, 1 puff, Q6H PRN

## 2025-03-03 NOTE — OCCUPATIONAL THERAPY NOTE
OCCUPATIONAL THERAPY TREATMENT NOTE - INPATIENT        Room Number: 462/462-A     Presenting Problem: acute delirium    Problem List  Principal Problem:    Delirium, acute  Active Problems:    Dehydration    Primary malignant neoplasm of lung metastatic to other site, unspecified laterality (HCC)    Intracranial hemorrhage (HCC)    Acute hemorrhagic infarction of brain (HCC)    Palliative care by specialist      OCCUPATIONAL THERAPY ASSESSMENT   Patient demonstrates limited progress this session, goals remain in progress.    Patient continues to benefit from continued skilled OT services: to promote return to prior level of function and safety with continuous assistance and gradual rehabilitative therapy.     PLAN DURING HOSPITALIZATION     OT Treatment Plan: Balance activities;Energy conservation/work simplification techniques;ADL training;Functional transfer training;Endurance training;Patient/Family education;Patient/Family training;Compensatory technique education     SUBJECTIVE  Thank you    OBJECTIVE  Precautions: Limb alert - left;Seizure;Bed/chair alarm    WEIGHT BEARING RESTRICTION     PAIN ASSESSMENT  Rating: -- (intermittent c/o not rated)  Location: LT UE/IV site (son reports pt is having pain d/t K+ infusion)  Management Techniques: Repositioning    ACTIVITY TOLERANCE            223210             O2 SATURATIONS       ACTIVITIES OF DAILY LIVING ASSESSMENT  AM-PAC ‘6-Clicks’ Inpatient Daily Activity Short Form  How much help from another person does the patient currently need…  -   Putting on and taking off regular lower body clothing?: A Lot  -   Bathing (including washing, rinsing, drying)?: A Lot  -   Toileting, which includes using toilet, bedpan or urinal? : Total  -   Putting on and taking off regular upper body clothing?: A Lot  -   Taking care of personal grooming such as brushing teeth?: A Lot  -   Eating meals?: A Lot    AM-PAC Score:  Score: 11  Approx Degree of Impairment:  70.42%  Standardized Score (AM-PAC Scale): 29.04  CMS Modifier (G-Code): CL    FUNCTIONAL TRANSFER ASSESSMENT  Sit to Stand: -- (deferred at this time)    BED MOBILITY  Rolling: Maximum Assist  Supine to Sit : Maximum Assist  Sit to Supine (OT): Maximum Assist  Scooting: -- (Max A)    BALANCE ASSESSMENT  Static Sitting: Maximum Assist  Static Standing: Not Tested  Skilled Therapy Provided: PT care coordinated with PT; pt presents in bed with supportive family present; pt requiring Max x2 for bed mobiltiy; Mod A for sitting balance with posterior and L lateral lean; Min-Mod A to correct; pt with limited righting reactions or protective responses when LOB occurred; tolerating >8 minutes at side of bed; facilitating weight shifting laterally and posteriorly/anteriorly with Mod-Max A; pt agreeable to sit up in chair; provided Max a x1 for SPT to bedside chair; tolerated well; VSS thorughout; pt left up in chair at end of session.       ADLs:  Self Feeding: Setup  Grooming: Min A  Bathing: Max A  LE Dressing: Max A   ToiletingL Max A       EDUCATION PROVIDED  Patient Education : Role of Occupational Therapy; Plan of Care; Discharge Recommendations; Functional Transfer Techniques  Patient's Response to Education: Does Not Demonstrate Skills Needed for Learning  Family/Caregiver Education : Role of Occupational Therapy; Plan of Care; Discharge Recommendations; DME Recommendations; Functional Transfer Techniques; Fall Prevention; Energy Conservation  Family/Caregiver's Response to Education: Verbalized Understanding; Requires Further Education    The patient's Approx Degree of Impairment: 70.42% has been calculated based on documentation in the [facility] '6 clicks' Inpatient Daily Activity Short Form.  Research supports that patients with this level of impairment may benefit from GR.  Final disposition will be made by interdisciplinary medical team.    Patient End of Session: Up in chair    OT Goals:      OT Goals:  Patients  self stated goal is: not indicated      Patient will complete functional transfer with min A  Comment: ongoing    Patient will complete UE dressing with Min A  Comment: ongoing    Patient will tolerate standing for 2 minutes in prep for adls with Min A   Comment: ongoing    Patient will complete LE dressing with Min A  Comment:ongoing               OT Session Time: 23 minutes  Therapeutic Activities: 23

## 2025-03-03 NOTE — PLAN OF CARE
Problem: Patient Centered Care  Goal: Patient preferences are identified and integrated in the patient's plan of care  Description: Interventions:  - What would you like us to know as we care for you? \"My family helps take care of me.\"  - Provide timely, complete, and accurate information to patient/family  - Incorporate patient and family knowledge, values, beliefs, and cultural backgrounds into the planning and delivery of care  - Encourage patient/family to participate in care and decision-making at the level they choose  - Honor patient and family perspectives and choices  Outcome: Progressing     Problem: Patient/Family Goals  Goal: Patient/Family Long Term Goal  Description: Patient's Long Term Goal: Feel better    Interventions:  - Educate on taking medications as prescribed  - Monitor vitals  -Up as tolerated  - See additional Care Plan goals for specific interventions  Outcome: Progressing  Goal: Patient/Family Short Term Goal  Description: Patient's Short Term Goal: No syncopal episodes    Interventions:   - EKG  - EEG  - Neurology consult  - Monitor vitals  - MRI  - See additional Care Plan goals for specific interventions  Outcome: Progressing     Problem: NEUROLOGICAL - ADULT  Goal: Achieves stable or improved neurological status  Description: INTERVENTIONS  - Assess for and report changes in neurological status  - Initiate measures to prevent increased intracranial pressure  - Maintain blood pressure and fluid volume within ordered parameters to optimize cerebral perfusion and minimize risk of hemorrhage  - Monitor temperature, glucose, and sodium. Initiate appropriate interventions as ordered  Outcome: Progressing  Goal: Absence of seizures  Description: INTERVENTIONS  - Monitor for seizure activity  - Administer anti-seizure medications as ordered  - Monitor neurological status  Outcome: Progressing  Goal: Remains free of injury related to seizure activity  Description: INTERVENTIONS:  - Maintain  airway, patient safety  and administer oxygen as ordered  - Monitor patient for seizure activity, document and report duration and description of seizure to MD/LIP  - If seizure occurs, turn patient to side and suction secretions as needed  - Reorient patient post seizure  - Seizure pads on all 4 side rails  - Instruct patient/family to notify RN of any seizure activity  - Instruct patient/family to call for assistance with activity based on assessment  Outcome: Progressing  Goal: Achieves maximal functionality and self care  Description: INTERVENTIONS  - Monitor swallowing and airway patency with patient fatigue and changes in neurological status  - Encourage and assist patient to increase activity and self care with guidance from PT/OT  - Encourage visually impaired, hearing impaired and aphasic patients to use assistive/communication devices  Outcome: Progressing     Problem: Impaired Cognition  Goal: Patient will exhibit improved attention, thought processing and/or memory  Description: Interventions:    Outcome: Progressing     Problem: PAIN - ADULT  Goal: Verbalizes/displays adequate comfort level or patient's stated pain goal  Description: INTERVENTIONS:  - Encourage pt to monitor pain and request assistance  - Assess pain using appropriate pain scale  - Administer analgesics based on type and severity of pain and evaluate response  - Implement non-pharmacological measures as appropriate and evaluate response  - Consider cultural and social influences on pain and pain management  - Manage/alleviate anxiety  - Utilize distraction and/or relaxation techniques  - Monitor for opioid side effects  - Notify MD/LIP if interventions unsuccessful or patient reports new pain  - Anticipate increased pain with activity and pre-medicate as appropriate  Outcome: Progressing     Problem: SAFETY ADULT - FALL  Goal: Free from fall injury  Description: INTERVENTIONS:  - Assess pt frequently for physical needs  - Identify  cognitive and physical deficits and behaviors that affect risk of falls.  - [place] fall precautions as indicated by assessment.  - Educate pt/family on patient safety including physical limitations  - Instruct pt to call for assistance with activity based on assessment  - Modify environment to reduce risk of injury  - Provide assistive devices as appropriate  - Consider OT/PT consult to assist with strengthening/mobility  - Encourage toileting schedule  Outcome: Progressing     Problem: DISCHARGE PLANNING  Goal: Discharge to home or other facility with appropriate resources  Description: INTERVENTIONS:  - Identify barriers to discharge w/pt and caregiver  - Include patient/family/discharge partner in discharge planning  - Arrange for needed discharge resources and transportation as appropriate  - Identify discharge learning needs (meds, wound care, etc)  - Arrange for interpreters to assist at discharge as needed  - Consider post-discharge preferences of patient/family/discharge partner  - Complete POLST form as appropriate  - Assess patient's ability to be responsible for managing their own health  - Refer to Case Management Department for coordinating discharge planning if the patient needs post-hospital services based on physician/LIP order or complex needs related to functional status, cognitive ability or social support system  Outcome: Progressing   No acute changes. Vital signs stable. Patient is lethargic, less restless overnight. Denies pain. IV Keppra dn Decadron continued. Pureiwck in lace. No BM overnight. Daughters at the bedside.

## 2025-03-03 NOTE — CM/SW NOTE
Department  notified by Kevan carter (Paola SAVAGE) auth expires 3/4 @ midnight, DSC notified care team.    Assigned SW/CM to follow up with patient/family on discharge plan.     Erin Khan, DSC

## 2025-03-04 ENCOUNTER — APPOINTMENT (OUTPATIENT)
Dept: GENERAL RADIOLOGY | Facility: HOSPITAL | Age: 75
End: 2025-03-04
Attending: HOSPITALIST
Payer: MEDICARE

## 2025-03-04 LAB
ANION GAP SERPL CALC-SCNC: 12 MMOL/L (ref 0–18)
BUN BLD-MCNC: 21 MG/DL (ref 9–23)
BUN/CREAT SERPL: 37.5 (ref 10–20)
CALCIUM BLD-MCNC: 8.2 MG/DL (ref 8.7–10.4)
CHLORIDE SERPL-SCNC: 112 MMOL/L (ref 98–112)
CO2 SERPL-SCNC: 20 MMOL/L (ref 21–32)
CREAT BLD-MCNC: 0.56 MG/DL
DEPRECATED RDW RBC AUTO: 45.4 FL (ref 35.1–46.3)
EGFRCR SERPLBLD CKD-EPI 2021: 95 ML/MIN/1.73M2 (ref 60–?)
ERYTHROCYTE [DISTWIDTH] IN BLOOD BY AUTOMATED COUNT: 13 % (ref 11–15)
GLUCOSE BLD-MCNC: 134 MG/DL (ref 70–99)
HCT VFR BLD AUTO: 32.5 %
HGB BLD-MCNC: 11.7 G/DL
MCH RBC QN AUTO: 35 PG (ref 26–34)
MCHC RBC AUTO-ENTMCNC: 36 G/DL (ref 31–37)
MCV RBC AUTO: 97.3 FL
OSMOLALITY SERPL CALC.SUM OF ELEC: 303 MOSM/KG (ref 275–295)
PLATELET # BLD AUTO: 115 10(3)UL (ref 150–450)
POTASSIUM SERPL-SCNC: 3.9 MMOL/L (ref 3.5–5.1)
POTASSIUM SERPL-SCNC: 3.9 MMOL/L (ref 3.5–5.1)
RBC # BLD AUTO: 3.34 X10(6)UL
SODIUM SERPL-SCNC: 144 MMOL/L (ref 136–145)
WBC # BLD AUTO: 6.1 X10(3) UL (ref 4–11)

## 2025-03-04 PROCEDURE — 71045 X-RAY EXAM CHEST 1 VIEW: CPT | Performed by: HOSPITALIST

## 2025-03-04 PROCEDURE — 99233 SBSQ HOSP IP/OBS HIGH 50: CPT | Performed by: INTERNAL MEDICINE

## 2025-03-04 RX ORDER — GUAIFENESIN 600 MG/1
600 TABLET, EXTENDED RELEASE ORAL 2 TIMES DAILY
Status: DISCONTINUED | OUTPATIENT
Start: 2025-03-04 | End: 2025-03-04

## 2025-03-04 NOTE — PROGRESS NOTES
[facility] Hospitalist Progress Note     CC: Hospital Follow up    PCP: Shruthi Simon MD       Assessment/Plan:     Principal Problem:    Delirium, acute  Active Problems:    Dehydration    Primary malignant neoplasm of lung metastatic to other site, unspecified laterality (HCC)    Intracranial hemorrhage (HCC)    Acute hemorrhagic infarction of brain (HCC)    Palliative care by specialist      Ms. 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    Overall per family her mental status is improving and she seems more comfortable when at rest.  Less agitated.     L sided weakness  New right paramedian midbrain metastatic lesion  Syncope  - noted on CT head  - hold Eliquis, ok to restart Eliquis per per NSGY  - MRI brain without bleeding noted  - NSGY and neurology consulted  - continue keppra  - stop neurochecks  - PT/OT/SW  - per neurology, 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 Monzon from [facility] he is awaiting approval of an oral immunotherapy that may have some benefit, but not curative,  -palliative care eval  -trial of decadron, was agitated 2/27 palliative stopped provigil  -2/28 appears worse was agitated last night, sleepy this morning after IV pain meds  -continue to monitor, more confused and lethargic last week, seems to be improving with decadron and Tagrisso  -Now improving over the weekend.  Family would like to get her to rehab and continue her oral chemo agent.  They  understand that she may continue to decline and be hospice appropriate in the near future.      Lung Ca with recurrent malignant pleural effusion  PE hx   Small R PTX - POA   Chronic hypoxic respiratory failure on home O2  - S/p R sided PleurX placement 1/31/25  - drain scheduled MWF 9:30AM, up to 1L  - Already has 2L O2 at home - at baseline now   -Most recent chest x-ray with good placement of pleurx, however has not been draining as much, will get repeat chest x-ray today, continue to drain on schedule    Lung cancer with brain mets  - follow-up oncology as outpatient  - planned to start chemotherapy this week    - per family, had recent MRI brain a few weeks ago that showed decrease in size of brain mets  - has been on prednisone taper since November, currently on 10 mg daily-> now on decadron  - tegresso was ordered, Dr. Monzon from  wants her to start this, medication has been approved will start 2/27 per Dr. Monzon with oncology -> spit out medication on 2/27, was able to take 2/28 and 3/1 in pudding  -Will message her [facility] oncologist for Decadron taper    DVT/PE  - restart Eliquis   - 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    Hypothyroidism  - Continue synthroid     ACP  - CODE- DNR/select- updated today with POA  - POA- son Brayan updated at bedside   - lives at home with son     FN:  - IVF: stopped IVF  - Diet: regular     DVT Prophy: SCD, eliquis   Lines: port     Dispo: pending course; family discussing hospice pending response with [facility]    Discussed with multiple family members at bedside.        Further recommendations pending patient's clinical course.  [facility] hospitalist to continue to follow patient while in house     Patient and/or patient's family given opportunity to ask questions and note understanding and agreeing with therapeutic plan as outlined    Note: This chart was prepared using voice recognition software and may contain unintended word substitution errors.     Amelia  MD Silvano  Hospitalist  [facility]   Answering service: [phone]       Subjective:     Very alert earlier this AM but now more sleepy.  Overall improving per family,     OBJECTIVE:    Blood pressure 123/74, pulse 102, temperature 97.1 °F (36.2 °C), temperature source Oral, resp. rate 18, height 5' 3\" (1.6 m), weight 134 lb (60.8 kg), SpO2 95%, not currently breastfeeding.    Temp:  [97 °F (36.1 °C)-98.1 °F (36.7 °C)] 97.1 °F (36.2 °C)  Pulse:  [] 102  Resp:  [18] 18  BP: (122-132)/(74-96) 123/74  SpO2:  [95 %-97 %] 95 %      Intake/Output:    Intake/Output Summary (Last 24 hours) at 3/4/2025 1559  Last data filed at 3/4/2025 0447  Gross per 24 hour   Intake --   Output 350 ml   Net -350 ml       Last 3 Weights   03/04/25 0446 134 lb (60.8 kg)   02/25/25 0507 128 lb (58.1 kg)   02/21/25 1200 126 lb 8.7 oz (57.4 kg)   02/20/25 2050 128 lb 1.4 oz (58.1 kg)   02/20/25 1146 162 lb 0.6 oz (73.5 kg)   03/23/22 0942 162 lb (73.5 kg)   01/12/22 1248 160 lb (72.6 kg)       Exam  GEN: sleeping, was more awake earlier today   HEENT: EOMI  Pulm: decreased breath sounds R side, no increased work of breathing  CV: RRR, no murmurs  ABD: Soft, non-tender, non-distended, +BS  Neuro: LUE/LLE weakness, follows commands      Data Review:       Labs:     Recent Labs   Lab 02/26/25  0438 03/01/25  0432 03/03/25  0849 03/04/25  0644   RBC 3.09* 3.00* 3.00* 3.34*   HGB 11.0* 10.2* 10.6* 11.7*   HCT 30.4* 28.9* 28.9* 32.5*   MCV 98.4 96.3 96.3 97.3   MCH 35.6* 34.0 35.3* 35.0*   MCHC 36.2 35.3 36.7 36.0   RDW 12.8 12.6 12.9 13.0   NEPRELIM 3.83 3.91  --   --    WBC 4.6 4.7 5.5 6.1   .0 188.0 131.0* 115.0*         Recent Labs   Lab 03/01/25  0433 03/02/25  0649 03/03/25  0849 03/04/25  0644   *  --  118* 134*   BUN 24*  --  21 21   CREATSERUM 0.55  --  0.52* 0.56   EGFRCR 96  --  97 95   CA 7.8*  --  8.0* 8.2*     --  142 144   K 3.7 4.0 3.8 3.9  3.9   *  --  112 112   CO2 22.0  --  21.0  20.0*       No results for input(s): \"ALT\", \"AST\", \"ALB\", \"AMYLASE\", \"LIPASE\", \"LDH\" in the last 168 hours.    Invalid input(s): \"ALPHOS\", \"TBIL\", \"DBIL\", \"TPROT\"      Imaging:  XR CHEST AP PORTABLE  (CPT=71045)    Result Date: 3/2/2025  CONCLUSION:   Large right pleural effusion with associated multifocal opacities in right lung, which may reflect atelectasis, pneumonitis, pulmonary edema, or pneumonia.  Well-positioned right pleural drainage catheter and right chest wall port.     Dictated by (CST): Matt Campuzano MD on 3/02/2025 at 11:22 AM     Finalized by (CST): Matt Campuzano MD on 3/02/2025 at 11:24 AM             Meds:     INPATIENT MEDICATIONS    Scheduled Medications:      loperamide, 2 mg, q12h  Osimertinib Mesylate, 80 mg, Daily  dexamethasone, 4 mg, TID  apixaban, 5 mg, BID  famotidine, 20 mg, Daily   Or  famotidine, 20 mg, Daily  levothyroxine, 50 mcg, Daily  levETIRAcetam, 500 mg, Q12H            Drips:           PRN Medications  guaiFENesin, 100 mg, Q4H PRN  acetaminophen, 650 mg, Q4H PRN   Or  acetaminophen, 650 mg, Q4H PRN  labetalol, 10 mg, Q10 Min PRN  hydrALAzine, 10 mg, Q2H PRN  ondansetron, 4 mg, Q6H PRN   Or  metoclopramide, 10 mg, Q8H PRN  albuterol, 1 puff, Q6H PRN

## 2025-03-04 NOTE — PLAN OF CARE
Leti is alert and oriented x 4, she is increasingly more alert, She denies pain or nausea. Patient lifted to the chair today. Tolerating more of meals. One loose stool today. Medications given through the IV and crushed with pudding. Call light within reach, family at bedside.  Problem: Patient Centered Care  Goal: Patient preferences are identified and integrated in the patient's plan of care  Description: Interventions:  - What would you like us to know as we care for you? \"My family helps take care of me.\"  - Provide timely, complete, and accurate information to patient/family  - Incorporate patient and family knowledge, values, beliefs, and cultural backgrounds into the planning and delivery of care  - Encourage patient/family to participate in care and decision-making at the level they choose  - Honor patient and family perspectives and choices  Outcome: Progressing     Problem: Patient/Family Goals  Goal: Patient/Family Long Term Goal  Description: Patient's Long Term Goal: Feel better    Interventions:  - Educate on taking medications as prescribed  - Monitor vitals  -Up as tolerated  - See additional Care Plan goals for specific interventions  Outcome: Progressing  Goal: Patient/Family Short Term Goal  Description: Patient's Short Term Goal: No syncopal episodes    Interventions:   - EKG  - EEG  - Neurology consult  - Monitor vitals  - MRI  - See additional Care Plan goals for specific interventions  Outcome: Progressing     Problem: NEUROLOGICAL - ADULT  Goal: Achieves stable or improved neurological status  Description: INTERVENTIONS  - Assess for and report changes in neurological status  - Initiate measures to prevent increased intracranial pressure  - Maintain blood pressure and fluid volume within ordered parameters to optimize cerebral perfusion and minimize risk of hemorrhage  - Monitor temperature, glucose, and sodium. Initiate appropriate interventions as ordered  Outcome: Progressing  Goal:  Absence of seizures  Description: INTERVENTIONS  - Monitor for seizure activity  - Administer anti-seizure medications as ordered  - Monitor neurological status  Outcome: Progressing  Goal: Remains free of injury related to seizure activity  Description: INTERVENTIONS:  - Maintain airway, patient safety  and administer oxygen as ordered  - Monitor patient for seizure activity, document and report duration and description of seizure to MD/LIP  - If seizure occurs, turn patient to side and suction secretions as needed  - Reorient patient post seizure  - Seizure pads on all 4 side rails  - Instruct patient/family to notify RN of any seizure activity  - Instruct patient/family to call for assistance with activity based on assessment  Outcome: Progressing  Goal: Achieves maximal functionality and self care  Description: INTERVENTIONS  - Monitor swallowing and airway patency with patient fatigue and changes in neurological status  - Encourage and assist patient to increase activity and self care with guidance from PT/OT  - Encourage visually impaired, hearing impaired and aphasic patients to use assistive/communication devices  Outcome: Progressing     Problem: Impaired Cognition  Goal: Patient will exhibit improved attention, thought processing and/or memory  Description: Interventions:    Outcome: Progressing     Problem: PAIN - ADULT  Goal: Verbalizes/displays adequate comfort level or patient's stated pain goal  Description: INTERVENTIONS:  - Encourage pt to monitor pain and request assistance  - Assess pain using appropriate pain scale  - Administer analgesics based on type and severity of pain and evaluate response  - Implement non-pharmacological measures as appropriate and evaluate response  - Consider cultural and social influences on pain and pain management  - Manage/alleviate anxiety  - Utilize distraction and/or relaxation techniques  - Monitor for opioid side effects  - Notify MD/LIP if interventions  unsuccessful or patient reports new pain  - Anticipate increased pain with activity and pre-medicate as appropriate  Outcome: Progressing     Problem: SAFETY ADULT - FALL  Goal: Free from fall injury  Description: INTERVENTIONS:  - Assess pt frequently for physical needs  - Identify cognitive and physical deficits and behaviors that affect risk of falls.  - [place] fall precautions as indicated by assessment.  - Educate pt/family on patient safety including physical limitations  - Instruct pt to call for assistance with activity based on assessment  - Modify environment to reduce risk of injury  - Provide assistive devices as appropriate  - Consider OT/PT consult to assist with strengthening/mobility  - Encourage toileting schedule  Outcome: Progressing     Problem: DISCHARGE PLANNING  Goal: Discharge to home or other facility with appropriate resources  Description: INTERVENTIONS:  - Identify barriers to discharge w/pt and caregiver  - Include patient/family/discharge partner in discharge planning  - Arrange for needed discharge resources and transportation as appropriate  - Identify discharge learning needs (meds, wound care, etc)  - Arrange for interpreters to assist at discharge as needed  - Consider post-discharge preferences of patient/family/discharge partner  - Complete POLST form as appropriate  - Assess patient's ability to be responsible for managing their own health  - Refer to Case Management Department for coordinating discharge planning if the patient needs post-hospital services based on physician/LIP order or complex needs related to functional status, cognitive ability or social support system  Outcome: Progressing

## 2025-03-04 NOTE — PLAN OF CARE
Patient A/Ox3. Forgetful and lethargic. Room air. Purevick in place. No BM. Max assist. IV keppra and decadron. Seizure precautions in place. Pills crushed with pudding. General ,[place] thick diet. Family by the bedside.   Problem: Patient Centered Care  Goal: Patient preferences are identified and integrated in the patient's plan of care  Description: Interventions:  - What would you like us to know as we care for you? \"My family helps take care of me.\"  - Provide timely, complete, and accurate information to patient/family  - Incorporate patient and family knowledge, values, beliefs, and cultural backgrounds into the planning and delivery of care  - Encourage patient/family to participate in care and decision-making at the level they choose  - Honor patient and family perspectives and choices  Outcome: Progressing     Problem: Patient/Family Goals  Goal: Patient/Family Long Term Goal  Description: Patient's Long Term Goal: Feel better    Interventions:  - Educate on taking medications as prescribed  - Monitor vitals  -Up as tolerated  - See additional Care Plan goals for specific interventions  Outcome: Progressing

## 2025-03-04 NOTE — DIETARY NOTE
ADULT NUTRITION REASSESSMENT    Pt is at high nutrition risk.  Pt meets severe malnutrition criteria.      RECOMMENDATIONS TO MD: See nutrition intervention for ONS (oral nutrition supplements)    ADMITTING DIAGNOSIS:  Dehydration [E86.0]  Delirium, acute [R41.0]  Intracranial hemorrhage (HCC) [I62.9]  Primary malignant neoplasm of lung metastatic to other site, unspecified laterality (HCC) [C34.90]  PERTINENT PAST MEDICAL HISTORY:   Past Medical History:    Depression    off Cymbalta    Esophageal reflux    HYPERLIPIDEMIA    Lung cancer (HCC)    with brain mets    OSTEOARTHRITIS    OTHER DISEASES    Hashimoto's--sees Dr. Palmer--Endo    OTHER DISEASES    DEXA-WNL 2008    Reflux    Thyroid disease       PATIENT STATUS: Initial 02/24/25: Pt assessed due to screening at risk for MST 3 for decreased weight and decreased appetite. Per chart review, pt had a 15% weight loss in 2 months. (21 lbs). Pt's intake 20 - 70% x 5 since admit. Last BM 2/23 soft brown. Pt asleep when visited, spoke with son - pt has decreased appetite at home, drinks ensure daily. Son says no problem chewing. Son said pt enjoys Mighty shakes, would like to have TID. NFPE not done at beside, pt was asleep, visual exam coincides with RDN assessment on 1/6/25 from [facility].    3/4/25 UPDATE: pt more alert the past couple days. Sleeping at my visit, so family providing information. Pt able to feed herself per family. Takes the honey thick shakes--adds to oatmeal sometimes.  Agreed to trial of magic cup again. On decadron which may be making pt more alert.      FOOD/NUTRITION RELATED HISTORY:  Appetite: Fair  Intake:  50% of 2 meals yesterday. Good intake of B this am.    Intake Meeting Needs: No, but oral nutrition supplements (ONS) to maximize  Percent Meals Eaten (last 6 days)       Date/Time Percent Meals Eaten (%)    02/26/25 1542 25 %    02/26/25 1840 --     Percent Meals Eaten (%): PT ate two teaspoons of meatleaof at 02/26/25 1840     03/03/25 0800 50 %    03/03/25 1200 50 %           Food Allergies: No Known Food Allergies (NKFA)  Cultural/Ethnic/Evangelical Preferences: Not Obtained    GASTROINTESTINAL: +BM 3/2     MEDICATIONS: reviewed   loperamide  2 mg Oral q12h    Osimertinib Mesylate  80 mg Oral Daily    dexamethasone  4 mg Intravenous TID    apixaban  5 mg Oral BID    famotidine  20 mg Oral Daily    Or    famotidine  20 mg Intravenous Daily    levothyroxine  50 mcg Oral Daily    levETIRAcetam  500 mg Intravenous Q12H     LABS: reviewed-   Recent Labs     03/01/25  0433 03/02/25  0649 03/03/25  0849 03/04/25  0644   *  --  118* 134*   BUN 24*  --  21 21   CREATSERUM 0.55  --  0.52* 0.56   CA 7.8*  --  8.0* 8.2*   MG 2.0  --   --   --      --  142 144   K 3.7 4.0 3.8 3.9  3.9   *  --  112 112   CO2 22.0  --  21.0 20.0*   OSMOCALC 307*  --  298* 303*       WEIGHT HISTORY:  Patient Weight(s) for the past 336 hrs:   Weight   03/04/25 0446 60.8 kg (134 lb)   02/25/25 0507 58.1 kg (128 lb)   02/21/25 1200 57.4 kg (126 lb 8.7 oz)   02/20/25 2050 58.1 kg (128 lb 1.4 oz)   02/20/25 1146 73.5 kg (162 lb 0.6 oz)     Wt Readings from Last 10 Encounters:   03/04/25 60.8 kg (134 lb)   03/23/22 73.5 kg (162 lb)   01/12/22 72.6 kg (160 lb)   12/02/21 68 kg (150 lb)   09/22/21 68 kg (150 lb)   07/15/19 80.6 kg (177 lb 9.6 oz)   05/20/19 81.9 kg (180 lb 9.6 oz)   11/29/18 80.3 kg (177 lb)   10/12/17 80.3 kg (177 lb)   10/04/17 79.4 kg (175 lb)       ANTHROPOMETRICS:  HT: 160 cm (5' 3\")  Wt Readings from Last 1 Encounters:   03/04/25 60.8 kg (134 lb)     Last weight: Likely accurate  BMI: Body mass index is 23.74 kg/m².  Dosing Weight: 57.4 kg - recent weight, utilized for anthropometric calculations  BMI CLASSIFICATION: 18.5-24.9 kg/m2 - WNL  IBW/lbs (Calculated) Female: 115 lbs  IBW: 115 lbs         109% IBW  Usual Body Wt: 153 lbs       82% UBW    NUTRITION RELATED PHYSICAL FINDINGS:  - Nutrition Focused Physical Exam (NFPE): mild  depletion body fat orbital region and triceps region and mild depletion muscle mass temple region, clavicle region, and shoulder region Per RDN note from 1/6/25 [facility]. Agree with findings per visual exam.   - Fluid Accumulation: non-pitting Left Hand (s). See RN documentation for details  - Skin Integrity: at risk. See RN documentation for details    CRITERIA FOR MALNUTRITION DIAGNOSIS:  Criteria for severe malnutrition diagnosis: chronic illness related to wt loss greater than 5% in 1 month and energy intake less than 75% for greater than 1 month.    NUTRITION DIAGNOSIS/PROBLEM:   Severe Malnutrition related to Chronic - Physiological causes increasing nutrient needs due to illness or condition from lung cancer as evidenced by 21 lb (15%) weight loss in 2 months, mild muscle and fat depletion.     NUTRITION DIAGNOSIS PROGRESS:  Improvement (unresolved)--improving po intake and maximized with ONS intake.      NUTRITION INTERVENTION:     NUTRITION PRESCRIPTION:   Estimated Nutrition needs: --dosing wt of 57.4 kg - wt taken on 2/21/25  Calories: 1607 - 1837 calories/day (28 - 32 calories per kg Dosing wt)  Protein: 69 - 86 g protein/day (1.2 - 1.5 g protein/kg Dosing wt)      - Diet:       Procedures    Regular/General diet Fluid Consistency: Honey Thick / Moderately Thick Liquids; Texture Consistency: Chopped / Soft & Bite Sized; Is Patient on Accuchecks? No; Misc Restriction: No Straw      - Nutrition Care Plan: Encouraged increased PO intake, Encouraged small frequent meals with emphasis on high calorie/high protein, and Initiated ONS (oral nutritional supplements)  - ONS (Oral Nutrition Supplements)/Meals/Snacks: Honey Thick-Mighty Shakes SF Vanilla (200 calories/ 7 g protein each) Daily and Magic Cup (290 calories/ 9 g protein each) BID Chocolate   - Vitamin and mineral supplements: none  - Feeding assistance: meal set up  - Nutrition education:  Discussed with pt family importance of increased PO  intake.    - Coordination of nutrition care: collaboration with other providers  - Discharge and transfer of nutrition care to new setting or provider: monitor plans    MONITOR AND EVALUATE/NUTRITION GOALS:  - Food and Nutrient Intake:      Monitor: adequacy of PO intake and adequacy of supplement intake  - Food and Nutrient Administration:      Monitor: N/A  - Anthropometric Measurement:    Monitor weight  - Nutrition Goals:      halt wt loss, PO and supplement greater than 75% of needs, labs within acceptable limits, and minimize lean body mass loss    DIETITIAN FOLLOW UP: RD to follow and monitor nutrition status    Jovanna Carcamo RD, LDN   Clinical Dietitian [phone]

## 2025-03-04 NOTE — CM/SW NOTE
Georgina rec'd call from Son Brayan asking for home cg/RN resources.    CM provided list with explanation of oop cost.    Son sts they are still weighing options for SHAR vs home if they can get enough support in place.    CM suggested medical bed at a minimum, consider anne/commode once home and able to determine if pt will be mobile enough to be beneficial. Son vu and ageed.    GEORGINA sent medical bed ref to E    MD will need to document the following in a progress note:    Patient has a history of (diagnosis) which would require them to elevate the head of the bed to reduce (enter symptoms).  Patient is bed bound and is not able to reposition themself and needs the bed to alleviate pain in their (specific location).  The patient also requires frequent and immediate changes in body position.  It is in my opinion that a semi-electric hospital bed is reasonable and necessary.       MD will need to cosign the  consult order placed for medical bed.    Plan  Pending    / to remain available for support and/or discharge planning.     Parris Vergara, [place]A    Ext [phone]

## 2025-03-04 NOTE — PLAN OF CARE
Patient a+o x3, at times forgetful. Speaks slowly and in whispers. On RA. Rolling side to side. Purewick in place. No bm overnight. Denies pain or nausea. Tolerating honey thick diet, appetite has improved. Seizure precautions in place. Family at bedside overnight. Call light in reach.     Problem: Patient Centered Care  Goal: Patient preferences are identified and integrated in the patient's plan of care  Description: Interventions:  - What would you like us to know as we care for you? \"My family helps take care of me.\"  - Provide timely, complete, and accurate information to patient/family  - Incorporate patient and family knowledge, values, beliefs, and cultural backgrounds into the planning and delivery of care  - Encourage patient/family to participate in care and decision-making at the level they choose  - Honor patient and family perspectives and choices  Outcome: Progressing     Problem: Patient/Family Goals  Goal: Patient/Family Long Term Goal  Description: Patient's Long Term Goal: Feel better    Interventions:  - Educate on taking medications as prescribed  - Monitor vitals  -Up as tolerated  - See additional Care Plan goals for specific interventions  Outcome: Progressing  Goal: Patient/Family Short Term Goal  Description: Patient's Short Term Goal: No syncopal episodes    Interventions:   - EKG  - EEG  - Neurology consult  - Monitor vitals  - MRI  - See additional Care Plan goals for specific interventions  Outcome: Progressing     Problem: NEUROLOGICAL - ADULT  Goal: Achieves stable or improved neurological status  Description: INTERVENTIONS  - Assess for and report changes in neurological status  - Initiate measures to prevent increased intracranial pressure  - Maintain blood pressure and fluid volume within ordered parameters to optimize cerebral perfusion and minimize risk of hemorrhage  - Monitor temperature, glucose, and sodium. Initiate appropriate interventions as ordered  Outcome:  Progressing  Goal: Absence of seizures  Description: INTERVENTIONS  - Monitor for seizure activity  - Administer anti-seizure medications as ordered  - Monitor neurological status  Outcome: Progressing  Goal: Remains free of injury related to seizure activity  Description: INTERVENTIONS:  - Maintain airway, patient safety  and administer oxygen as ordered  - Monitor patient for seizure activity, document and report duration and description of seizure to MD/LIP  - If seizure occurs, turn patient to side and suction secretions as needed  - Reorient patient post seizure  - Seizure pads on all 4 side rails  - Instruct patient/family to notify RN of any seizure activity  - Instruct patient/family to call for assistance with activity based on assessment  Outcome: Progressing  Goal: Achieves maximal functionality and self care  Description: INTERVENTIONS  - Monitor swallowing and airway patency with patient fatigue and changes in neurological status  - Encourage and assist patient to increase activity and self care with guidance from PT/OT  - Encourage visually impaired, hearing impaired and aphasic patients to use assistive/communication devices  Outcome: Progressing     Problem: Impaired Cognition  Goal: Patient will exhibit improved attention, thought processing and/or memory  Description: Interventions:  Outcome: Progressing     Problem: PAIN - ADULT  Goal: Verbalizes/displays adequate comfort level or patient's stated pain goal  Description: INTERVENTIONS:  - Encourage pt to monitor pain and request assistance  - Assess pain using appropriate pain scale  - Administer analgesics based on type and severity of pain and evaluate response  - Implement non-pharmacological measures as appropriate and evaluate response  - Consider cultural and social influences on pain and pain management  - Manage/alleviate anxiety  - Utilize distraction and/or relaxation techniques  - Monitor for opioid side effects  - Notify MD/LIP if  interventions unsuccessful or patient reports new pain  - Anticipate increased pain with activity and pre-medicate as appropriate  Outcome: Progressing     Problem: SAFETY ADULT - FALL  Goal: Free from fall injury  Description: INTERVENTIONS:  - Assess pt frequently for physical needs  - Identify cognitive and physical deficits and behaviors that affect risk of falls.  - [place] fall precautions as indicated by assessment.  - Educate pt/family on patient safety including physical limitations  - Instruct pt to call for assistance with activity based on assessment  - Modify environment to reduce risk of injury  - Provide assistive devices as appropriate  - Consider OT/PT consult to assist with strengthening/mobility  - Encourage toileting schedule  Outcome: Progressing     Problem: DISCHARGE PLANNING  Goal: Discharge to home or other facility with appropriate resources  Description: INTERVENTIONS:  - Identify barriers to discharge w/pt and caregiver  - Include patient/family/discharge partner in discharge planning  - Arrange for needed discharge resources and transportation as appropriate  - Identify discharge learning needs (meds, wound care, etc)  - Arrange for interpreters to assist at discharge as needed  - Consider post-discharge preferences of patient/family/discharge partner  - Complete POLST form as appropriate  - Assess patient's ability to be responsible for managing their own health  - Refer to Case Management Department for coordinating discharge planning if the patient needs post-hospital services based on physician/LIP order or complex needs related to functional status, cognitive ability or social support system  Outcome: Progressing

## 2025-03-05 LAB
ANION GAP SERPL CALC-SCNC: 11 MMOL/L (ref 0–18)
BUN BLD-MCNC: 22 MG/DL (ref 9–23)
BUN/CREAT SERPL: 41.5 (ref 10–20)
CALCIUM BLD-MCNC: 8.2 MG/DL (ref 8.7–10.4)
CHLORIDE SERPL-SCNC: 111 MMOL/L (ref 98–112)
CO2 SERPL-SCNC: 21 MMOL/L (ref 21–32)
CREAT BLD-MCNC: 0.53 MG/DL
EGFRCR SERPLBLD CKD-EPI 2021: 96 ML/MIN/1.73M2 (ref 60–?)
GLUCOSE BLD-MCNC: 130 MG/DL (ref 70–99)
OSMOLALITY SERPL CALC.SUM OF ELEC: 301 MOSM/KG (ref 275–295)
POTASSIUM SERPL-SCNC: 3.9 MMOL/L (ref 3.5–5.1)
SODIUM SERPL-SCNC: 143 MMOL/L (ref 136–145)

## 2025-03-05 PROCEDURE — 99233 SBSQ HOSP IP/OBS HIGH 50: CPT | Performed by: INTERNAL MEDICINE

## 2025-03-05 RX ORDER — DEXAMETHASONE 4 MG/1
4 TABLET ORAL EVERY 8 HOURS SCHEDULED
Status: DISCONTINUED | OUTPATIENT
Start: 2025-03-05 | End: 2025-03-05

## 2025-03-05 RX ORDER — LOPERAMIDE HYDROCHLORIDE 2 MG/1
4 CAPSULE ORAL 4 TIMES DAILY PRN
Status: DISCONTINUED | OUTPATIENT
Start: 2025-03-05 | End: 2025-03-09

## 2025-03-05 RX ORDER — DEXAMETHASONE 4 MG/1
4 TABLET ORAL EVERY 8 HOURS SCHEDULED
Status: DISCONTINUED | OUTPATIENT
Start: 2025-03-05 | End: 2025-03-09

## 2025-03-05 NOTE — PROGRESS NOTES
Palliative Care Progress Note    Damari Hong Patient Status:  Inpatient    1950 MRN [id number]   Location [facility] 4W/SW/SE Attending Jose Carlos Mejia MD   Hosp Day # 13 PCP Shruthi Simon MD     Date of Consult: 2025  Patient seen at: [facility]    Reason for Consultation: Consult requested for goals of care and care coordination.    Subjective     S: More awake/alert. Eating well, surrounded by family. Intermittent loose stool. More restlessness than yesterday.    Review of Systems: Palliative Care symptom needs assessed:     Unable to accurately obtain due to altered mental status     Palliative Care Social History:   Marital Status:    Children: Yes  Living Situation Prior to Admit: Home  Occupational History: Retired  Personal:     Spiritual  Damari XIONG Gely SANCHEZ    requested: No    Medical History: obtained from [facility]  Past Medical History:    Depression    off Cymbalta    Esophageal reflux    HYPERLIPIDEMIA    Lung cancer (HCC)    with brain mets    OSTEOARTHRITIS    OTHER DISEASES    Hashimoto's--sees Dr. Palmer--Endo    OTHER DISEASES    DEXA-WNL     Reflux    Thyroid disease     Past Surgical History:   Procedure Laterality Date    Appendectomy      Colonoscopy      -Dr. Rodriges--colonoscopy-normal--next 2018    Hysterectomy      partial hysterectomy-still with bilateral ovaries--due to proloapsed uterus--PAP's always normal    Knee replacement surgery      L-knee TKP-Dr. Carney '    Laparoscopic  2014    Procedure: LAPAROSCOPIC CHOLECYSTECTOMY WITH  CHOLANGIOGRAM   POSS OPEN;  Surgeon: Monik Simon MD;  [facility]    Other surgical history      Arthroscopy    Other surgical history      both knee replacement    Pleurx catheter         Family History: obtained from [facility]  Family History   Problem Relation Age of Onset    Cancer Father         EtOH, Liver Dz    Cancer Mother         colon cancer and  Alzheimer;s-diagnosed 68       Allergies:  Allergies[1]    Medications:     Current Facility-Administered Medications:     dexamethasone (Decadron) tab 4 mg, 4 mg, Oral, Q8H ROSIE    loperamide (Imodium) cap 4 mg, 4 mg, Oral, QID PRN    guaiFENesin (Robitussin) 100 MG/5 ML oral liquid 100 mg, 100 mg, Oral, Q4H PRN    Osimertinib Mesylate (Tagrisso) TABS 80 mg - PATIENTS OWN MEDICATION, 80 mg, Oral, Daily    apixaban (Eliquis) tab 5 mg, 5 mg, Oral, BID    acetaminophen (Tylenol) tab 650 mg, 650 mg, Oral, Q4H PRN **OR** acetaminophen (Tylenol) rectal suppository 650 mg, 650 mg, Rectal, Q4H PRN    labetalol (Trandate) 5 mg/mL injection 10 mg, 10 mg, Intravenous, Q10 Min PRN    hydrALAzine (Apresoline) 20 mg/mL injection 10 mg, 10 mg, Intravenous, Q2H PRN    famotidine (Pepcid) tab 20 mg, 20 mg, Oral, Daily **OR** famotidine (Pepcid) 20 mg/2mL injection 20 mg, 20 mg, Intravenous, Daily    ondansetron (Zofran) 4 MG/2ML injection 4 mg, 4 mg, Intravenous, Q6H PRN **OR** metoclopramide (Reglan) 5 mg/mL injection 10 mg, 10 mg, Intravenous, Q8H PRN    albuterol (Ventolin HFA) 108 (90 Base) MCG/ACT inhaler 1 puff, 1 puff, Inhalation, Q6H PRN    levothyroxine (Synthroid) tab 50 mcg, 50 mcg, Oral, Daily    levETIRAcetam (Keppra) 500 mg/5mL injection 500 mg, 500 mg, Intravenous, Q12H  No current outpatient medications on file.         Palliative Performance Scale: 10 %  % Ambulation Activity Level Self-Care Intake Consciousness   100 Full  Normal  No Disease Full Normal Full   90 Full  Normal  Some Disease Full Normal Full   80 Full  Normal w/effort  Some Disease Full Normal or reduced Full   70 Reduced  Can't Perform Job  Some Disease Full Normal or reduced Full   60 Reduced  Can't Perform Hobby   Significant Disease Occ Assist Normal or reduced Full or confused   50 Mainly sit/lie Can't do any work  Extensive Disease Partial Assist Normal or reduced Full or confused   40 Mainly in bed Can't do any work  Extensive Disease Mainly  Assist Normal or reduced Full or confused   30 Bed Bound Can't do any work  Extensive Disease Max Assist  Total Care Reduced  Drowsy/confused   20 Bed Bound Can't do any work  Extensive Disease Max Assist  Total Care Minimal  Drowsy/confused   10 Bed Bound Can't do any work  Extensive Disease Max Assist  Total Care Mouth Care  Drowsy/confused   0 Death        Objective      Vital Signs:  Blood pressure (!) 134/91, pulse 100, temperature 96.8 °F (36 °C), temperature source Oral, resp. rate 18, height 5' 3\" (1.6 m), weight 134 lb (60.8 kg), SpO2 94%, not currently breastfeeding.  Body mass index is 23.74 kg/m².  Non-verbal signs of pain present: Yes    Physical Exam:  General: Awake, comfortable  HEENT: MMM.   Cardiac: RRR  Lungs: Normal effort on RA  Abdomen: Soft, non-distended  Extremities:  no  Edema present  Skin: Warm and dry.    Hematology:  Lab Results   Component Value Date    WBC 6.1 03/04/2025    HGB 11.7 (L) 03/04/2025    HCT 32.5 (L) 03/04/2025    .0 (L) 03/04/2025       Coags:  Lab Results   Component Value Date    INR 1.0 09/21/2010       Chemistry:  Lab Results   Component Value Date    CREATSERUM 0.53 (L) 03/05/2025    BUN 22 03/05/2025     03/05/2025    K 3.9 03/05/2025     03/05/2025    CO2 21.0 03/05/2025     (H) 03/05/2025    CA 8.2 (L) 03/05/2025    ALB 4.8 03/23/2022    ALKPHO 106 03/23/2022    BILT 0.52 03/23/2022    TP 7.4 03/23/2022    AST 22 03/23/2022    ALT 19 03/23/2022    MG 2.0 03/01/2025       Imaging:  XR CHEST AP PORTABLE  (CPT=71045)    Result Date: 3/4/2025  CONCLUSION:  1. Right-sided PleurX catheter in place.  Stable loculated right pleural effusion and poor aeration of the right lung with stable ground-glass attenuation opacity in right lung which may be related to interstitial spread of tumor, edema, or pneumonia. 2. Left lung remains clear. 3. Right-sided Port-A-Cath with tip in superior right atrium unchanged.     Dictated by (CST): Nasir Jamil MD  on 3/04/2025 at 6:29 PM     Finalized by (CST): Nasir Jamil MD on 3/04/2025 at 6:32 PM           Assessment and Recommendations        Delirium, acute    Dehydration    Primary malignant neoplasm of lung metastatic to other site, unspecified laterality (HCC)    Intracranial hemorrhage (HCC)    Acute hemorrhagic infarction of brain (HCC)    Symptom Management      Pain:  -has not traditionally had pain  -msk, back pain  -tylenol for headache, can consider fioricet    Agitation  -on and off, possible effect of steroid     Dyspnea:  -on room air now, previously on home 02  -due for pleurex drainage    Cachexia:  -previously on megace  -has had severely diminished caloric intake over last several weeks, sons estimate maybe 2-300 calories    Somnolence  -will stop prednisone in favor of decadron  -4mg PO TID    Diarrhea  -side effect of tigresso   -imodium 4mg QID Prn   -increase as needed       2.     Advanced Care Planning  A voluntarily discussion and explanation regarding Advance Care Planning (ACP) took place today with patient and son. We discussed the risks vs benefits of life sustaining treatments in the setting of Damari Hong's comorbid medical conditions. Items addressed: patient preferences , code status , end-of-life care plan, and general prognosis . This resulted in a better understanding of ACP documentation , a better understanding of pt's expressed wishes/preferences , confirmation of code status , and family discussions to continue privately .   Summary:     Total time dedicated to ACP >16 minutes.       3.     Serious Illness Conversation:   Code Status: DNAR  Met with Damari and sons Romario and Brayan. Unfortunately at the time of my meeting she appears obtunded and minimally arousable. She will nod and voice answers to simple questions but doesn't open her eyes.   I spoke with her outside oncologist Dr. Monzon who is in contact with her sons. We will plan to try to optimize her performance,  however if her mental status does not improve, we may plan to pivot to comfort care/hospice.  Leti is more somnolent and restless. She is oriented upon arousal. I explained to Brayan that everything we do at this point to try to optimize her is sort of a hail ronni. We can treat restlessness, but it works against her alertness/arousal that we want for her to continue to rehab/cancer treatment.   I personally feel that the family should bring her home with hospice services and I posed this question to him: 'if she has a prognosis of short weeks, how do you want her cared for?' He said he will consider this question with siblings.   Plan continues to be SHAR/SNF with oral anti-tumor agent from her outpatient oncologist.   2/28 Family is vigilant for any effect of the antitumor agent. Unfortunately her cognition seems to be declining or stably poor.  Dr. Monzon to speak to them this afternoon.  She meets inpatient hospice criteria.   Very pleasant improvement today, still advising ongoing care planning with adult children. Will message her OP oncologist.   Leti is doing well but weak and waxing/waning mental status/oral intake/restlessness.  Sons pulled me aside out of the room and asked what can be expected realistically as far as rehab/improvement is concerned.   I responded that Dr. Monzon is more likely to be able to respond. I personally explained that in my experience with palliative intent treatment of brain metastases, it is realistic to expect a reprieve or slowing of progression, but not likely a return to a previous baseline.   I recommended having a low threshhold for hospice enrollment and a plan in place of what to do if she 1) does not improve after rehab or 2) has an emergency there and a decision for hospitalization has to be made.  They are planning on SHAR and video follow up with their oncologist.    Palliative Care Follow Up: Palliative care team will Continue to follow while inpatient    Thank you for  allowing Palliative Care services to participate in the care of Damari Hong.    Medical Decision Making (MDM) for Palliative Care   Problems Addressed:   Details: Cancer with brain mets  Cancer related fatigue  Malnourishment  Data Reviewed & Analyzed:  External notes reviewed from each unique source:    Results reviewed from each unique test:    Unique tests ordered:    Select all that apply:    Details:    Risk of Complications from DIagnostic Testing & Treatment:   Details: - Drug therapy requiring intensive monitoring for toxicity    - Decision regarding hospitalization or escalation of hospital level of care   - Decision not to resuscitate or to de-escalate care because of poor prognosis   - Parenteral controlled substances      Calculated MDM Level: High      Richard Fox MD  Palliative Care Services  [facility] [phone]    Note to patient:  The 21st Century Cures Act makes medical notes like these available to patients in the interest of transparency. However, be advised this is a medical document. It is intended as peer to peer communication. It is written in medical language and may contain abbreviations or verbiage that are unfamiliar. It may appear blunt or direct. Medical documents are intended to carry relevant information, facts as evident, and the clinical opinion of the practitioner.           [1] No Known Allergies

## 2025-03-05 NOTE — PROGRESS NOTES
03/05/25 1424   VISIT TYPE   SLP Inpatient Visit Type (Documentation Required) Attempted Treatment  (SLP attempted swallow f/u, pt not awake enough for safe po. Will re attempt 3/6, d/w pt's family.)   FOLLOW UP/PLAN   Follow Up Needed (Documentation Required) Yes   SLP Follow-up Date 03/06/25     Kourtney Lujan M.S. CCC-SLP  Speech Language Pathologist  Phone Number Ext. [phone]

## 2025-03-05 NOTE — PLAN OF CARE
No acute changes over night. Pt is lethargic and alert. Pt is on Ra. Purewick in place. No BM over night. Pt denies pain. Pleurx in place. Pt is a max assist. Pt is tolerating a General diet with honey thick liquids and chopped soft bite size easy to chew. Plan is to discharge to rehab once cleared.     Problem: NEUROLOGICAL - ADULT  Goal: Achieves stable or improved neurological status  Description: INTERVENTIONS  - Assess for and report changes in neurological status  - Initiate measures to prevent increased intracranial pressure  - Maintain blood pressure and fluid volume within ordered parameters to optimize cerebral perfusion and minimize risk of hemorrhage  - Monitor temperature, glucose, and sodium. Initiate appropriate interventions as ordered  Outcome: Progressing  Goal: Absence of seizures  Description: INTERVENTIONS  - Monitor for seizure activity  - Administer anti-seizure medications as ordered  - Monitor neurological status  Outcome: Progressing  Goal: Remains free of injury related to seizure activity  Description: INTERVENTIONS:  - Maintain airway, patient safety  and administer oxygen as ordered  - Monitor patient for seizure activity, document and report duration and description of seizure to MD/LIP  - If seizure occurs, turn patient to side and suction secretions as needed  - Reorient patient post seizure  - Seizure pads on all 4 side rails  - Instruct patient/family to notify RN of any seizure activity  - Instruct patient/family to call for assistance with activity based on assessment  Outcome: Progressing  Goal: Achieves maximal functionality and self care  Description: INTERVENTIONS  - Monitor swallowing and airway patency with patient fatigue and changes in neurological status  - Encourage and assist patient to increase activity and self care with guidance from PT/OT  - Encourage visually impaired, hearing impaired and aphasic patients to use assistive/communication devices  Outcome:  Progressing     Problem: Impaired Cognition  Goal: Patient will exhibit improved attention, thought processing and/or memory  Description: Interventions:    Outcome: Progressing     Problem: PAIN - ADULT  Goal: Verbalizes/displays adequate comfort level or patient's stated pain goal  Description: INTERVENTIONS:  - Encourage pt to monitor pain and request assistance  - Assess pain using appropriate pain scale  - Administer analgesics based on type and severity of pain and evaluate response  - Implement non-pharmacological measures as appropriate and evaluate response  - Consider cultural and social influences on pain and pain management  - Manage/alleviate anxiety  - Utilize distraction and/or relaxation techniques  - Monitor for opioid side effects  - Notify MD/LIP if interventions unsuccessful or patient reports new pain  - Anticipate increased pain with activity and pre-medicate as appropriate  Outcome: Progressing     Problem: SAFETY ADULT - FALL  Goal: Free from fall injury  Description: INTERVENTIONS:  - Assess pt frequently for physical needs  - Identify cognitive and physical deficits and behaviors that affect risk of falls.  - [place] fall precautions as indicated by assessment.  - Educate pt/family on patient safety including physical limitations  - Instruct pt to call for assistance with activity based on assessment  - Modify environment to reduce risk of injury  - Provide assistive devices as appropriate  - Consider OT/PT consult to assist with strengthening/mobility  - Encourage toileting schedule  Outcome: Progressing     Problem: DISCHARGE PLANNING  Goal: Discharge to home or other facility with appropriate resources  Description: INTERVENTIONS:  - Identify barriers to discharge w/pt and caregiver  - Include patient/family/discharge partner in discharge planning  - Arrange for needed discharge resources and transportation as appropriate  - Identify discharge learning needs (meds, wound care, etc)  -  Arrange for interpreters to assist at discharge as needed  - Consider post-discharge preferences of patient/family/discharge partner  - Complete POLST form as appropriate  - Assess patient's ability to be responsible for managing their own health  - Refer to Case Management Department for coordinating discharge planning if the patient needs post-hospital services based on physician/LIP order or complex needs related to functional status, cognitive ability or social support system  Outcome: Progressing

## 2025-03-05 NOTE — PROGRESS NOTES
[facility] Hospitalist Progress Note     CC: Hospital Follow up    PCP: Shruthi Simon MD       Assessment/Plan:     Principal Problem:    Delirium, acute  Active Problems:    Dehydration    Primary malignant neoplasm of lung metastatic to other site, unspecified laterality (HCC)    Intracranial hemorrhage (HCC)    Acute hemorrhagic infarction of brain (HCC)    Palliative care by specialist      Ms. 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    Overall per family her mental status is improving and she seems more comfortable when at rest.  Less agitated.     L sided weakness  New right paramedian midbrain metastatic lesion  Syncope  - noted on CT head  - hold Eliquis, ok to restart Eliquis per per NSGY  - MRI brain without bleeding noted  - NSGY and neurology consulted  - continue keppra  - stop neurochecks  - PT/OT/SW  - per neurology, 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 Monzon from [facility] he is awaiting approval of an oral immunotherapy that may have some benefit, but not curative,  -palliative care eval  -trial of decadron, was agitated 2/27 palliative stopped provigil  -2/28 appears worse was agitated last night, sleepy this morning after IV pain meds  -continue to monitor, more confused and lethargic last week, seems to be improving with decadron and Tagrisso  -Now improving over the weekend.  Family would like to get her to rehab and continue her oral chemo agent.  They  understand that she may continue to decline and be hospice appropriate in the near future.  -Discussed with her oncologist at [facility], will transition to oral Decadron here.  Then plan for 4-week taper at discharge.      Lung Ca with recurrent malignant pleural effusion  PE hx   Small R PTX - POA   Chronic hypoxic respiratory failure on home O2  - S/p R sided PleurX placement 1/31/25  - drain scheduled MWF 9:30AM, up to 1L  - Already has 2L O2 at home - at baseline now   -Most recent chest x-ray with good placement of pleurx, however has not been draining as much, will get repeat chest x-ray today, continue to drain on schedule    Lung cancer with brain mets  - follow-up oncology as outpatient  - planned to start chemotherapy this week    - per family, had recent MRI brain a few weeks ago that showed decrease in size of brain mets  - has been on prednisone taper since November, currently on 10 mg daily-> now on decadron  - tegresso was ordered, Dr. Monzon from  wants her to start this, medication has been approved will start 2/27 per Dr. Monzon with oncology -> spit out medication on 2/27, was able to take 2/28 and 3/1 in pudding  -Will message her [facility] oncologist for Decadron taper    DVT/PE  - restart Eliquis   - 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    Hypothyroidism  - Continue synthroid     ACP  - CODE- DNR/select- updated today with POA  - POA- son Brayan updated at bedside   - lives at home with son     FN:  - IVF: stopped IVF  - Diet: regular     DVT Prophy: SCD, eliquis   Lines: port     Dispo: pending course; family discussing hospice pending response with [facility]    Discussed with daughter at bedside, other family numbers are touring facilities     Further recommendations pending patient's clinical course.  [facility] hospitalist to continue to follow patient while in house     Patient and/or patient's family given opportunity to ask questions and note understanding and agreeing with  therapeutic plan as outlined    Note: This chart was prepared using voice recognition software and may contain unintended word substitution errors.     Amelia Schaefer MD  Hospitalist  [facility]   Answering service: [phone]       Subjective:     Awake for me this morning.  Pleasant, tends to let family answer all the questions.    OBJECTIVE:    Blood pressure (!) 134/91, pulse 100, temperature 96.8 °F (36 °C), temperature source Oral, resp. rate 18, height 5' 3\" (1.6 m), weight 134 lb (60.8 kg), SpO2 94%, not currently breastfeeding.    Temp:  [96.8 °F (36 °C)-97.5 °F (36.4 °C)] 96.8 °F (36 °C)  Pulse:  [] 100  Resp:  [18] 18  BP: (108-134)/(69-91) 134/91  SpO2:  [94 %-95 %] 94 %      Intake/Output:    Intake/Output Summary (Last 24 hours) at 3/5/2025 1600  Last data filed at 3/5/2025 1034  Gross per 24 hour   Intake 100 ml   Output 200 ml   Net -100 ml       Last 3 Weights   03/04/25 0446 134 lb (60.8 kg)   02/25/25 0507 128 lb (58.1 kg)   02/21/25 1200 126 lb 8.7 oz (57.4 kg)   02/20/25 2050 128 lb 1.4 oz (58.1 kg)   02/20/25 1146 162 lb 0.6 oz (73.5 kg)   03/23/22 0942 162 lb (73.5 kg)   01/12/22 1248 160 lb (72.6 kg)       Exam  GEN: sleeping, was more awake earlier today   HEENT: EOMI  Pulm: decreased breath sounds R side, no increased work of breathing  CV: RRR, no murmurs  ABD: Soft, non-tender, non-distended, +BS  Neuro: LUE/LLE weakness, follows commands      Data Review:       Labs:     Recent Labs   Lab 03/01/25  0432 03/03/25  0849 03/04/25  0644   RBC 3.00* 3.00* 3.34*   HGB 10.2* 10.6* 11.7*   HCT 28.9* 28.9* 32.5*   MCV 96.3 96.3 97.3   MCH 34.0 35.3* 35.0*   MCHC 35.3 36.7 36.0   RDW 12.6 12.9 13.0   NEPRELIM 3.91  --   --    WBC 4.7 5.5 6.1   .0 131.0* 115.0*         Recent Labs   Lab 03/03/25  0849 03/04/25  0644 03/05/25  0632   * 134* 130*   BUN 21 21 22   CREATSERUM 0.52* 0.56 0.53*   EGFRCR 97 95 96   CA 8.0* 8.2* 8.2*    144 143   K 3.8 3.9  3.9 3.9     112 111   CO2 21.0 20.0* 21.0       No results for input(s): \"ALT\", \"AST\", \"ALB\", \"AMYLASE\", \"LIPASE\", \"LDH\" in the last 168 hours.    Invalid input(s): \"ALPHOS\", \"TBIL\", \"DBIL\", \"TPROT\"      Imaging:  XR CHEST AP PORTABLE  (CPT=71045)    Result Date: 3/4/2025  CONCLUSION:  1. Right-sided PleurX catheter in place.  Stable loculated right pleural effusion and poor aeration of the right lung with stable ground-glass attenuation opacity in right lung which may be related to interstitial spread of tumor, edema, or pneumonia. 2. Left lung remains clear. 3. Right-sided Port-A-Cath with tip in superior right atrium unchanged.     Dictated by (CST): Nasir Jamil MD on 3/04/2025 at 6:29 PM     Finalized by (CST): Nasir Jamil MD on 3/04/2025 at 6:32 PM             Meds:     INPATIENT MEDICATIONS    Scheduled Medications:      dexamethasone, 4 mg, Q8H ROSIE  Osimertinib Mesylate, 80 mg, Daily  apixaban, 5 mg, BID  famotidine, 20 mg, Daily   Or  famotidine, 20 mg, Daily  levothyroxine, 50 mcg, Daily  levETIRAcetam, 500 mg, Q12H            Drips:           PRN Medications  loperamide, 4 mg, QID PRN  guaiFENesin, 100 mg, Q4H PRN  acetaminophen, 650 mg, Q4H PRN   Or  acetaminophen, 650 mg, Q4H PRN  labetalol, 10 mg, Q10 Min PRN  hydrALAzine, 10 mg, Q2H PRN  ondansetron, 4 mg, Q6H PRN   Or  metoclopramide, 10 mg, Q8H PRN  albuterol, 1 puff, Q6H PRN

## 2025-03-05 NOTE — PLAN OF CARE
Drowsy. Soft spoken, only says a few words, mostly gives 'thumbs up' for yes/no questions. PleurX in place clean/dry/intact, drained today per orders with 200mL of serosanguinous fluid removed, pt tolerated well. Max assistance. Repositioned every 2 hours. Purewick in place. Incontinent BM. Plan to discharge to rehab, family still hasn't chose facility, encouraged to make choice. Bed in lowest position, call light in reach, frequent rounding, nonskid footwear, fall precautions in place.

## 2025-03-06 NOTE — CM/SW NOTE
CM rec'd vm from son Brayan with [facility].    #1 [facility]appell  #2 Alfred [facility]ahy  #3 Gael Apple    Cm sent msg to Bianca to confirm bed is available.     CM req upd PT & OT notes    CM req DSC to start [employer] ins auth once upd notes are available    Brayan upd with above    1230  PT OT upd pending    1420  Upd therapy notes pending    1520  Upd therapy notes entered, DSC to send for [employer] ins auth.    Plan  [facility] pending auth    / to remain available for support and/or discharge planning.     Parris Vergara, RN    Ext [phone]

## 2025-03-06 NOTE — PROGRESS NOTES
[facility] Hospitalist Progress Note     CC: Hospital Follow up    PCP: Shruthi Simon MD       Assessment/Plan:     Principal Problem:    Delirium, acute  Active Problems:    Dehydration    Primary malignant neoplasm of lung metastatic to other site, unspecified laterality (HCC)    Intracranial hemorrhage (HCC)    Acute hemorrhagic infarction of brain (HCC)    Palliative care by specialist      Ms. 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    Overall per family her mental status is improving and she seems more comfortable when at rest.  Due to medical history, railings needed and  used for seizure precaution and safety.       L sided weakness  New right paramedian midbrain metastatic lesion  Syncope  - noted on CT head  - hold Eliquis, ok to restart Eliquis per per NSGY  - MRI brain without bleeding noted  - NSGY and neurology consulted  - continue keppra  - stop neurochecks  - PT/OT/SW  - per neurology, 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 Monzon from [facility] he is awaiting approval of an oral immunotherapy that may have some benefit, but not curative,  -palliative care eval  -trial of decadron, was agitated 2/27 palliative stopped provigil  -2/28 appears worse was agitated last night, sleepy this morning after IV pain meds  -continue to monitor, more confused and lethargic last week, seems to be improving with decadron and Tagrisso  -Now improving over the weekend.  Family  would like to get her to rehab and continue her oral chemo agent.  They understand that she may continue to decline and be hospice appropriate in the near future.  -Discussed with her oncologist at [facility], will transition to oral Decadron here.  Then plan for 4-week taper at discharge.      Lung Ca with recurrent malignant pleural effusion  PE hx   Small R PTX - POA   Chronic hypoxic respiratory failure on home O2  - S/p R sided PleurX placement 1/31/25  - drain scheduled MWF 9:30AM, up to 1L  - Already has 2L O2 at home - at baseline now   -Most recent chest x-ray with good placement of pleurx, however has not been draining as much, will get repeat chest x-ray today, continue to drain on schedule    Lung cancer with brain mets  - follow-up oncology as outpatient  - planned to start chemotherapy this week    - per family, had recent MRI brain a few weeks ago that showed decrease in size of brain mets  - has been on prednisone taper since November, currently on 10 mg daily-> now on decadron  - tegresso was ordered, Dr. Monzon from  wants her to start this, medication has been approved will start 2/27 per Dr. Monzon with oncology -> spit out medication on 2/27, was able to take 2/28 and 3/1 in pudding  -Will message her [facility] oncologist for Decadron taper    DVT/PE  - restart Eliquis   - 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    Hypothyroidism  - Continue synthroid     ACP  - CODE- DNR/select- updated today with POA  - POA- son Brayan updated at bedside   - lives at home with son     FN:  - IVF: stopped IVF  - Diet: regular     DVT Prophy: SCD, eliquis   Lines: port     Dispo: pending course; plan for [facility]      Dw family at bedside      Further recommendations pending patient's clinical course.  [facility] hospitalist to continue to follow patient while in house     Patient and/or patient's family given opportunity to ask questions and note understanding and agreeing with therapeutic plan as  outlined    Note: This chart was prepared using voice recognition software and may contain unintended word substitution errors.     Medically ok for dc to rehab once auth approved     Amelia Schaefer MD  Hospitalist  [facility]   Answering service: [phone]       Subjective:     A little fmore restless overnight but periods of wakefulness she is doing well, cooperative and interacting appropriately with family and staff.  Not requiring any prn meds    OBJECTIVE:    Blood pressure 114/82, pulse 102, temperature 96.5 °F (35.8 °C), temperature source Axillary, resp. rate 18, height 5' 3\" (1.6 m), weight 134 lb (60.8 kg), SpO2 90%, not currently breastfeeding.    Temp:  [96.5 °F (35.8 °C)-97.6 °F (36.4 °C)] 96.5 °F (35.8 °C)  Pulse:  [] 102  Resp:  [18] 18  BP: (113-123)/(72-82) 114/82  SpO2:  [90 %-95 %] 90 %      Intake/Output:    Intake/Output Summary (Last 24 hours) at 3/6/2025 1055  Last data filed at 3/6/2025 0930  Gross per 24 hour   Intake 120 ml   Output 250 ml   Net -130 ml       Last 3 Weights   03/04/25 0446 134 lb (60.8 kg)   02/25/25 0507 128 lb (58.1 kg)   02/21/25 1200 126 lb 8.7 oz (57.4 kg)   02/20/25 2050 128 lb 1.4 oz (58.1 kg)   02/20/25 1146 162 lb 0.6 oz (73.5 kg)   03/23/22 0942 162 lb (73.5 kg)   01/12/22 1248 160 lb (72.6 kg)       Exam  GEN: awake, cooperative   HEENT: EOMI  Pulm: decreased breath sounds R side, no increased work of breathing  CV: RRR, no murmurs  ABD: Soft, non-tender, non-distended, +BS  Neuro: LUE/LLE weakness, follows commands      Data Review:       Labs:     Recent Labs   Lab 03/01/25  0432 03/03/25  0849 03/04/25  0644   RBC 3.00* 3.00* 3.34*   HGB 10.2* 10.6* 11.7*   HCT 28.9* 28.9* 32.5*   MCV 96.3 96.3 97.3   MCH 34.0 35.3* 35.0*   MCHC 35.3 36.7 36.0   RDW 12.6 12.9 13.0   NEPRELIM 3.91  --   --    WBC 4.7 5.5 6.1   .0 131.0* 115.0*         Recent Labs   Lab 03/03/25  0849 03/04/25  0644 03/05/25  0632   * 134* 130*   BUN 21 21 22    CREATSERUM 0.52* 0.56 0.53*   EGFRCR 97 95 96   CA 8.0* 8.2* 8.2*    144 143   K 3.8 3.9  3.9 3.9    112 111   CO2 21.0 20.0* 21.0       No results for input(s): \"ALT\", \"AST\", \"ALB\", \"AMYLASE\", \"LIPASE\", \"LDH\" in the last 168 hours.    Invalid input(s): \"ALPHOS\", \"TBIL\", \"DBIL\", \"TPROT\"      Imaging:  XR CHEST AP PORTABLE  (CPT=71045)    Result Date: 3/4/2025  CONCLUSION:  1. Right-sided PleurX catheter in place.  Stable loculated right pleural effusion and poor aeration of the right lung with stable ground-glass attenuation opacity in right lung which may be related to interstitial spread of tumor, edema, or pneumonia. 2. Left lung remains clear. 3. Right-sided Port-A-Cath with tip in superior right atrium unchanged.     Dictated by (CST): Nasir Jamil MD on 3/04/2025 at 6:29 PM     Finalized by (CST): Nasir Jamil MD on 3/04/2025 at 6:32 PM             Meds:     INPATIENT MEDICATIONS    Scheduled Medications:      dexamethasone, 4 mg, Q8H ROSIE  Osimertinib Mesylate, 80 mg, Daily  apixaban, 5 mg, BID  famotidine, 20 mg, Daily   Or  famotidine, 20 mg, Daily  levothyroxine, 50 mcg, Daily  levETIRAcetam, 500 mg, Q12H            Drips:           PRN Medications  loperamide, 4 mg, QID PRN  guaiFENesin, 100 mg, Q4H PRN  acetaminophen, 650 mg, Q4H PRN   Or  acetaminophen, 650 mg, Q4H PRN  labetalol, 10 mg, Q10 Min PRN  hydrALAzine, 10 mg, Q2H PRN  ondansetron, 4 mg, Q6H PRN   Or  metoclopramide, 10 mg, Q8H PRN  albuterol, 1 puff, Q6H PRN

## 2025-03-06 NOTE — PAYOR COMM NOTE
--------------  CONTINUED STAY REVIEW    Payor: [facility] MEDICARE ADV PPO  Subscriber #:  [id number]  Authorization Number: [id number]    Admit date: 2/20/25  Admit time:  8:41 PM    REVIEW DOCUMENTATION:  3/1/2025  Hospitalist Progress Note   Sleepy. No increased pain with stopping IV dilaudid. Has gotten 2 doses of Tagrisso     Temp:  [97.5 °F (36.4 °C)-98.6 °F (37 °C)] 97.5 °F (36.4 °C)  Pulse:  [92-99] 99  Resp:  [16-18] 16  BP: (124-154)/(80-94) 124/80  SpO2:  [94 %-96 %] 95 %    3/1/2025 1122      Gross per 24 hour   Intake --   Output 710 ml   Net -710 ml     Recent Labs   Lab 02/25/25  0445 02/26/25  0438 03/01/25  0432   RBC 3.13* 3.09* 3.00*   HGB 10.7* 11.0* 10.2*   HCT 30.6* 30.4* 28.9*   MCV 97.8 98.4 96.3   MCH 34.2* 35.6* 34.0   MCHC 35.0 36.2 35.3   RDW 13.0 12.8 12.6   NEPRELIM  --  3.83 3.91   WBC 4.7 4.6 4.7   .0* 160.0 188.0      GLU 97 147* 143*   BUN 12 16 24*   CREATSERUM 0.55 0.55 0.55   EGFRCR 96 96 96   CA 7.8* 8.1* 7.8*    142 145   K 3.6  3.6 4.3  4.3 3.7    110 113*   CO2 23.0 22.0 22.0      Assessment/Plan:   L sided weakness  New right paramedian midbrain metastatic lesion  Syncope  - noted on CT head  - hold Eliquis, ok to restart Eliquis per per NSGY  - MRI brain without bleeding noted  - NSGY and neurology consulted  - continue keppra  - stop neurochecks  - PT/OT/SW  - per neurology, 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 Monzon from [facility] he is awaiting approval of an oral immunotherapy that may have some benefit, but not curative,  -palliative care eval  -trial of decadron, was agitated 2/27 palliative stopped provigil  -2/28 appears worse was agitated last night, sleepy this morning after IV pain meds  -continue to monitor, more confused and lethargic  - hospice appropriate     Lung Ca with recurrent malignant pleural effusion  PE hx   Small R PTX - POA    Chronic hypoxic respiratory failure on home O2  - S/p R sided PleurX placement 1/31/25  - drain scheduled MWF 9:30AM, up to 1L  - Already has 2L O2 at home - at baseline now     Lung cancer with brain mets  - follow-up oncology as outpatient  - planned to start chemotherapy this week    - per family, had recent MRI brain a few weeks ago that showed decrease in size of brain mets  - has been on prednisone taper since November, currently on 10 mg daily-> now on decadron  - tegresso was ordered, Dr. Monzon from  wants her to start this, medication has been approved will start 2/27 per Dr. Monzon with oncology -> spit out medication on 2/27    DVT/PE  - restart Eliquis   - 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    Hypothyroidism  - Continue synthroid     ACP  - CODE- DNR/select- updated today with POA  - POA- son Brayan updated at bedside   - lives at home with son     FN:  - IVF: none  - Diet: regular     DVT Prophy: SCD, eliquis   Lines: port     Dispo: pending course; family discussing hospice pending response with Garrett Elam MD   3/1/2025  3:29 PM     3/2/2025  Hospitalist Progress Note   More alert today. Had a severe coughing fit this AM.     Temp:  [96.9 °F (36.1 °C)] 96.9 °F (36.1 °C)  Pulse:  [73-79] 73  Resp:  [17-26] 26  BP: (133-156)/(86-96) 133/86  SpO2:  [95 %-97 %] 97 %    3/2/2025 0559      Gross per 24 hour   Intake 550 ml   Output 300 ml   Net 250 ml     XR CHEST AP PORTABLE  (CPT=71045)   Result Date: 3/2/2025  CONCLUSION:          Large right pleural effusion with associated multifocal opacities in right lung, which may reflect atelectasis, pneumonitis, pulmonary edema, or pneumonia.  Well-positioned right pleural drainage catheter and right chest wall port.    Dispo: pending course; family discussing hospice pending response with Vanessa Leavitt MD   3/2/2025  1:54 PM     3/3/2025  Palliative Care Progress Note   Blood pressure 130/78, pulse 72,  temperature 98 °F (36.7 °C), Axillary, resp. rate 19, height 5' 3\" (1.6 m), weight 128 lb (58.1 kg), SpO2 96%,   Body mass index is 22.67 kg/m².    Non-verbal signs of pain present: Yes    Lab 02/26/25 0438 03/01/25  0432 03/03/25  0849   RBC 3.09* 3.00* 3.00*   HGB 11.0* 10.2* 10.6*   HCT 30.4* 28.9* 28.9*   MCV 98.4 96.3 96.3   MCH 35.6* 34.0 35.3*   MCHC 36.2 35.3 36.7   RDW 12.8 12.6 12.9   NEPRELIM 3.83 3.91  --    WBC 4.6 4.7 5.5   .0 188.0 131.0*     Lab 02/26/25 0438 03/01/25  0433 03/02/25  0649 03/03/25  0849   * 143*  --  118*   BUN 16 24*  --  21   CREATSERUM 0.55 0.55  --  0.52*   EGFRCR 96 96  --  97   CA 8.1* 7.8*  --  8.0*    145  --  142   K 4.3  4.3 3.7 4.0 3.8    113*  --  112   CO2 22.0 22.0  --  21.0     Assessment and Recommendations         Delirium, acute    Dehydration    Primary malignant neoplasm of lung metastatic to other site, unspecified laterality (HCC)    Intracranial hemorrhage (HCC)    Acute hemorrhagic infarction of brain (HCC)     Symptom Management                  Pain:  -has not traditionally had pain  -there is some brow-furrowing, indicating discomfort  -tylenol for headache, can consider fioricet     Agitation  -resolving     Dyspnea:  -on room air now, previously on home 02  -due for pleurex drainage     Cachexia:  -previously on megace  -has had severely diminished caloric intake over last several weeks, sons estimate maybe 2-300 calories     Somnolence  -will stop prednisone in favor of decadron  -4mg IV TID     Diarrhea  -side effect of tigresso               -schedule imodium 2mg BID               -increase as needed            Prevention of Constipation:                - Recommend Dulcolax suppository daily PRN     2.     Advanced Care Planning  A voluntarily discussion and explanation regarding Advance Care Planning (ACP) took place today with patient and son. We discussed the risks vs benefits of life sustaining treatments in the setting  of Damari Hong's comorbid medical conditions. Items addressed: patient preferences , code status , end-of-life care plan, and general prognosis . This resulted in a better understanding of ACP documentation , a better understanding of pt's expressed wishes/preferences , confirmation of code status , and family discussions to continue privately .   Summary:     Total time dedicated to ACP >16 minutes.         3.     Serious Illness Conversation:   Code Status: DNAR  Met with Damari and sons Romario and Brayan. Unfortunately at the time of my meeting she appears obtunded and minimally arousable. She will nod and voice answers to simple questions but doesn't open her eyes.   I spoke with her outside oncologist Dr. Monzon who is in contact with her sons. We will plan to try to optimize her performance, however if her mental status does not improve, we may plan to pivot to comfort care/hospice.  Leti is more somnolent and restless. She is oriented upon arousal. I explained to Brayan that everything we do at this point to try to optimize her is sort of a hail ronni. We can treat restlessness, but it works against her alertness/arousal that we want for her to continue to rehab/cancer treatment.   I personally feel that the family should bring her home with hospice services and I posed this question to him: 'if she has a prognosis of short weeks, how do you want her cared for?' He said he will consider this question with siblings.   Plan continues to be SHAR/SNF with oral anti-tumor agent from her outpatient oncologist.   2/28 Family is vigilant for any effect of the antitumor agent. Unfortunately her cognition seems to be declining or stably poor.  Dr. Monzon to speak to them this afternoon.  She meets inpatient hospice criteria.   3/3 very pleasant improvement today, still advising ongoing care planning with adult children. Will message her OP oncologist.      Palliative Care Follow Up: Palliative care team will Continue to  follow while inpatient    Richard Fox MD   3/3/2025 10:20 AM     3/4/2025  Hospitalist Progress Note    sleeping, was more awake earlier today   Pulm: decreased breath sounds R side, no increased work of breathing    Temp:  [97 °F (36.1 °C)-98.1 °F (36.7 °C)] 97.1 °F (36.2 °C)  Pulse:  [] 102  Resp:  [18] 18  BP: (122-132)/(74-96) 123/74  SpO2:  [95 %-97 %] 95 %    3/4/2025 0447      Gross per 24 hour   Intake --   Output 350 ml   Net -350 ml     Assessment/Plan:     Overall per family her mental status is improving and she seems more comfortable when at rest.  Less agitated.     -Now improving over the weekend.  Family would like to get her to rehab and continue her oral chemo agent.  They understand that she may continue to decline and be hospice appropriate in the near future.     - Already has 2L O2 at home - at baseline now   -Most recent chest x-ray with good placement of pleurx, however has not been draining as much, will get repeat chest x-ray today, continue to drain on schedule    DVT Prophy: SCD, eliquis   Lines: port     Dispo: pending course; family discussing hospice pending response with Garrett Schaefer MD   3/4/2025  3:59 PM     3/5/2025  Hospitalist Progress Note   Awake for me this morning. Pleasant, tends to let family answer all the questions.     Temp:  [96.8 °F (36 °C)-97.5 °F (36.4 °C)] 96.8 °F (36 °C)  Pulse:  [] 100  Resp:  [18] 18  BP: (108-134)/(69-91) 134/91  SpO2:  [94 %-95 %] 94 %    3/5/2025 1034      Gross per 24 hour   Intake 100 ml   Output 200 ml   Net -100 ml       Lab 03/01/25  0432 03/03/25  0849 03/04/25  0644   RBC 3.00* 3.00* 3.34*   HGB 10.2* 10.6* 11.7*   HCT 28.9* 28.9* 32.5*   MCV 96.3 96.3 97.3   MCH 34.0 35.3* 35.0*   MCHC 35.3 36.7 36.0   RDW 12.6 12.9 13.0   NEPRELIM 3.91  --   --    WBC 4.7 5.5 6.1   .0 131.0* 115.0*      Lab 03/03/25  0849 03/04/25  0644 03/05/25  0632   * 134* 130*   BUN 21 21 22   CREATSERUM 0.52* 0.56  0.53*   EGFRCR 97 95 96   CA 8.0* 8.2* 8.2*    144 143   K 3.8 3.9  3.9 3.9    112 111   CO2 21.0 20.0* 21.0     XR CHEST AP PORTABLE  (CPT=71045)  Result Date: 3/4/2025  CONCLUSION:         1. Right-sided PleurX catheter in place.  Stable loculated right pleural effusion and poor aeration of the right lung with stable ground-glass attenuation opacity in right lung which may be related to interstitial spread of tumor, edema, or pneumonia. 2. Left lung remains clear. 3. Right-sided Port-A-Cath with tip in superior right atrium unchanged.     PLAN   -Discussed with her oncologist at [facility], will transition to oral Decadron here.  Then plan for 4-week taper at discharge.        Amelia Schaefer MD   3/5/2025  4:00 PM     MEDICATIONS ADMINISTERED IN LAST 1 DAY:  apixaban (Eliquis) tab 5 mg       Date Action Dose Route User    3/5/2025 2147 Given 5 mg Oral Kala Martínez RN    3/5/2025 1015 Given 5 mg Oral Bárbara Jones RN          dexamethasone (Decadron) tab 4 mg       Date Action Dose Route User    3/6/2025 0534 Given 4 mg Oral Kala Martínez RN    3/5/2025 2147 Given 4 mg Oral Kala Martínez RN    3/5/2025 1015 Given 4 mg Oral Bárbara Jones RN          famotidine (Pepcid) tab 20 mg       Date Action Dose Route User    3/5/2025 1015 Given 20 mg Oral Bárbara Jones RN          levETIRAcetam (Keppra) 500 mg/5mL injection 500 mg       Date Action Dose Route User    3/5/2025 2147 Given 500 mg Intravenous Kala Martínez RN    3/5/2025 1014 Given 500 mg Intravenous Bárbara Jones RN          levothyroxine (Synthroid) tab 50 mcg       Date Action Dose Route User    3/5/2025 1014 Given 50 mcg Oral Bárbara Jones RN          Osimertinib Mesylate (Tagrisso) TABS 80 mg - PATIENTS OWN MEDICATION       Date Action Dose Route User    3/5/2025 1015 Given 80 mg Oral Bárbara Jones RN     Vitals (last day)       Date/Time Temp Pulse Resp BP SpO2 Weight O2 Device O2 Flow Rate (L/min) [facility]    03/06/25 0532 97.6 °F (36.4 °C)  92 -- 113/74 95 % -- None (Room air) -- AJ    03/05/25 2024 97.4 °F (36.3 °C) 93 18 123/72 92 % -- None (Room air) -- MM    03/05/25 1024 96.8 °F (36 °C) 100 18 134/91 94 % -- None (Room air) -- MK    03/05/25 0635 97.5 °F (36.4 °C) 69 18 108/69 95 % -- None (Room air) -- AS     03/04/25 2004 97.3 °F (36.3 °C) 86 18 124/81 94 % -- None (Room air) -- MW   03/04/25 1109 97.1 °F (36.2 °C) 102 18 123/74 95 % -- None (Room air) -- MJ   03/04/25 0446 -- -- -- -- -- 134 lb (60.8 kg) -- -- DB   03/04/25 0431 97 °F (36.1 °C) 73 18 122/82 96 % -- None (Room air) -- DB   03/03/25 1955 98.1 °F (36.7 °C) 99 18 132/96 Abnormal  97 % -- None (Room air) -- DB   03/03/25 1314 -- 103 -- -- -- -- -- -- TL   03/03/25 1200 97.1 °F (36.2 °C) 85 16 119/74 96 % -- None (Room air) -- KW   03/03/25 0434 98 °F (36.7 °C) 72 19 130/78 96 % -- None (Room air) -- VT   03/02/25 1944 -- 90 24 123/82 95 % -- None (Room air) -- BS   03/02/25 1052 96.9 °F (36.1 °C) 73 26 133/86 97 % -- None (Room air) -- DA   03/02/25 0546 -- 79 17 143/89 95 % -- None (Room air) -- MG   03/01/25 2139 -- 77 17 156/96 Abnormal  96 % -- None (Room air) -- VT   03/01/25 1122 97.5 °F (36.4 °C) 99 16 124/80 95 % -- None (Room air) -- BS   03/01/25 0516 98.6 °F (37 °C) 92 18 140/94 Abnormal  94 % -- None (Room air) -- MM       FOR CONTINUED STAY REVIEW/APPROVAL OF INPT ADMISSION

## 2025-03-06 NOTE — PLAN OF CARE
No acute changes overnight. VSS. On eliquis. Purewick in place. Prefers meds crushed w/ pudding. Pleurx to L chest. L sided weakness. Daughter at bedside. Call light in reach.   Problem: Patient Centered Care  Goal: Patient preferences are identified and integrated in the patient's plan of care  Description: Interventions:  - What would you like us to know as we care for you? \"My family helps take care of me.\"  - Provide timely, complete, and accurate information to patient/family  - Incorporate patient and family knowledge, values, beliefs, and cultural backgrounds into the planning and delivery of care  - Encourage patient/family to participate in care and decision-making at the level they choose  - Honor patient and family perspectives and choices  Outcome: Progressing

## 2025-03-06 NOTE — CONSULTS
[facility]  part of [facility]    Report of Consultation    Damari Hong Patient Status:  Inpatient    1950 MRN [id number]   Location [facility] 4W/SW/SE Attending Amelia Schaefer MD   Hosp Day # 14 PCP Shruthi Simon MD     Reason for Consultation-    Pleurx catheter evaluation.     History of Present Illness:  Damari Hong is a a(n) 74 year old female with lung CA and brain mets.      History:  Past Medical History:    Depression    off Cymbalta    Esophageal reflux    HYPERLIPIDEMIA    Lung cancer (HCC)    with brain mets    OSTEOARTHRITIS    OTHER DISEASES    Hashimoto's--sees Dr. Palmer--Endo    OTHER DISEASES    DEXA-WNL     Reflux    Thyroid disease     Past Surgical History:   Procedure Laterality Date    Appendectomy      Colonoscopy      -Dr. Rodriges--colonoscopy-normal--next     Hysterectomy      partial hysterectomy-still with bilateral ovaries--due to proloapsed uterus--PAP's always normal    Knee replacement surgery      L-knee TKP-Dr. Carney '    Laparoscopic  2014    Procedure: LAPAROSCOPIC CHOLECYSTECTOMY WITH  CHOLANGIOGRAM   POSS OPEN;  Surgeon: Monik Simon MD;  Location: [facility]    Other surgical history      Arthroscopy    Other surgical history      both knee replacement    Pleurx catheter       Family History   Problem Relation Age of Onset    Cancer Father         EtOH, Liver Dz    Cancer Mother         colon cancer and Alzheimer;s-diagnosed 68      reports that she quit smoking about 35 years ago. Her smoking use included cigarettes. She started smoking about 61 years ago. She has a 65 pack-year smoking history. She has quit using smokeless tobacco. She reports that she does not currently use alcohol. She reports that she does not use drugs.    Allergies:  Allergies[1]    Medications:    Current Facility-Administered Medications:     dexamethasone (Decadron) tab 4 mg, 4 mg, Oral, Q8H ROSIE    loperamide  (Imodium) cap 4 mg, 4 mg, Oral, QID PRN    guaiFENesin (Robitussin) 100 MG/5 ML oral liquid 100 mg, 100 mg, Oral, Q4H PRN    Osimertinib Mesylate (Tagrisso) TABS 80 mg - PATIENTS OWN MEDICATION, 80 mg, Oral, Daily    apixaban (Eliquis) tab 5 mg, 5 mg, Oral, BID    acetaminophen (Tylenol) tab 650 mg, 650 mg, Oral, Q4H PRN **OR** acetaminophen (Tylenol) rectal suppository 650 mg, 650 mg, Rectal, Q4H PRN    labetalol (Trandate) 5 mg/mL injection 10 mg, 10 mg, Intravenous, Q10 Min PRN    hydrALAzine (Apresoline) 20 mg/mL injection 10 mg, 10 mg, Intravenous, Q2H PRN    famotidine (Pepcid) tab 20 mg, 20 mg, Oral, Daily **OR** famotidine (Pepcid) 20 mg/2mL injection 20 mg, 20 mg, Intravenous, Daily    ondansetron (Zofran) 4 MG/2ML injection 4 mg, 4 mg, Intravenous, Q6H PRN **OR** metoclopramide (Reglan) 5 mg/mL injection 10 mg, 10 mg, Intravenous, Q8H PRN    albuterol (Ventolin HFA) 108 (90 Base) MCG/ACT inhaler 1 puff, 1 puff, Inhalation, Q6H PRN    levothyroxine (Synthroid) tab 50 mcg, 50 mcg, Oral, Daily    levETIRAcetam (Keppra) 500 mg/5mL injection 500 mg, 500 mg, Intravenous, Q12H    Review of Systems:  Pertinent items are noted in HPI.      Physical Exam:   General: Alert,  cooperative.  No apparent distress.  Vital Signs:  Blood pressure 114/82, pulse 102, temperature 96.5 °F (35.8 °C), temperature source Axillary, resp. rate 18, height 63\", weight 134 lb (60.8 kg), SpO2 90%, not currently breastfeeding.  HEENT: Exam is unremarkable.  Neck: Supple.  Lungs: Normal respiratory effort      Imaging:  XR CHEST AP PORTABLE  (CPT=71045)    Result Date: 3/4/2025  CONCLUSION:  1. Right-sided PleurX catheter in place.  Stable loculated right pleural effusion and poor aeration of the right lung with stable ground-glass attenuation opacity in right lung which may be related to interstitial spread of tumor, edema, or pneumonia. 2. Left lung remains clear. 3. Right-sided Port-A-Cath with tip in superior right atrium unchanged.      Dictated by (CST): Nasir Jamil MD on 3/04/2025 at 6:29 PM     Finalized by (CST): Nasir Jamil MD on 3/04/2025 at 6:32 PM            Impression:  Patient Active Problem List   Diagnosis    Hypercholesteremia    Vitamin D deficiency    GERD (gastroesophageal reflux disease)    Hashimoto's thyroiditis    Delirium, acute    Dehydration    Primary malignant neoplasm of lung metastatic to other site, unspecified laterality (HCC)    Intracranial hemorrhage (HCC)    Acute hemorrhagic infarction of brain (HCC)    Palliative care by specialist       Assessment/Plan:  Pleurx catheter - family concern that there has been less output from chest Pleurx.  CXR reviewed - catheter is in proper position.  Effusion is loculated.  No need to manipulate drain.  Reassured family.     Thank you for allowing me to participate in the care of your patient.    OSCAR VICTOR HINATERRY, APRN  3/6/2025  12:53 PM       [1] No Known Allergies

## 2025-03-06 NOTE — OCCUPATIONAL THERAPY NOTE
OCCUPATIONAL THERAPY TREATMENT NOTE - INPATIENT        Room Number: 462/462-A     Presenting Problem: acute delirium    Problem List  Principal Problem:    Delirium, acute  Active Problems:    Dehydration    Primary malignant neoplasm of lung metastatic to other site, unspecified laterality (HCC)    Intracranial hemorrhage (HCC)    Acute hemorrhagic infarction of brain (HCC)    Palliative care by specialist      OCCUPATIONAL THERAPY ASSESSMENT   Patient demonstrates fair progress this session, goals remain in progress.    Patient is requiring moderate assist, maximum assist, and dependent as a result of the following impairments: decreased functional strength, decreased functional reach, decreased endurance, impaired   balance, impaired coordination, impaired motor planning, decreased muscular endurance, cognitive deficits (slow processing, impaired alertness), medical status, limited   ROM, decreased compliance/participation, and decreased safety awareness.    Patient continues to function below baseline with ADLs/mobility.  Next session anticipate patient to progress grooming, eating, bed mobility, transfers, static sitting balance, and dynamic sitting balance.  Occupational Therapy will continue to follow patient for duration of hospitalization.    Patient continues to benefit from continued skilled OT services: to promote return to prior level of function and safety with continuous assistance and gradual rehabilitative therapy.     PLAN DURING HOSPITALIZATION     OT Treatment Plan: Balance activities, Energy conservation/work simplification techniques, ADL training, Functional transfer training, UE strengthening/ROM, Endurance training, Cognitive reorientation, Patient/Family education, Patient/Family training, Equipment eval/education, Fine motor coordination activities, Compensatory technique education, Neuromuscluar reeducation     SUBJECTIVE  \"Ok\"- agreeable to sit in the chair    OBJECTIVE  Precautions:  Limb alert - left, Seizure, Bed/chair alarm    PAIN ASSESSMENT  Rating: -- (denied pain during session)  Location: LT UE/IV site (son reports pt is having pain d/t K+ infusion)  Management Techniques: Repositioning        ACTIVITIES OF DAILY LIVING ASSESSMENT  AM-PAC ‘6-Clicks’ Inpatient Daily Activity Short Form  How much help from another person does the patient currently need…  -   Putting on and taking off regular lower body clothing?: Total  -   Bathing (including washing, rinsing, drying)?: A Lot  -   Toileting, which includes using toilet, bedpan or urinal? : Total  -   Putting on and taking off regular upper body clothing?: A Lot  -   Taking care of personal grooming such as brushing teeth?: A Lot  -   Eating meals?: A Lot    AM-PAC Score:  Score: 10  Approx Degree of Impairment: 74.7%  Standardized Score (AM-PAC Scale): 27.31  CMS Modifier (G-Code): CL    FUNCTIONAL TRANSFER ASSESSMENT  Sit to Stand: Edge of Bed  Edge of Bed: Maximum Assist (Ax2) SPT bed>chair, cues for techniques and UE/LE placement    BED MOBILITY  Rolling: Maximum Assist  Supine to Sit : Maximum Assist     BALANCE ASSESSMENT  Static Sitting: Moderate Assist  Static Standing: Not Tested    FUNCTIONAL ADL ASSESSMENT  Grooming Seated: Moderate Assist (encouraged to use LUE to enhance strength needed for grooming tasks); able to touch chin with LUE in prep for grooming tasks with LUE, however fatigues quickly  LB Dressing Seated: Dependent (socks, able to lift LE off of the bed briefly with verbal cues to assist, OT required to perform task with total A)  Toileting Seated: Dependent    THERAPEUTIC EXERCISE: sitting EOB neck AROM x5 reps and cues required to keep upright with weakness while unsupported, AAROM/AROM L hand, elbow x5 reps (continued to present with weakness and unable to achieve full ROM demonstrating 3-/5 distally LUE, 2+/5 L shoulder. Pt fatigues quickly requiring frequent rest breaks. Family educated on how to assist patient  with repositioning in the chair as pt displayed L side lean as well as encouraging pt to extend head and neck to neutral to improve strength and overall posture in prep for seated ADL tasks. Encouraged pt and family to use LUE during ADL tasks to improve strength needed for occupational performance.     Skilled Therapy Provided: RN approved session. Coordinated with PT as pt required Ax2 for safety with functional mobility portion of session. Received patient in bed. Able to follow 80% of commands with increased time required for processing. Able to verbalize short 1 word answers to questions with increased time. States that it's March and when provided multiple choice options, able to state the correct year. Performed ADLs/functional mobility/transfers as stated above.  At the end of session, patient left in chair with needs met, alarm on and RN aware of session. Pt appeared to have slight improvement with cooperation/engagement to perform therapeutic activities today. May benefit from rehab to improve strength/activity tolerance and minimize caregiver burden prior to dc home.    EDUCATION PROVIDED  Patient Education : Role of Occupational Therapy; Plan of Care; Discharge Recommendations; Functional Transfer Techniques; Fall Prevention; Posture/Positioning; Energy Conservation; Other (ADL training, LUE HEP)  Patient's Response to Education: Verbalized Understanding; Returned Demonstration  Family/Caregiver Education : Fall Prevention; Functional Transfer Techniques; Energy Conservation; Other; Posture/Positioning (ADL training, LUE HEP, head/neck posture/positioning to improve posture/strength and ROM, encouraging patient to perform ADLs actively)  Family/Caregiver's Response to Education: Verbalized Understanding; Returned Demonstration    The patient's Approx Degree of Impairment: 74.7% has been calculated based on documentation in the [facility] '6 clicks' Inpatient Daily Activity Short Form.  Research supports that  patients with this level of impairment may benefit from rehab.  Final disposition will be made by interdisciplinary medical team.    Patient End of Session: Up in chair, Needs met, Call light within reach, RN aware of session/findings, All patient questions and concerns addressed, Alarm set, Family present    OT Goals:     Patient will complete functional transfer with min A  Comment: ongoing    Patient will complete UE dressing with Min A  Comment: ongoing    Patient will tolerate standing for 2 minutes in prep for adls with Min A   Comment: ongoing    Patient will complete LE dressing with Min A  Comment:ongoing             OT Session Time: 20 minutes  Self-Care Home Management: 5 minutes  Therapeutic Activity: 10 minutes  Therapeutic Exercise: 5 minutes    Ailyn Salmeron OTR/L

## 2025-03-06 NOTE — PLAN OF CARE
Leti is alert and oriented x 4, she is lethargic at times but increasingly more alert, still talks at a whisper. Patient up to the chair with PT. Patient tolerating more of meals. Patient still on a general diet with honey thick liquids. Family has made a choice for [facility]. Call light within reach, family at bedside.  Problem: Patient Centered Care  Goal: Patient preferences are identified and integrated in the patient's plan of care  Description: Interventions:  - What would you like us to know as we care for you? \"My family helps take care of me.\"  - Provide timely, complete, and accurate information to patient/family  - Incorporate patient and family knowledge, values, beliefs, and cultural backgrounds into the planning and delivery of care  - Encourage patient/family to participate in care and decision-making at the level they choose  - Honor patient and family perspectives and choices  Outcome: Progressing     Problem: Patient/Family Goals  Goal: Patient/Family Long Term Goal  Description: Patient's Long Term Goal: Feel better    Interventions:  - Educate on taking medications as prescribed  - Monitor vitals  -Up as tolerated  - See additional Care Plan goals for specific interventions  Outcome: Progressing  Goal: Patient/Family Short Term Goal  Description: Patient's Short Term Goal: No syncopal episodes    Interventions:   - EKG  - EEG  - Neurology consult  - Monitor vitals  - MRI  - See additional Care Plan goals for specific interventions  Outcome: Progressing     Problem: NEUROLOGICAL - ADULT  Goal: Achieves stable or improved neurological status  Description: INTERVENTIONS  - Assess for and report changes in neurological status  - Initiate measures to prevent increased intracranial pressure  - Maintain blood pressure and fluid volume within ordered parameters to optimize cerebral perfusion and minimize risk of hemorrhage  - Monitor temperature, glucose, and sodium. Initiate appropriate interventions as  ordered  Outcome: Progressing  Goal: Absence of seizures  Description: INTERVENTIONS  - Monitor for seizure activity  - Administer anti-seizure medications as ordered  - Monitor neurological status  Outcome: Progressing  Goal: Remains free of injury related to seizure activity  Description: INTERVENTIONS:  - Maintain airway, patient safety  and administer oxygen as ordered  - Monitor patient for seizure activity, document and report duration and description of seizure to MD/LIP  - If seizure occurs, turn patient to side and suction secretions as needed  - Reorient patient post seizure  - Seizure pads on all 4 side rails  - Instruct patient/family to notify RN of any seizure activity  - Instruct patient/family to call for assistance with activity based on assessment  Outcome: Progressing  Goal: Achieves maximal functionality and self care  Description: INTERVENTIONS  - Monitor swallowing and airway patency with patient fatigue and changes in neurological status  - Encourage and assist patient to increase activity and self care with guidance from PT/OT  - Encourage visually impaired, hearing impaired and aphasic patients to use assistive/communication devices  Outcome: Progressing     Problem: Impaired Cognition  Goal: Patient will exhibit improved attention, thought processing and/or memory  Description: Interventions:    Outcome: Progressing     Problem: PAIN - ADULT  Goal: Verbalizes/displays adequate comfort level or patient's stated pain goal  Description: INTERVENTIONS:  - Encourage pt to monitor pain and request assistance  - Assess pain using appropriate pain scale  - Administer analgesics based on type and severity of pain and evaluate response  - Implement non-pharmacological measures as appropriate and evaluate response  - Consider cultural and social influences on pain and pain management  - Manage/alleviate anxiety  - Utilize distraction and/or relaxation techniques  - Monitor for opioid side effects  -  Notify MD/LIP if interventions unsuccessful or patient reports new pain  - Anticipate increased pain with activity and pre-medicate as appropriate  Outcome: Progressing     Problem: SAFETY ADULT - FALL  Goal: Free from fall injury  Description: INTERVENTIONS:  - Assess pt frequently for physical needs  - Identify cognitive and physical deficits and behaviors that affect risk of falls.  - Aynor fall precautions as indicated by assessment.  - Educate pt/family on patient safety including physical limitations  - Instruct pt to call for assistance with activity based on assessment  - Modify environment to reduce risk of injury  - Provide assistive devices as appropriate  - Consider OT/PT consult to assist with strengthening/mobility  - Encourage toileting schedule  Outcome: Progressing     Problem: DISCHARGE PLANNING  Goal: Discharge to home or other facility with appropriate resources  Description: INTERVENTIONS:  - Identify barriers to discharge w/pt and caregiver  - Include patient/family/discharge partner in discharge planning  - Arrange for needed discharge resources and transportation as appropriate  - Identify discharge learning needs (meds, wound care, etc)  - Arrange for interpreters to assist at discharge as needed  - Consider post-discharge preferences of patient/family/discharge partner  - Complete POLST form as appropriate  - Assess patient's ability to be responsible for managing their own health  - Refer to Case Management Department for coordinating discharge planning if the patient needs post-hospital services based on physician/LIP order or complex needs related to functional status, cognitive ability or social support system  Outcome: Progressing

## 2025-03-06 NOTE — PHYSICAL THERAPY NOTE
PHYSICAL THERAPY TREATMENT NOTE - INPATIENT     Room Number: 462/462-A       Presenting Problem: delerium, malginant lung ca with brain mets  Co-Morbidities : lung CA with brain mets    Problem List  Principal Problem:    Delirium, acute  Active Problems:    Dehydration    Primary malignant neoplasm of lung metastatic to other site, unspecified laterality (HCC)    Intracranial hemorrhage (HCC)    Acute hemorrhagic infarction of brain (HCC)    Palliative care by specialist      PHYSICAL THERAPY ASSESSMENT   Patient demonstrates fair progress this session, goals  updated to reflect patient performance.      Patient is requiring maximum assist as a result of the following impairments: decreased functional strength, decreased endurance/aerobic capacity, impaired sitting / standing balance, impaired coordination, impaired motor planning, decreased muscular endurance, cognitive deficits (A/O x 1 limited voice projection but answering questions with single word responses with slurred speech), and medical status.     Patient continues to function below baseline with bed mobility, transfers, maintaining seated position, standing prolonged periods, performing household tasks, and wheelchair mobility.  Next session anticipate patient to progress bed mobility, transfers, maintaining seated position, standing prolonged periods, performing household tasks, and wheelchair mobility.  Physical Therapy will continue to follow patient for duration of hospitalization.    Patient continues to benefit from continued skilled PT services: to promote return to prior level of function and safety with continuous assistance and gradual rehabilitative therapy .    PLAN DURING HOSPITALIZATION  Nursing Mobility Recommendation : Lift Equipment  PT Device Recommendation: Rolling walker, Mechanical lift  PT Treatment Plan: Bed mobility, Body mechanics, Endurance, Energy conservation, Patient education, Neuromuscular re-educate, Range of motion,  Strengthening, Transfer training, Balance training  Frequency (Obs): 3-5x/week     SUBJECTIVE  OK (with a thumbs up) pt using verbal and non verbal communication today    OBJECTIVE  Precautions: Limb alert - left, Seizure, Bed/chair alarm    WEIGHT BEARING RESTRICTION       PAIN ASSESSMENT   Rating: Unable to rate  Location: denied pain  Management Techniques: Activity promotion, Repositioning    BALANCE  Static Sitting: Poor +  Dynamic Sitting: Poor  Static Standing: Dependent  Dynamic Standing: Dependent      AM-PAC '6-Clicks' INPATIENT SHORT FORM - BASIC MOBILITY  How much difficulty does the patient currently have...  Patient Difficulty: Turning over in bed (including adjusting bedclothes, sheets and blankets)?: A Lot   Patient Difficulty: Sitting down on and standing up from a chair with arms (e.g., wheelchair, bedside commode, etc.): Unable   Patient Difficulty: Moving from lying on back to sitting on the side of the bed?: A Lot   How much help from another person does the patient currently need...   Help from Another: Moving to and from a bed to a chair (including a wheelchair)?: Total   Help from Another: Need to walk in hospital room?: Total   Help from Another: Climbing 3-5 steps with a railing?: Total     AM-PAC Score:  Raw Score: 8   Approx Degree of Impairment: 86.62%   Standardized Score (AM-PAC Scale): 28.58   CMS Modifier (G-Code): CM    FUNCTIONAL ABILITY STATUS  Functional Mobility/Gait Assessment  Gait Assistance: Maximum assistance (SPT to chair)  Distance (ft): SPT to chair  Rolling: maximum assist  Supine to Sit: maximum assist  Sit to Supine: maximum assist  Sit to Stand: dependent    Skilled Therapy Provided: Pt and family ed with bed mobility and therex at EOB with AAROM of all 4 extremities. L sided weakness presents with L hand grasp 3-/5 L elbow 3-/5 L shoulder 2+ / 5 LUE fatigues quickly with AROM. Pt able to touch chin with L hand in sitting with maximal effort. RUE 3/5. Pt ed with  multi plane sitting challenges rocking ant post and laterally. VC's and tactile cues for upright head and midline sitting postures. Pt able to follow verbal cues.     Pt appears more engaged and alert today with improved functional effort to participate. Pt and family ed with max A SPT to a chair with pt positioning in midline sitting position to encourage upright midline sitting postures.     Pt will benefit from GR with PT, OT, SLP to regain her maximal sitting balance and functional mobility to tolerate w/c sitting to improve quality of life and socialization with others and to reduce burden of care.     The patient's Approx Degree of Impairment: 86.62% has been calculated based on documentation in the [facility] '6 clicks' Inpatient Daily Activity Short Form.  Research supports that patients with this level of impairment may benefit from GR with PT, OT, SLP.  Final disposition will be made by interdisciplinary medical team.    THERAPEUTIC EXERCISES  Lower Extremity Ankle pumps  Heel slides  LAQ     Position Sitting & Standing       Patient End of Session: Up in chair, With  staff, Needs met, Call light within reach, Bracing education provided per handout, All patient questions and concerns addressed, Alarm set, Family present    CURRENT GOALS   Goals to be met by: 3/7/25  Patient Goal Patient's self-stated goal is: not stated   Goal #1 Patient is able to demonstrate supine - sit EOB @ level: minimum assistance      Goal #1   Current Status  Mod A x2   Goal #2 Patient is able to demonstrate transfers Sit to/from Stand at assistance level: minimum assistance with walker - rolling      Goal #2  Current Status  Max A x 1 bed>chair stand pivot   Goal #3 Patient is able to ambulate 10 feet with assist device: walker - rolling at assistance level: minimum assistance   Goal #3   Current Status  NT   Goal #4 Patient to demonstrate independence with home activity/exercise instructions provided to patient in preparation for  discharge.   Goal #4   Current Status  in progress       Therapeutic Activity: 15 minutes

## 2025-03-06 NOTE — SLP NOTE
SPEECH DAILY NOTE - INPATIENT    ASSESSMENT & PLAN   ASSESSMENT      Proper PPE worn. Hands sanitized upon entrance/exit Pt room.         Pt alert, afebrile and remains on room air. Pt with continued overall weakness and deconditioning. Pt seen for ongoing swallow treatment (after consulting with RN). Dtr present. Pt agreed to participate. Pt's preferred method of learning verbal with demonstration. Pt placed upright in bed; observed with current diet of bite size/moderately-thick liquids for monitoring diet tolerance. Additionally, Pt seen with mildly-thick liquids via tsp trials for candidacy of diet upgrade. Swallowing precautions/strategies discussed with Pt and dtr as SLP directed oral trials. Slow mastication and bolus formation on bite size food d/t overall fatigue. No significant oral retention.  Pharyngeal response appeared delayed per hyolaryngeal elevation to completion (functional rise/strength per palpation). No overt CSA on bite size nor moderately-thick liquids via tsp. Coughing S/P 2/4 trials mildly-thick liquids via tsp. No diet upgrade. Family continues to desire oral diet with upgrade if possible. Collaborated with RN regarding Pt's swallowing plan of care. Per RN, Pt with tolerance of current diet. No report of difficulty taking pills crushed in pureed. Sp02 ~95% during this session. Call light within Pt's reach upon SLP discharge from room.        CXR 3/04/25:  CONCLUSION:   1. Right-sided PleurX catheter in place.  Stable loculated right pleural effusion and poor aeration of the right lung with stable ground-glass attenuation opacity in right lung which may be related to interstitial spread of tumor, edema, or pneumonia.   2. Left lung remains clear.   3. Right-sided Port-A-Cath with tip in superior right atrium unchanged.             PLAN:    To f/u in 2 days (pending JUANCARLOS) to ensure safe intake of diet and reinforce swallowing/aspiration precautions. To monitor for new CXR results. Diet  upgrade IF safely tolerated. Family education to be continued as available.             Diet Recommendations - Solids: Mechanical soft chopped/ Soft & Bite Sized (Family does not want solids decreased to puree)  Diet Recommendations - Liquids: Honey thick liquids/ Moderately thick (by teaspoon)    Compensatory Strategies Recommended: Liquids via spoon, Multiple swallows, Alternate consistencies (Feed onlyl when awake)  Aspiration Precautions: Upright position, Slow rate, Small bites, No straw, 1:1 feeding (1/2 teaspoon amounts)  Medication Administration Recommendations: Crushed in puree (1/2 teaspoon amounts)    Patient Experiencing Pain: No  Treatment Plan  Treatment Plan/Recommendations: Videofluoroscopic swallow study  Interdisciplinary Communication: Discussed with RN  Plan posted at bedside      GOALS  Goal #1 The patient will tolerate soft bite size consistency and teaspoon amounts of moderately thick liquids without overt signs or symptoms of aspiration with 100 % accuracy over '2-3 session(s).    No CSA on current diet. Slow bolus formation soft bite size consistency. Cough S/P mildly thick via tsp 2/4 trials.         In Progress   Goal #2 The patient/family/caregiver will demonstrate understanding and implementation of aspiration precautions and swallow strategies independently over 2 session(s).    Swallowing precautions posted in room. Precautions discussed with dtr; v/u.      In Progress   Goal #3 The patient will utilize compensatory strategies as outlined by  BSSE (clinical evaluation) including Slow rate, Small bites, Multiple swallows, Alternate liquids/solids, No straws, Upright 90 degrees, Liquids via tsp amount only, 1/2 tsp amounts, Feed patient, feed pt only when alert with mod assistance 95 % of the time across 2 sessions.    SLP fed Pt; strategies executed. Reinforced Pt to eat only when fully alert.     In Progress   Goal #4 The patient will tolerate trial upgrade of soft solids consistency  and mildly thick liquids without overt signs or symptoms of aspiration with 100 % accuracy over 2 session(s).    No diet upgrade at this time.  In Progress    FOLLOW UP  Follow Up Needed (Documentation Required): Yes  SLP Follow-up Date: 03/08/25  Duration: 1 week    Session: 4    If you have any questions, please contact   Rajni Ziegler M.S. [facility]SLP  Speech-Language Pathologist  [facility]  #[phone]

## 2025-03-07 RX ORDER — LEVETIRACETAM 500 MG/1
500 TABLET ORAL 2 TIMES DAILY
Status: DISCONTINUED | OUTPATIENT
Start: 2025-03-07 | End: 2025-03-09

## 2025-03-07 NOTE — PROGRESS NOTES
[facility] Hospitalist Progress Note     CC: Hospital Follow up    PCP: Shruthi Simon MD       Assessment/Plan:     Principal Problem:    Delirium, acute  Active Problems:    Dehydration    Primary malignant neoplasm of lung metastatic to other site, unspecified laterality (HCC)    Intracranial hemorrhage (HCC)    Acute hemorrhagic infarction of brain (HCC)    Palliative care by specialist      Ms. 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    Overall per family her mental status is improving and she seems more comfortable when at rest.  Due to medical history, railings needed and  used for seizure precaution and safety.  Patient is medically cleared for rehab once Auth obtained     L sided weakness  New right paramedian midbrain metastatic lesion  Syncope  - noted on CT head  - hold Eliquis, ok to restart Eliquis per per NSGY  - MRI brain without bleeding noted  - NSGY and neurology consulted  - continue keppra  - stop neurochecks  - PT/OT/SW  - per neurology, 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 Monzon from [facility] he is awaiting approval of an oral immunotherapy that may have some benefit, but not curative,  -palliative care eval  -trial of decadron, was agitated 2/27 palliative stopped provigil  -2/28 appears worse was agitated last night, sleepy this morning after IV pain meds  -continue to monitor, more confused and lethargic last week, seems to be improving with decadron  and Tagrisso  -Now improving over the weekend.  Family would like to get her to rehab and continue her oral chemo agent.  They understand that she may continue to decline and be hospice appropriate in the near future.  -Discussed with her oncologist at [facility], will transition to oral Decadron here.  Then plan for 4-week taper at discharge.      Lung Ca with recurrent malignant pleural effusion  PE hx   Small R PTX - POA   Chronic hypoxic respiratory failure on home O2  - S/p R sided PleurX placement 1/31/25  - drain scheduled MWF 9:30AM, up to 1L  - Already has 2L O2 at home - at baseline now   -Most recent chest x-ray with good placement of pleurx, however has not been draining as much, will get repeat chest x-ray today, continue to drain on schedule    Lung cancer with brain mets  - follow-up oncology as outpatient  - planned to start chemotherapy this week    - per family, had recent MRI brain a few weeks ago that showed decrease in size of brain mets  - has been on prednisone taper since November, currently on 10 mg daily-> now on decadron  - tegresso was ordered, Dr. Monzon from  wants her to start this, medication has been approved will start 2/27 per Dr. Monzon with oncology -> spit out medication on 2/27, was able to take 2/28 and 3/1 in pudding  -Will message her [facility] oncologist for Decadron taper    DVT/PE  - restart Eliquis   - 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    Hypothyroidism  - Continue synthroid     ACP  - CODE- DNR/select- updated today with POA  - POA- son Brayan updated at bedside   - lives at home with son     FN:  - IVF: stopped IVF  - Diet: regular     DVT Prophy: SCD, eliquis   Lines: port     Dispo: pending course; plan for [facility]      Dw family at bedside      Further recommendations pending patient's clinical course.  [facility] hospitalist to continue to follow patient while in house     Patient and/or patient's family given opportunity to ask questions and note  understanding and agreeing with therapeutic plan as outlined    Note: This chart was prepared using voice recognition software and may contain unintended word substitution errors.     Medically ok for dc to rehab once auth approved     Amelia Schaefer MD  Hospitalist  [facility]   Answering service: [phone]       Subjective:     Appears comfortable today.  They did discuss drain with IR, will continue current management.    OBJECTIVE:    Blood pressure 118/75, pulse 91, temperature 97.5 °F (36.4 °C), temperature source Oral, resp. rate 16, height 5' 3\" (1.6 m), weight 134 lb (60.8 kg), SpO2 94%, not currently breastfeeding.    Temp:  [97.5 °F (36.4 °C)-97.7 °F (36.5 °C)] 97.5 °F (36.4 °C)  Pulse:  [] 91  Resp:  [16] 16  BP: (102-118)/(68-75) 118/75  SpO2:  [93 %-94 %] 94 %      Intake/Output:    Intake/Output Summary (Last 24 hours) at 3/7/2025 1524  Last data filed at 3/7/2025 1012  Gross per 24 hour   Intake 120 ml   Output 300 ml   Net -180 ml       Last 3 Weights   03/04/25 0446 134 lb (60.8 kg)   02/25/25 0507 128 lb (58.1 kg)   02/21/25 1200 126 lb 8.7 oz (57.4 kg)   02/20/25 2050 128 lb 1.4 oz (58.1 kg)   02/20/25 1146 162 lb 0.6 oz (73.5 kg)   03/23/22 0942 162 lb (73.5 kg)   01/12/22 1248 160 lb (72.6 kg)       Exam  GEN: awake, cooperative   HEENT: EOMI  Pulm: decreased breath sounds R side, no increased work of breathing  CV: RRR, no murmurs  ABD: Soft, non-tender, non-distended, +BS  Neuro: LUE/LLE weakness, follows commands      Data Review:       Labs:     Recent Labs   Lab 03/01/25  0432 03/03/25  0849 03/04/25  0644   RBC 3.00* 3.00* 3.34*   HGB 10.2* 10.6* 11.7*   HCT 28.9* 28.9* 32.5*   MCV 96.3 96.3 97.3   MCH 34.0 35.3* 35.0*   MCHC 35.3 36.7 36.0   RDW 12.6 12.9 13.0   NEPRELIM 3.91  --   --    WBC 4.7 5.5 6.1   .0 131.0* 115.0*         Recent Labs   Lab 03/03/25  0849 03/04/25  0644 03/05/25  0632   * 134* 130*   BUN 21 21 22   CREATSERUM 0.52* 0.56 0.53*    EGFRCR 97 95 96   CA 8.0* 8.2* 8.2*    144 143   K 3.8 3.9  3.9 3.9    112 111   CO2 21.0 20.0* 21.0       No results for input(s): \"ALT\", \"AST\", \"ALB\", \"AMYLASE\", \"LIPASE\", \"LDH\" in the last 168 hours.    Invalid input(s): \"ALPHOS\", \"TBIL\", \"DBIL\", \"TPROT\"      Imaging:  No results found.      Meds:     INPATIENT MEDICATIONS    Scheduled Medications:      levETIRAcetam, 500 mg, BID  dexamethasone, 4 mg, Q8H ROSIE  Osimertinib Mesylate, 80 mg, Daily  apixaban, 5 mg, BID  famotidine, 20 mg, Daily   Or  famotidine, 20 mg, Daily  levothyroxine, 50 mcg, Daily            Drips:           PRN Medications  loperamide, 4 mg, QID PRN  guaiFENesin, 100 mg, Q4H PRN  acetaminophen, 650 mg, Q4H PRN   Or  acetaminophen, 650 mg, Q4H PRN  labetalol, 10 mg, Q10 Min PRN  hydrALAzine, 10 mg, Q2H PRN  ondansetron, 4 mg, Q6H PRN   Or  metoclopramide, 10 mg, Q8H PRN  albuterol, 1 puff, Q6H PRN

## 2025-03-07 NOTE — CM/SW NOTE
GEORGINA requested SANDOVAL Talbot submit [facility] insurance authorization via [employer] portal.    Per SANDOVAL Talbot, SORAIDA [id number].    1525 CM placed [facility] on will call through Sunday, 3/9 in the event of weekend discharge. PCS form started - date to be entered on day of discharge.    / to remain available for support and/or discharge planning.     Plan: [facility] [employer] - pending insurance authorization, medical clearance    Rajni Cruz RN, BSN  Nurse   [phone]

## 2025-03-07 NOTE — CM/SW NOTE
Prior Authorization - Destination  Destination Type: Skilled nursing facility  Service Provider: [facility]  Payer Communication Destination Comments: SORAIDA [id number]  Prior Authorization Status: Submitted/Pending  Prior Authorization Start Date: 02/26/25     Dahiana West DSC

## 2025-03-07 NOTE — PLAN OF CARE
Problem: NEUROLOGICAL - ADULT  Goal: Achieves stable or improved neurological status  Description: INTERVENTIONS  - Assess for and report changes in neurological status  - Initiate measures to prevent increased intracranial pressure  - Maintain blood pressure and fluid volume within ordered parameters to optimize cerebral perfusion and minimize risk of hemorrhage  - Monitor temperature, glucose, and sodium. Initiate appropriate interventions as ordered  Outcome: Progressing     Problem: PAIN - ADULT  Goal: Verbalizes/displays adequate comfort level or patient's stated pain goal  Description: INTERVENTIONS:  - Encourage pt to monitor pain and request assistance  - Assess pain using appropriate pain scale  - Administer analgesics based on type and severity of pain and evaluate response  - Implement non-pharmacological measures as appropriate and evaluate response  - Consider cultural and social influences on pain and pain management  - Manage/alleviate anxiety  - Utilize distraction and/or relaxation techniques  - Monitor for opioid side effects  - Notify MD/LIP if interventions unsuccessful or patient reports new pain  - Anticipate increased pain with activity and pre-medicate as appropriate  3/7/2025 0327 by Richard Tate, RN  Outcome: Progressing  3/7/2025 0326 by Richard Tate, RN  Outcome: Progressing

## 2025-03-08 RX ORDER — BENZONATATE 100 MG/1
100 CAPSULE ORAL 3 TIMES DAILY PRN
Status: DISCONTINUED | OUTPATIENT
Start: 2025-03-08 | End: 2025-03-09

## 2025-03-08 NOTE — PROGRESS NOTES
[facility] Hospitalist Progress Note     CC: Hospital Follow up    PCP: Shruthi Simon MD       Assessment/Plan:     Principal Problem:    Delirium, acute  Active Problems:    Dehydration    Primary malignant neoplasm of lung metastatic to other site, unspecified laterality (HCC)    Intracranial hemorrhage (HCC)    Acute hemorrhagic infarction of brain (HCC)    Palliative care by specialist      Ms. 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    Overall per family her mental status is improving and she seems more comfortable when at rest.  Due to medical history, railings needed and  used for seizure precaution and safety.  Patient is medically cleared for rehab once Auth obtained     L sided weakness  New right paramedian midbrain metastatic lesion  Syncope  - noted on CT head  - hold Eliquis, ok to restart Eliquis per per NSGY  - MRI brain without bleeding noted  - NSGY and neurology consulted  - continue keppra  - stop neurochecks  - PT/OT/SW  - per neurology, 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 Monzon from [facility] he is awaiting approval of an oral immunotherapy that may have some benefit, but not curative,  -palliative care eval  -trial of decadron, was agitated 2/27 palliative stopped provigil  -2/28 appears worse was agitated last night, sleepy this morning after IV pain meds  -continue to monitor, more confused and lethargic last week, seems to be improving with decadron  and Tagrisso  -Now improving over the weekend.  Family would like to get her to rehab and continue her oral chemo agent.  They understand that she may continue to decline and be hospice appropriate in the near future.  -Discussed with her oncologist at [facility], will transition to oral Decadron here.  Then plan for 4-week taper at discharge.      Lung Ca with recurrent malignant pleural effusion  PE hx   Small R PTX - POA   Chronic hypoxic respiratory failure on home O2  - S/p R sided PleurX placement 1/31/25  - drain scheduled MWF 9:30AM, up to 1L  - Already has 2L O2 at home - at baseline now   -Most recent chest x-ray with good placement of pleurx, however has not been draining as much, will get repeat chest x-ray today, continue to drain on schedule    Lung cancer with brain mets  - follow-up oncology as outpatient  - planned to start chemotherapy this week    - per family, had recent MRI brain a few weeks ago that showed decrease in size of brain mets  - has been on prednisone taper since November, currently on 10 mg daily-> now on decadron  - tegresso was ordered, Dr. Monzon from  wants her to start this, medication has been approved will start 2/27 per Dr. Monzon with oncology -> spit out medication on 2/27, was able to take 2/28 and 3/1 in pudding  -Will message her [facility] oncologist for Decadron taper    DVT/PE  - restart Eliquis   - 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    Hypothyroidism  - Continue synthroid     ACP  - CODE- DNR/select- updated today with POA  - POA- son Brayan updated at bedside   - lives at home with son     FN:  - IVF: stopped IVF  - Diet: regular     DVT Prophy: SCD, eliquis   Lines: port     Dispo: pending course; plan for [facility]      Dw family at bedside      Further recommendations pending patient's clinical course.  [facility] hospitalist to continue to follow patient while in house     Patient and/or patient's family given opportunity to ask questions and note  understanding and agreeing with therapeutic plan as outlined    Note: This chart was prepared using voice recognition software and may contain unintended word substitution errors.     Medically ok for dc to rehab once auth approved     Amelia Schaefer MD  Hospitalist  [facility]   Answering service: [phone]       Subjective:     Appears comfortable today.  No new issues    OBJECTIVE:    Blood pressure 123/75, pulse 92, temperature 97.8 °F (36.6 °C), temperature source Oral, resp. rate 18, height 5' 3\" (1.6 m), weight 134 lb (60.8 kg), SpO2 94%, not currently breastfeeding.    Temp:  [97.8 °F (36.6 °C)] 97.8 °F (36.6 °C)  Pulse:  [87-98] 92  Resp:  [16-18] 18  BP: (117-123)/(75-89) 123/75  SpO2:  [93 %-95 %] 94 %      Intake/Output:    Intake/Output Summary (Last 24 hours) at 3/8/2025 1420  Last data filed at 3/8/2025 0510  Gross per 24 hour   Intake 120 ml   Output 350 ml   Net -230 ml       Last 3 Weights   03/04/25 0446 134 lb (60.8 kg)   02/25/25 0507 128 lb (58.1 kg)   02/21/25 1200 126 lb 8.7 oz (57.4 kg)   02/20/25 2050 128 lb 1.4 oz (58.1 kg)   02/20/25 1146 162 lb 0.6 oz (73.5 kg)   03/23/22 0942 162 lb (73.5 kg)   01/12/22 1248 160 lb (72.6 kg)       Exam  GEN: awake, cooperative   HEENT: EOMI  Pulm: decreased breath sounds R side, no increased work of breathing  CV: RRR, no murmurs  ABD: Soft, non-tender, non-distended, +BS  Neuro: LUE/LLE weakness, follows commands      Data Review:       Labs:     Recent Labs   Lab 03/03/25  0849 03/04/25  0644   RBC 3.00* 3.34*   HGB 10.6* 11.7*   HCT 28.9* 32.5*   MCV 96.3 97.3   MCH 35.3* 35.0*   MCHC 36.7 36.0   RDW 12.9 13.0   WBC 5.5 6.1   .0* 115.0*         Recent Labs   Lab 03/03/25  0849 03/04/25  0644 03/05/25  0632   * 134* 130*   BUN 21 21 22   CREATSERUM 0.52* 0.56 0.53*   EGFRCR 97 95 96   CA 8.0* 8.2* 8.2*    144 143   K 3.8 3.9  3.9 3.9    112 111   CO2 21.0 20.0* 21.0       No results for input(s): \"ALT\", \"AST\",  \"ALB\", \"AMYLASE\", \"LIPASE\", \"LDH\" in the last 168 hours.    Invalid input(s): \"ALPHOS\", \"TBIL\", \"DBIL\", \"TPROT\"      Imaging:  No results found.      Meds:     INPATIENT MEDICATIONS    Scheduled Medications:      levETIRAcetam, 500 mg, BID  dexamethasone, 4 mg, Q8H ROSIE  Osimertinib Mesylate, 80 mg, Daily  apixaban, 5 mg, BID  famotidine, 20 mg, Daily   Or  famotidine, 20 mg, Daily  levothyroxine, 50 mcg, Daily            Drips:           PRN Medications  loperamide, 4 mg, QID PRN  guaiFENesin, 100 mg, Q4H PRN  acetaminophen, 650 mg, Q4H PRN   Or  acetaminophen, 650 mg, Q4H PRN  labetalol, 10 mg, Q10 Min PRN  hydrALAzine, 10 mg, Q2H PRN  ondansetron, 4 mg, Q6H PRN   Or  metoclopramide, 10 mg, Q8H PRN  albuterol, 1 puff, Q6H PRN

## 2025-03-08 NOTE — CM/SW NOTE
Prior Authorization - Destination  Destination Type: Skilled nursing facility  Service Provider: [facility]  Payer Communication Destination Comments: Daiana sparrow [id number]  Prior Authorization Status: Approved  Prior Authorization Start Date: 03/08/25  3/8 to 3/12      Erin Khan DSC

## 2025-03-08 NOTE — PLAN OF CARE
Leti is alert/oriented x3-4. Vitals stable on room air. Up in chair this afternoon with lift. Tolerating diet with feeding assist. Generally low appetite, improving some. Denies pain. Eliquis for DVT prophylaxis. Voiding adequately via purewick. Pleurx to be drained tonight. Bed low, locked, all safety measures in place.    Problem: Patient Centered Care  Goal: Patient preferences are identified and integrated in the patient's plan of care  Description: Interventions:  - What would you like us to know as we care for you? \"My family helps take care of me.\"  - Provide timely, complete, and accurate information to patient/family  - Incorporate patient and family knowledge, values, beliefs, and cultural backgrounds into the planning and delivery of care  - Encourage patient/family to participate in care and decision-making at the level they choose  - Honor patient and family perspectives and choices  Outcome: Progressing     Problem: Patient/Family Goals  Goal: Patient/Family Long Term Goal  Description: Patient's Long Term Goal: Feel better    Interventions:  - Educate on taking medications as prescribed  - Monitor vitals  -Up as tolerated  - See additional Care Plan goals for specific interventions  Outcome: Progressing  Goal: Patient/Family Short Term Goal  Description: Patient's Short Term Goal: No syncopal episodes    Interventions:   - EKG  - EEG  - Neurology consult  - Monitor vitals  - MRI  - See additional Care Plan goals for specific interventions  Outcome: Progressing     Problem: NEUROLOGICAL - ADULT  Goal: Achieves stable or improved neurological status  Description: INTERVENTIONS  - Assess for and report changes in neurological status  - Initiate measures to prevent increased intracranial pressure  - Maintain blood pressure and fluid volume within ordered parameters to optimize cerebral perfusion and minimize risk of hemorrhage  - Monitor temperature, glucose, and sodium. Initiate appropriate interventions  as ordered  Outcome: Progressing  Goal: Absence of seizures  Description: INTERVENTIONS  - Monitor for seizure activity  - Administer anti-seizure medications as ordered  - Monitor neurological status  Outcome: Progressing  Goal: Remains free of injury related to seizure activity  Description: INTERVENTIONS:  - Maintain airway, patient safety  and administer oxygen as ordered  - Monitor patient for seizure activity, document and report duration and description of seizure to MD/LIP  - If seizure occurs, turn patient to side and suction secretions as needed  - Reorient patient post seizure  - Seizure pads on all 4 side rails  - Instruct patient/family to notify RN of any seizure activity  - Instruct patient/family to call for assistance with activity based on assessment  Outcome: Progressing  Goal: Achieves maximal functionality and self care  Description: INTERVENTIONS  - Monitor swallowing and airway patency with patient fatigue and changes in neurological status  - Encourage and assist patient to increase activity and self care with guidance from PT/OT  - Encourage visually impaired, hearing impaired and aphasic patients to use assistive/communication devices  Outcome: Progressing     Problem: Impaired Cognition  Goal: Patient will exhibit improved attention, thought processing and/or memory  Description: Interventions  Outcome: Progressing     Problem: PAIN - ADULT  Goal: Verbalizes/displays adequate comfort level or patient's stated pain goal  Description: INTERVENTIONS:  - Encourage pt to monitor pain and request assistance  - Assess pain using appropriate pain scale  - Administer analgesics based on type and severity of pain and evaluate response  - Implement non-pharmacological measures as appropriate and evaluate response  - Consider cultural and social influences on pain and pain management  - Manage/alleviate anxiety  - Utilize distraction and/or relaxation techniques  - Monitor for opioid side effects  -  Notify MD/LIP if interventions unsuccessful or patient reports new pain  - Anticipate increased pain with activity and pre-medicate as appropriate  Outcome: Progressing     Problem: SAFETY ADULT - FALL  Goal: Free from fall injury  Description: INTERVENTIONS:  - Assess pt frequently for physical needs  - Identify cognitive and physical deficits and behaviors that affect risk of falls.  - [place] fall precautions as indicated by assessment.  - Educate pt/family on patient safety including physical limitations  - Instruct pt to call for assistance with activity based on assessment  - Modify environment to reduce risk of injury  - Provide assistive devices as appropriate  - Consider OT/PT consult to assist with strengthening/mobility  - Encourage toileting schedule  Outcome: Progressing     Problem: DISCHARGE PLANNING  Goal: Discharge to home or other facility with appropriate resources  Description: INTERVENTIONS:  - Identify barriers to discharge w/pt and caregiver  - Include patient/family/discharge partner in discharge planning  - Arrange for needed discharge resources and transportation as appropriate  - Identify discharge learning needs (meds, wound care, etc)  - Arrange for interpreters to assist at discharge as needed  - Consider post-discharge preferences of patient/family/discharge partner  - Complete POLST form as appropriate  - Assess patient's ability to be responsible for managing their own health  - Refer to Case Management Department for coordinating discharge planning if the patient needs post-hospital services based on physician/LIP order or complex needs related to functional status, cognitive ability or social support system  Outcome: Progressing

## 2025-03-09 VITALS
BODY MASS INDEX: 23.74 KG/M2 | HEIGHT: 63 IN | RESPIRATION RATE: 16 BRPM | DIASTOLIC BLOOD PRESSURE: 77 MMHG | HEART RATE: 91 BPM | TEMPERATURE: 98 F | SYSTOLIC BLOOD PRESSURE: 111 MMHG | WEIGHT: 134 LBS | OXYGEN SATURATION: 96 %

## 2025-03-09 RX ORDER — LOPERAMIDE HYDROCHLORIDE 2 MG/1
4 CAPSULE ORAL 4 TIMES DAILY PRN
Qty: 30 CAPSULE | Refills: 0 | Status: SHIPPED | OUTPATIENT
Start: 2025-03-09

## 2025-03-09 RX ORDER — OSIMERTINIB 80 1/1
80 TABLET, FILM COATED ORAL DAILY
Status: SHIPPED | COMMUNITY
Start: 2025-03-09

## 2025-03-09 RX ORDER — DEXAMETHASONE 2 MG/1
TABLET ORAL
Qty: 53 TABLET | Refills: 0 | Status: SHIPPED | OUTPATIENT
Start: 2025-03-09 | End: 2025-04-06

## 2025-03-09 RX ORDER — LEVETIRACETAM 500 MG/1
500 TABLET ORAL 2 TIMES DAILY
Qty: 60 TABLET | Refills: 0 | Status: SHIPPED | OUTPATIENT
Start: 2025-03-09

## 2025-03-09 NOTE — PLAN OF CARE
Patient a+o x2-3, drowsy. On RA. Max assist. Purewick in place with minimal output. No bm overnight. Prn albuterol and robutussin given for cough. Extended length dressing changed. Bladder scanned in am. Denies pain or nausea. Tolerating honey thick diet. Call light in reach.     Problem: Patient Centered Care  Goal: Patient preferences are identified and integrated in the patient's plan of care  Description: Interventions:  - What would you like us to know as we care for you? \"My family helps take care of me.\"  - Provide timely, complete, and accurate information to patient/family  - Incorporate patient and family knowledge, values, beliefs, and cultural backgrounds into the planning and delivery of care  - Encourage patient/family to participate in care and decision-making at the level they choose  - Honor patient and family perspectives and choices  Outcome: Progressing     Problem: Patient/Family Goals  Goal: Patient/Family Long Term Goal  Description: Patient's Long Term Goal: Feel better    Interventions:  - Educate on taking medications as prescribed  - Monitor vitals  -Up as tolerated  - See additional Care Plan goals for specific interventions  Outcome: Progressing  Goal: Patient/Family Short Term Goal  Description: Patient's Short Term Goal: No syncopal episodes    Interventions:   - EKG  - EEG  - Neurology consult  - Monitor vitals  - MRI  - See additional Care Plan goals for specific interventions  Outcome: Progressing     Problem: NEUROLOGICAL - ADULT  Goal: Achieves stable or improved neurological status  Description: INTERVENTIONS  - Assess for and report changes in neurological status  - Initiate measures to prevent increased intracranial pressure  - Maintain blood pressure and fluid volume within ordered parameters to optimize cerebral perfusion and minimize risk of hemorrhage  - Monitor temperature, glucose, and sodium. Initiate appropriate interventions as ordered  Outcome: Progressing  Goal:  Absence of seizures  Description: INTERVENTIONS  - Monitor for seizure activity  - Administer anti-seizure medications as ordered  - Monitor neurological status  Outcome: Progressing  Goal: Remains free of injury related to seizure activity  Description: INTERVENTIONS:  - Maintain airway, patient safety  and administer oxygen as ordered  - Monitor patient for seizure activity, document and report duration and description of seizure to MD/LIP  - If seizure occurs, turn patient to side and suction secretions as needed  - Reorient patient post seizure  - Seizure pads on all 4 side rails  - Instruct patient/family to notify RN of any seizure activity  - Instruct patient/family to call for assistance with activity based on assessment  Outcome: Progressing  Goal: Achieves maximal functionality and self care  Description: INTERVENTIONS  - Monitor swallowing and airway patency with patient fatigue and changes in neurological status  - Encourage and assist patient to increase activity and self care with guidance from PT/OT  - Encourage visually impaired, hearing impaired and aphasic patients to use assistive/communication devices  Outcome: Progressing     Problem: Impaired Cognition  Goal: Patient will exhibit improved attention, thought processing and/or memory  Description: Interventions:  Outcome: Progressing     Problem: PAIN - ADULT  Goal: Verbalizes/displays adequate comfort level or patient's stated pain goal  Description: INTERVENTIONS:  - Encourage pt to monitor pain and request assistance  - Assess pain using appropriate pain scale  - Administer analgesics based on type and severity of pain and evaluate response  - Implement non-pharmacological measures as appropriate and evaluate response  - Consider cultural and social influences on pain and pain management  - Manage/alleviate anxiety  - Utilize distraction and/or relaxation techniques  - Monitor for opioid side effects  - Notify MD/LIP if interventions  unsuccessful or patient reports new pain  - Anticipate increased pain with activity and pre-medicate as appropriate  Outcome: Progressing     Problem: SAFETY ADULT - FALL  Goal: Free from fall injury  Description: INTERVENTIONS:  - Assess pt frequently for physical needs  - Identify cognitive and physical deficits and behaviors that affect risk of falls.  - [place] fall precautions as indicated by assessment.  - Educate pt/family on patient safety including physical limitations  - Instruct pt to call for assistance with activity based on assessment  - Modify environment to reduce risk of injury  - Provide assistive devices as appropriate  - Consider OT/PT consult to assist with strengthening/mobility  - Encourage toileting schedule  Outcome: Progressing     Problem: DISCHARGE PLANNING  Goal: Discharge to home or other facility with appropriate resources  Description: INTERVENTIONS:  - Identify barriers to discharge w/pt and caregiver  - Include patient/family/discharge partner in discharge planning  - Arrange for needed discharge resources and transportation as appropriate  - Identify discharge learning needs (meds, wound care, etc)  - Arrange for interpreters to assist at discharge as needed  - Consider post-discharge preferences of patient/family/discharge partner  - Complete POLST form as appropriate  - Assess patient's ability to be responsible for managing their own health  - Refer to Case Management Department for coordinating discharge planning if the patient needs post-hospital services based on physician/LIP order or complex needs related to functional status, cognitive ability or social support system  Outcome: Progressing

## 2025-03-09 NOTE — PLAN OF CARE
Problem: Adult Inpatient Plan of Care  Goal: Optimal Comfort and Wellbeing  Outcome: Ongoing, Progressing  Intervention: Provide Person-Centered Care  Flowsheets (Taken 1/18/2024 1448)  Trust Relationship/Rapport:   care explained   choices provided   emotional support provided   empathic listening provided   questions answered   questions encouraged   reassurance provided   thoughts/feelings acknowledged      No acute changes today. Family at bedside. Up with sling to chair today for meal. Purewick maintained. Awaiting ins auth - dc pending clearance.     Problem: Patient Centered Care  Goal: Patient preferences are identified and integrated in the patient's plan of care  Description: Interventions:  - What would you like us to know as we care for you? \"My family helps take care of me.\"  - Provide timely, complete, and accurate information to patient/family  - Incorporate patient and family knowledge, values, beliefs, and cultural backgrounds into the planning and delivery of care  - Encourage patient/family to participate in care and decision-making at the level they choose  - Honor patient and family perspectives and choices  Outcome: Progressing     Problem: Patient/Family Goals  Goal: Patient/Family Long Term Goal  Description: Patient's Long Term Goal: Feel better    Interventions:  - Educate on taking medications as prescribed  - Monitor vitals  -Up as tolerated  - See additional Care Plan goals for specific interventions  Outcome: Progressing  Goal: Patient/Family Short Term Goal  Description: Patient's Short Term Goal: No syncopal episodes    Interventions:   - EKG  - EEG  - Neurology consult  - Monitor vitals  - MRI  - See additional Care Plan goals for specific interventions  Outcome: Progressing     Problem: NEUROLOGICAL - ADULT  Goal: Achieves stable or improved neurological status  Description: INTERVENTIONS  - Assess for and report changes in neurological status  - Initiate measures to prevent increased intracranial pressure  - Maintain blood pressure and fluid volume within ordered parameters to optimize cerebral perfusion and minimize risk of hemorrhage  - Monitor temperature, glucose, and sodium. Initiate appropriate interventions as ordered  Outcome: Progressing  Goal: Absence of seizures  Description: INTERVENTIONS  - Monitor for seizure activity  - Administer anti-seizure medications as ordered  -  Monitor neurological status  Outcome: Progressing  Goal: Remains free of injury related to seizure activity  Description: INTERVENTIONS:  - Maintain airway, patient safety  and administer oxygen as ordered  - Monitor patient for seizure activity, document and report duration and description of seizure to MD/LIP  - If seizure occurs, turn patient to side and suction secretions as needed  - Reorient patient post seizure  - Seizure pads on all 4 side rails  - Instruct patient/family to notify RN of any seizure activity  - Instruct patient/family to call for assistance with activity based on assessment  Outcome: Progressing  Goal: Achieves maximal functionality and self care  Description: INTERVENTIONS  - Monitor swallowing and airway patency with patient fatigue and changes in neurological status  - Encourage and assist patient to increase activity and self care with guidance from PT/OT  - Encourage visually impaired, hearing impaired and aphasic patients to use assistive/communication devices  Outcome: Progressing     Problem: Impaired Cognition  Goal: Patient will exhibit improved attention, thought processing and/or memory  Description: Interventions:  Outcome: Progressing     Problem: PAIN - ADULT  Goal: Verbalizes/displays adequate comfort level or patient's stated pain goal  Description: INTERVENTIONS:  - Encourage pt to monitor pain and request assistance  - Assess pain using appropriate pain scale  - Administer analgesics based on type and severity of pain and evaluate response  - Implement non-pharmacological measures as appropriate and evaluate response  - Consider cultural and social influences on pain and pain management  - Manage/alleviate anxiety  - Utilize distraction and/or relaxation techniques  - Monitor for opioid side effects  - Notify MD/LIP if interventions unsuccessful or patient reports new pain  - Anticipate increased pain with activity and pre-medicate as appropriate  Outcome: Progressing      Problem: SAFETY ADULT - FALL  Goal: Free from fall injury  Description: INTERVENTIONS:  - Assess pt frequently for physical needs  - Identify cognitive and physical deficits and behaviors that affect risk of falls.  - [place] fall precautions as indicated by assessment.  - Educate pt/family on patient safety including physical limitations  - Instruct pt to call for assistance with activity based on assessment  - Modify environment to reduce risk of injury  - Provide assistive devices as appropriate  - Consider OT/PT consult to assist with strengthening/mobility  - Encourage toileting schedule  Outcome: Progressing     Problem: DISCHARGE PLANNING  Goal: Discharge to home or other facility with appropriate resources  Description: INTERVENTIONS:  - Identify barriers to discharge w/pt and caregiver  - Include patient/family/discharge partner in discharge planning  - Arrange for needed discharge resources and transportation as appropriate  - Identify discharge learning needs (meds, wound care, etc)  - Arrange for interpreters to assist at discharge as needed  - Consider post-discharge preferences of patient/family/discharge partner  - Complete POLST form as appropriate  - Assess patient's ability to be responsible for managing their own health  - Refer to Case Management Department for coordinating discharge planning if the patient needs post-hospital services based on physician/LIP order or complex needs related to functional status, cognitive ability or social support system  Outcome: Progressing

## 2025-03-09 NOTE — DISCHARGE INSTRUCTIONS
Follow up with your oncologist as previously directed.      Decadron taper:  4mg twice a day for 7 days then 4 mg daily for 7 days then 2mg daily for 7 days then 1 mg daily for 7 days     Pleurx should be drained Monday, Wednesday, Friday scheduled.

## 2025-03-09 NOTE — SLP NOTE
SPEECH DAILY NOTE - INPATIENT    ASSESSMENT & PLAN   ASSESSMENT    Patient seen for dysphagia f/u per plan of care, goals addressed/updated with details in grid below. Patient received upright in bed with fair alertness and engagement, in NAD. Vocal quality weak with low vocal intensity. Most recent CXR 3/4/25 reviewed. RN authorizes visit, endorses tolerance for current modified diet. Patient's two sons at bedside, supportive and receptive to education.    Patient continues to present with evidence oropharyngeal dysphagia at bedside in context of progressive illness including fluctuations in mentation/JUANCARLOS. Today, patient demonstrates fair clinical tolerance for small volumes mildly thick liquids and appears appropriate for diet advancement as below with ongoing 1:1 assist/support for aspiration and safe swallow precautions. Thorough family training/education provided including written handout for discharge. Recommend continue SLP service in next level of care to continue dysphagia management. Acute SLP service will continue to follow while in house. Plan and recommendations reviewed with patient, patient's sons and RN at bedside. Written recommendations posted on whiteboard.     Diet Recommendations - Solids: Mechanical soft chopped/ Soft & Bite Sized  Diet Recommendations - Liquids: Nectar thick liquids/ Mildly thick  Compensatory Strategies Recommended: Liquids via spoon, Extra sauce/gravy, Alternate consistencies  Aspiration Precautions: Upright position, Slow rate, Small bites, Small sips, No straw, 1:1 feeding  Medication Administration Recommendations: Crushed in puree    Patient Experiencing Pain: No    Interdisciplinary Communication: Discussed with RN  Recommendations posted at bedside          GOALS  Goal #1 The patient will tolerate soft bite size consistency and teaspoon amounts of moderately thick liquids without overt signs or symptoms of aspiration with 100 % accuracy over '2-3 session(s).  Patient  accepted bites of soft solids and tsps moderately thick liquids with fair oropharyngeal timing and control and without overt signs aspiration/distress.     NEW GOAL 3/9/25:  Patient will demonstrate safe and efficient clinical tolerance for soft/bite size diet and mildly thick liquids without overt signs aspiration/distress x1-2 f/u visits.      Goal Met; Modified   Goal #2 The patient/family/caregiver will demonstrate understanding and implementation of aspiration precautions and swallow strategies independently over 2 session(s).  Thorough patient/family education provided including written handout for discharge. Patient's sons verbalize/demonstrate understanding.     In Progress   Goal #3 The patient will utilize compensatory strategies as outlined by  BSSE (clinical evaluation) including Slow rate, Small bites, Multiple swallows, Alternate liquids/solids, No straws, Upright 90 degrees, Liquids via tsp amount only, 1/2 tsp amounts, Feed patient, feed pt only when alert with mod assistance 95 % of the time across 2 sessions.  Patient positioned upright in bed, distractions minimized, safe swallow precautions reviewed/modeled, 1:1 assistance provided. Patient did self-administer cup sips mildly thick liquids with adequate volume control and safe pace.     In Progress   Goal #4 The patient will tolerate trial upgrade of soft solids consistency and mildly thick liquids without overt signs or symptoms of aspiration with 100 % accuracy over 2 session(s).  Patient demonstrated fair clinical tolerance for mildly thick liquids via tsp by SLP and self-administered via tiny cup sip without overt signs aspiration/distress. Appears appropriate for advancement to mildly thick liquids at this time.     Goal Partially Met     FOLLOW UP  Follow Up Needed (Documentation Required): Yes  SLP Follow-up Date: 03/11/25  Duration: 1 week    Session: 6 following BSE    If you have any questions, please contact Rachel Dill, CORI Ramos  CIERRA Dill. CCC-SLP  Speech-Language Pathologist  [id number]

## 2025-03-09 NOTE — PLAN OF CARE
Telephone handoff/transfer report provided to intake/RN \"Ted\" at [facility]. Plan to transfer via [facility] later this afternoon. Family aware of transfer plan and all in agreement. Patient expresses understanding.     Patient home med/chemo \"[place]\" was given back to family/son. They state they will transport the medication to [facility].     Awaiting transport.

## 2025-03-09 NOTE — DISCHARGE SUMMARY
[facility]  part of [facility] Internal Medicine Discharge Summary   Patient ID:  Damari Hong  [id number]  74 year old  5/21/1950    Admit date: 2/20/2025    Discharge date and time: 3/9/2025  2:11 PM      Attending Physician: Marion att. providers found     Primary Care Physician: Shruthi Simon MD     Reason for admission syncope altered mental status, metastatic cancer (see HPI on HP for further detail)    Discharged Condition: stable    Disposition: SNF    Risk of Readmission Lace+ Score: 65  59-90 High Risk  29-58 Medium Risk  0-28   Low Risk    Important follow up:  DC to skilled nursing facility, follow-up with PCP within 1 week of leaving rehab.  Follow-up with outpatient oncologist.  He was message via [employer] about discharge    Discharge meds  I reviewed and reconciled current and discharge medications on the day of discharge with the changes reflected below.       Medication List        START taking these medications      dexamethasone 2 MG Tabs  Commonly known as: Decadron  Take 2 tablets (4 mg total) by mouth 2 (two) times daily with meals for 7 days, THEN 2 tablets (4 mg total) daily with breakfast for 7 days, THEN 1 tablet (2 mg total) daily with breakfast for 7 days, THEN 0.5 tablets (1 mg total) daily with breakfast for 7 days.  Start taking on: March 9, 2025     guaiFENesin 100 MG/5 ML  Commonly known as: Robitussin  Take 5 mL (100 mg total) by mouth every 4 (four) hours as needed.     levETIRAcetam 500 MG Tabs  Commonly known as: Keppra  Take 1 tablet (500 mg total) by mouth 2 (two) times daily.     loperamide 2 MG Caps  Commonly known as: Imodium  Take 2 capsules (4 mg total) by mouth 4 (four) times daily as needed for Diarrhea.     Tagrisso 80 MG Tabs  Generic drug: Osimertinib Mesylate  Notes to patient: Your home medication was given to your family            CONTINUE taking these medications      acetaminophen 325 MG Tabs  Commonly known as: Tylenol     albuterol  108 (90 Base) MCG/ACT Aers  Commonly known as: Ventolin HFA     apixaban 5 MG Tabs  Commonly known as: Eliquis     benzonatate 100 MG Caps  Commonly known as: Tessalon     Biotin 1000 MCG Tabs     folic acid 1 MG Tabs  Commonly known as: Folvite     Omeprazole 40 MG Cpdr  Take 1 capsule (40 mg total) by mouth daily. Before meal     Unithroid 50 MCG Tabs  Generic drug: levothyroxine  Take 1 tablet (50 mcg total) by mouth daily.     Vitamin D 50 MCG (2000 UT) Tabs            STOP taking these medications      mirtazapine 7.5 MG Tabs  Commonly known as: Remeron     potassium chloride 20 MEQ Tbcr  Commonly known as: Klor-Con M20     predniSONE 20 MG Tabs  Commonly known as: Deltasone               Where to Get Your Medications        You can get these medications from any pharmacy    Bring a paper prescription for each of these medications  dexamethasone 2 MG Tabs  guaiFENesin 100 MG/5 ML  levETIRAcetam 500 MG Tabs  loperamide 2 MG Caps       HPI per chart  Ms. 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      History obtained mostly from son. Patient lives with son, was recently admitted at [facility] about 1 month ago with the flu and hasn't fully recovered since. Mostly bed/wheelchair bound since admission, able to transfer from bed to chair with stand and pivot. Normally has some issues with word finding and slow sleech. Son helped patient go to the bathroom at 2AM and got her back into bed then checked on her in the morning at 7AM and found her on the floor with some dried blood on her lips. Thinks she slid out of bed sometime in the night. He called EMS who was able to get her back to bed and do an assessment. Had normal vitals, no obvious fractures or significant bruising, offered to take her to the ER but she also had an  oncologist appt that morning so they planned to go to that instead. While she was getting blood drawn from her port, she became unresponsive. Son felt like she was having a seizure however no seizure like activity noted but she was in and out of consciousness. EMS called and brought her here. Son notes that her speech is a little slower than usual and she is having more word finding difficulty than normal. Normally has some double vision. Has had generlized weakness since last admission but no unilateral weakness that family was aware of.      In the ED, BP Stable, tachycardic, O2 sat stable on home 2L NC. Labs without significant abnormality. CT head concern for hemorrhagic metastasis. NSGY consulted.   Hospital Course  Ms. 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 Overall per family her mental status is improving and she seems more comfortable when at rest. Due to medical history, railings needed and used for seizure precaution and safety. Patient is medically cleared for rehab once Auth obtained   Discharge Diagnoses:   L sided weakness  New right paramedian midbrain metastatic lesion  Syncope  - noted on CT head  - hold Eliquis, ok to restart Eliquis per per NSGY  - MRI brain without bleeding noted  - NSGY and neurology consulted  - continue keppra  - stop neurochecks  - PT/OT/SW  - per neurology, 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 Monzon from [facility] he is  awaiting approval of an oral immunotherapy that may have some benefit, but not curative,  -palliative care eval  -trial of decadron, was agitated 2/27 palliative stopped provigil  -2/28 appears worse was agitated last night, sleepy this morning after IV pain meds  -continue to monitor, more confused and lethargic last week, seems to be improving with decadron and Tagrisso  -Now improving over the weekend.  Family would like to get her to rehab and continue her oral chemo agent.  They understand that she may continue to decline and be hospice appropriate in the near future.  -Discussed with her oncologist at [facility], will transition to oral Decadron here.  Then plan for 4-week taper at discharge.       Lung Ca with recurrent malignant pleural effusion  PE hx   Small R PTX - POA   Chronic hypoxic respiratory failure on home O2  - S/p R sided PleurX placement 1/31/25  - drain scheduled MWF 9:30AM, up to 1L  - Already has 2L O2 at home - at baseline now   -Most recent chest x-ray with good placement of pleurx, however has not been draining as much, will get repeat chest x-ray today, continue to drain on schedule    Lung cancer with brain mets  - follow-up oncology as outpatient  - planned to start chemotherapy this week    - per family, had recent MRI brain a few weeks ago that showed decrease in size of brain mets  - has been on prednisone taper since November, currently on 10 mg daily-> now on decadron  - tegresso was ordered, Dr. Monzon from  wants her to start this, medication has been approved will start 2/27 per Dr. Monzon with oncology -> spit out medication on 2/27, was able to take 2/28 and 3/1 in pudding  -Will message her [facility] oncologist for Decadron taper    DVT/PE  - restart Eliquis   - 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    Hypothyroidism  - Continue synthroid    Consults: IP CONSULT TO NEUROSURGERY  IP CONSULT TO NEUROLOGY  IP CONSULT TO SOCIAL WORK  IP CONSULT TO SOCIAL  WORK  NURSING CONSULT TO DIETITIAN  IP CONSULT TO SOCIAL WORK  IP CONSULT TO SOCIAL WORK  IP CONSULT TO SOCIAL WORK  IP CONSULT PALLIATIVE CARE  CONSULT  - HOME HEALTH  IP CONSULT TO INTERVENTIONAL RADIOLOGY    Radiology: XR CHEST AP PORTABLE  (CPT=71045)    Result Date: 3/4/2025  PROCEDURE: XR CHEST AP PORTABLE  (CPT=71045) TIME: 12:08 p.m.  COMPARISON: [facility], XR CHEST AP PORTABLE (CPT=71045), 3/02/2025, 10:24 AM.  INDICATIONS: Cough and shortness of breath.  TECHNIQUE:   Single view.           CONCLUSION:  1. Right-sided PleurX catheter in place.  Stable loculated right pleural effusion and poor aeration of the right lung with stable ground-glass attenuation opacity in right lung which may be related to interstitial spread of tumor, edema, or pneumonia. 2. Left lung remains clear. 3. Right-sided Port-A-Cath with tip in superior right atrium unchanged.     Dictated by (CST): Nasir Jamil MD on 3/04/2025 at 6:29 PM     Finalized by (CST): Nasir Jamil MD on 3/04/2025 at 6:32 PM          XR CHEST AP PORTABLE  (CPT=71045)    Result Date: 3/2/2025  PROCEDURE: XR CHEST AP PORTABLE  (CPT=71045) TIME: 10:24.   COMPARISON: None.  INDICATIONS: cough, shortness of breath, concern for fluid overload vs. aspiration vs. pneumonitis from chemo med  TECHNIQUE:   Single view.   FINDINGS:  CARDIAC/VASC: The cardiomediastinal silhouette is enlarged.  There is atherosclerotic calcification of the tortuous thoracic aorta MEDIAST/DAREN:   The right mediastinum/hilum are obscured. LUNGS/PLEURA: Large right pleural effusion with associated multifocal opacities in the right lung.  No pneumothorax. BONES: Multilevel degenerative changes of the thoracic spine.  Severe left glenohumeral joint osteoarthrosis with associated contour deformity of the left humeral head. OTHER: There is a right chest wall port that terminates at the cavoatrial junction.  There is a right pleural drainage catheter, which  terminates superiorly.  There are surgical clips in the right upper quadrant of the abdomen.         CONCLUSION:   Large right pleural effusion with associated multifocal opacities in right lung, which may reflect atelectasis, pneumonitis, pulmonary edema, or pneumonia.  Well-positioned right pleural drainage catheter and right chest wall port.     Dictated by (CST): Matt Campuzano MD on 3/02/2025 at 11:22 AM     Finalized by (CST): Matt Campuzano MD on 3/02/2025 at 11:24 AM          MRI BRAIN (W+WO) (CPT=70553)    Result Date: 2/21/2025  PROCEDURE: MRI BRAIN (W+WO) (CPT=70553)  COMPARISON: [facility], CT BRAIN OR HEAD (CPT=70450), 2/20/2025, 12:29 PM.  INDICATIONS: brain mets, new L sided weakness  TECHNIQUE: A variety of imaging planes and parameters were utilized for visualization of suspected pathology.  Images were performed without and with gadolinium contrast.   FINDINGS:  CEREBRUM: Multiple small enhancing metastatic lesions with the largest lesions measuring 8 mm in the left frontal convexity and 7 mm in the right inferior frontal lobe.  There are at least 10 enhancing lesions in the left cerebral hemisphere .  In right cerebral hemisphere there are at least 7 small enhancing lesions.  There are 3 enhancing lesions in the left thalamus with largest measuring 6 mm, and 2 enhancing lesions in the right thalamus with the largest measuring 6 mm.  There are multi focal FLAIR hyperintense foci in the periventricular white matter, and subcortical white matter bilaterally.  There is hemosiderin deposition at a old left subinsular/basal ganglionic infarct.  No acute infarct, large region of vasogenic  edema or large mass.  Few small metastatic lesions have associated hemosiderin deposition. CEREBELLUM: Multiple small enhancing cerebellar lesions with largest in the left cerebellar hemisphere measuring 5 mm and largest in the right hemisphere measuring 4 mm.  Few lesions have hemosiderin deposition  as evidence by susceptibility artifact. BRAINSTEM: Enhancing 7 mm lesion in the right posterior mid brain.  Small focus of hemosiderin deposition associated with the right midbrain lesion.  Patchy pathologic signal in the sola. CSF SPACES: No hydrocephalus, subarachnoid hemorrhage, or mass.  SKULL: No mass or other significant visible lesion.  SINUSES: Limited views demonstrate no significant mucosal thickening or fluid.  ORBITS: Limited views are unremarkable.  OTHER: No restricted diffusion.  Flow present in anterior and posterior circulation vessels.         CONCLUSION:  1. Multiple supra tentorial brain metastases, right paramedian midbrain metastasis, and multiple small cerebellar metastases.  No large area of vasogenic edema/significant mass effect or midline shift.  No hydrocephalus.  No acute infarct.    Dictated by (CST): Nasir Jamil MD on 2/21/2025 at 6:17 PM     Finalized by (CST): Nasir Jamil MD on 2/21/2025 at 6:27 PM          CT SPINE CERVICAL (CPT=72125)    Result Date: 2/20/2025  PROCEDURE: CT SPINE CERVICAL (CPT=72125)  COMPARISON: None available.  INDICATIONS: Fall with posttraumatic neck pain.  TECHNIQUE: Multidetector CT images of the cervical spine were obtained without the infusion of non-ionic intravenous contrast material. Automated exposure control for dose reduction was used. Adjustment of the mA and/or kV was done based on the patient's  size. Iterative reconstruction technique for dose reduction was employed. Dose information was transmitted to the ACR (American College of Radiology) NRDR (National Radiology Data Registry), which includes the Dose Index Registry.   FINDINGS: ALIGNMENT: The anatomic cervical lordosis is reversed. BONES: No fractures are apparent. There is an indeterminate relatively sclerotic ovoid lesion of the C3 vertebral segment measuring 0.8 x 0.8 x 0.8 cm. A sclerotic lesion at the inferior endplate of C7 measures 1.3 x 1.2 x 1.0 cm. An additional smaller  lesion of C4 is present. A lesion involving the left transverse process of C6 is present. Sclerotic lesions of the facet joints are also observed. A few small lesions of T2 are present.  CRANIOCERVICAL AREA: Normal foramen magnum without Chiari malformation.  CERVICAL DISC LEVELS: There is degeneration between the anterior arch of C1 and the odontoid process. There is no rotational abnormality of the atlantoaxial articulation. Posterior disc osteophyte complex formation is present at C4-C5, C5-C6, and C6-C7. Bulky multilevel facet arthrosis is observed. These findings contribute to spinal canal and substantial foraminal impingement. PARASPINAL AREA: No visible mass.  OTHER: There is no visible swelling of the prevertebral soft tissues. Scarring of the right apex is partially visualized. A right-sided chest port catheter is partially visualized.          CONCLUSION:  1. No acute fracture or posttraumatic malalignment cervical spine.  2. Numerous osteoblastic presumed osseous metastases.  3. Multilevel degenerative changes of the cervical spine are apparent.  4. Lesser incidental findings as above.    Dictated by (CST): Rayo Curiel MD on 2/20/2025 at 12:59 PM     Finalized by (CST): Rayo Curiel MD on 2/20/2025 at 1:02 PM          CT BRAIN OR HEAD (CPT=70450)    Result Date: 2/20/2025  PROCEDURE: CT BRAIN OR HEAD (CPT=70450)  COMPARISON: None.  INDICATIONS: fall, brain mets  TECHNIQUE: CT images were obtained without contrast material.  Automated exposure control for dose reduction was used.  Dose information is transmitted to the ACR (American College of Radiology) NRDR (National Radiology Data Registry) which includes the Dose Index Registry.  FINDINGS:    There is a very small ill-defined area of decreased attenuation at the superior aspect of the left frontoparietal lobe.  There is a small focal area of increased attenuation at the periphery of this ill-defined area.  The findings could represent a  hemorrhagic metastasis.  There is no mass effect or associated abnormality.  There are no other similar appearing areas to suggest brain metastasis.  However, noncontrast CT is ineffective for assessing subtle metastases.  There are minimal global atrophic changes within the cerebral and cerebellar hemispheres.  There are small sized areas of low-attenuation in the periventricular and deep subcortical white matter compatible with chronic ischemic white matter changes.  The ventricular system appears grossly normal with no evidence of hydrocephalus or ventricular obstruction.  On bone windows, there is no calvarial fracture.  On soft tissue windows, there is no gross soft tissue abnormality.            CONCLUSION: Very small subtle area of decreased attenuation within the left frontoparietal lobe with small focal area of of peripheral increased density.  The findings could represent a hemorrhagic metastasis.  However this is an equivocal finding.  There is no mass effect or associated abnormality.  There are no similar appearing areas within the brain. The remainder of the exam is unremarkable.    Dictated by (CST): Aki Morrison MD on 2/20/2025 at 12:45 PM     Finalized by (CST): Aki Morrison MD on 2/20/2025 at 12:52 PM             Operative Procedures:      Day of discharge pretty awake this morning.  Eating a little bit.  Overall drinks better than she eats but the family continues to try to encourage her.  She has no new complaints or questions    Exam  Vitals:    03/09/25 0528   BP: 111/77   Pulse: 91   Resp: 16   Temp: 97.9 °F (36.6 °C)     No acute distress, alert and oriented to self and family, cooperative follows simple commands, mainly let her family do most of the talking.  Lungs Clear  Heart Regular  Abdomen Benign    Total Time Coordinating Care: > than 30 minutes  Note: This chart was prepared using voice recognition software and may contain unintended word substitution errors.     Discussed  with sons at bedside, oncologist message via [employer]  Patient had opportunity to ask questions and state understand and agree with therapeutic plan as outlined

## 2025-03-09 NOTE — CM/SW NOTE
03/09/25 1057   Discharge disposition   Expected discharge disposition subacute   Post Acute Care Provider Other  ([facility])   Discharge transportation [facility]     The patient received a MDO for discharge.    The patient will be transported to [facility] via [facility] at 1:30pm.  PCS complete.    The number to call report is [phone].  The MOD is aware of transport time.    RN to inform patient and family.    SW/CM to remain available for support and/or discharge planning.     Shyla Wiggins MSW, LSW  Discharge Planner [id number]

## 2025-03-09 NOTE — PLAN OF CARE
Insurance auth approved per  - awaiting bed. Discharge order received pending bed.     Problem: Patient Centered Care  Goal: Patient preferences are identified and integrated in the patient's plan of care  Description: Interventions:  - What would you like us to know as we care for you? \"My family helps take care of me.\"  - Provide timely, complete, and accurate information to patient/family  - Incorporate patient and family knowledge, values, beliefs, and cultural backgrounds into the planning and delivery of care  - Encourage patient/family to participate in care and decision-making at the level they choose  - Honor patient and family perspectives and choices  Outcome: Adequate for Discharge     Problem: Patient/Family Goals  Goal: Patient/Family Long Term Goal  Description: Patient's Long Term Goal: Feel better    Interventions:  - Educate on taking medications as prescribed  - Monitor vitals  -Up as tolerated  - See additional Care Plan goals for specific interventions  Outcome: Adequate for Discharge  Goal: Patient/Family Short Term Goal  Description: Patient's Short Term Goal: No syncopal episodes    Interventions:   - EKG  - EEG  - Neurology consult  - Monitor vitals  - MRI  - See additional Care Plan goals for specific interventions  Outcome: Adequate for Discharge     Problem: NEUROLOGICAL - ADULT  Goal: Achieves stable or improved neurological status  Description: INTERVENTIONS  - Assess for and report changes in neurological status  - Initiate measures to prevent increased intracranial pressure  - Maintain blood pressure and fluid volume within ordered parameters to optimize cerebral perfusion and minimize risk of hemorrhage  - Monitor temperature, glucose, and sodium. Initiate appropriate interventions as ordered  Outcome: Adequate for Discharge  Goal: Absence of seizures  Description: INTERVENTIONS  - Monitor for seizure activity  - Administer anti-seizure medications as ordered  - Monitor  neurological status  Outcome: Adequate for Discharge  Goal: Remains free of injury related to seizure activity  Description: INTERVENTIONS:  - Maintain airway, patient safety  and administer oxygen as ordered  - Monitor patient for seizure activity, document and report duration and description of seizure to MD/LIP  - If seizure occurs, turn patient to side and suction secretions as needed  - Reorient patient post seizure  - Seizure pads on all 4 side rails  - Instruct patient/family to notify RN of any seizure activity  - Instruct patient/family to call for assistance with activity based on assessment  Outcome: Adequate for Discharge  Goal: Achieves maximal functionality and self care  Description: INTERVENTIONS  - Monitor swallowing and airway patency with patient fatigue and changes in neurological status  - Encourage and assist patient to increase activity and self care with guidance from PT/OT  - Encourage visually impaired, hearing impaired and aphasic patients to use assistive/communication devices  Outcome: Adequate for Discharge     Problem: Impaired Cognition  Goal: Patient will exhibit improved attention, thought processing and/or memory  Description: Interventions:  - Minimize distractions in the room when full attention is required  Outcome: Adequate for Discharge     Problem: PAIN - ADULT  Goal: Verbalizes/displays adequate comfort level or patient's stated pain goal  Description: INTERVENTIONS:  - Encourage pt to monitor pain and request assistance  - Assess pain using appropriate pain scale  - Administer analgesics based on type and severity of pain and evaluate response  - Implement non-pharmacological measures as appropriate and evaluate response  - Consider cultural and social influences on pain and pain management  - Manage/alleviate anxiety  - Utilize distraction and/or relaxation techniques  - Monitor for opioid side effects  - Notify MD/LIP if interventions unsuccessful or patient reports new  pain  - Anticipate increased pain with activity and pre-medicate as appropriate  Outcome: Adequate for Discharge     Problem: SAFETY ADULT - FALL  Goal: Free from fall injury  Description: INTERVENTIONS:  - Assess pt frequently for physical needs  - Identify cognitive and physical deficits and behaviors that affect risk of falls.  - Adrian fall precautions as indicated by assessment.  - Educate pt/family on patient safety including physical limitations  - Instruct pt to call for assistance with activity based on assessment  - Modify environment to reduce risk of injury  - Provide assistive devices as appropriate  - Consider OT/PT consult to assist with strengthening/mobility  - Encourage toileting schedule  Outcome: Adequate for Discharge     Problem: DISCHARGE PLANNING  Goal: Discharge to home or other facility with appropriate resources  Description: INTERVENTIONS:  - Identify barriers to discharge w/pt and caregiver  - Include patient/family/discharge partner in discharge planning  - Arrange for needed discharge resources and transportation as appropriate  - Identify discharge learning needs (meds, wound care, etc)  - Arrange for interpreters to assist at discharge as needed  - Consider post-discharge preferences of patient/family/discharge partner  - Complete POLST form as appropriate  - Assess patient's ability to be responsible for managing their own health  - Refer to Case Management Department for coordinating discharge planning if the patient needs post-hospital services based on physician/LIP order or complex needs related to functional status, cognitive ability or social support system  Outcome: Adequate for Discharge

## 2025-03-09 NOTE — PLAN OF CARE
Orders received for patient discharge. Patient transported [facility] via [facility]. All discharge instructions were discussed with patient and/or family - REPORT GIVEN TO ACCEPTING FACILITY. Patient verbalizes understanding and agreeable to plan of care & follow-up plan as outlined in discharge summary / AVS and had no further questions regarding discharge instruction. All patient belongings were transported with patient/family, WITH [facility], at time of discharge. Printed Rx x4 provided with AVS/Transfer documents.    Pleurx drain in place, clamped. Chest port not accessed. Chemo med given returned to family.

## 2025-03-10 NOTE — PAYOR COMM NOTE
--------------  CONTINUED STAY REVIEW    Payor: [employer] MEDICARE ADV PPO  Subscriber #:  [id number]  Authorization Number: [id number]    Admit date: 2/20/25  Admit time:  8:41 PM    3/6 hospitalist     Assessment/Plan:      Principal Problem:    Delirium, acute  Active Problems:    Dehydration    Primary malignant neoplasm of lung metastatic to other site, unspecified laterality (HCC)    Intracranial hemorrhage (HCC)    Acute hemorrhagic infarction of brain (HCC)    Palliative care by specialist       Ms. 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    Overall per family her mental status is improving and she seems more comfortable when at rest.  Due to medical history, railings needed and  used for seizure precaution and safety.       L sided weakness  New right paramedian midbrain metastatic lesion  Syncope  - noted on CT head  - hold Eliquis, ok to restart Eliquis per per NSGY  - MRI brain without bleeding noted  - NSGY and neurology consulted  - continue keppra  - stop neurochecks  - PT/OT/SW  - per neurology, 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 Monzon from [facility] he is awaiting approval of an oral immunotherapy that may have some benefit, but not curative,  -palliative care eval  -trial of decadron, was agitated 2/27 palliative stopped provigil  -2/28 appears worse was agitated last night, sleepy this morning after IV pain meds  -continue to monitor, more confused and  lethargic last week, seems to be improving with decadron and Tagrisso  -Now improving over the weekend.  Family would like to get her to rehab and continue her oral chemo agent.  They understand that she may continue to decline and be hospice appropriate in the near future.  -Discussed with her oncologist at [facility], will transition to oral Decadron here.  Then plan for 4-week taper at discharge.       Lung Ca with recurrent malignant pleural effusion  PE hx   Small R PTX - POA   Chronic hypoxic respiratory failure on home O2  - S/p R sided PleurX placement 1/31/25  - drain scheduled MWF 9:30AM, up to 1L  - Already has 2L O2 at home - at baseline now   -Most recent chest x-ray with good placement of pleurx, however has not been draining as much, will get repeat chest x-ray today, continue to drain on schedule    Lung cancer with brain mets  - follow-up oncology as outpatient  - planned to start chemotherapy this week    - per family, had recent MRI brain a few weeks ago that showed decrease in size of brain mets  - has been on prednisone taper since November, currently on 10 mg daily-> now on decadron  - tegresso was ordered, Dr. Monzon from  wants her to start this, medication has been approved will start 2/27 per Dr. Monzon with oncology -> spit out medication on 2/27, was able to take 2/28 and 3/1 in pudding  -Will message her [facility] oncologist for Decadron taper    DVT/PE  - restart Eliquis   - 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    Hypothyroidism  - Continue synthroid     ACP  - CODE- DNR/select- updated today with POA  - POA- son Brayan updated at bedside   - lives at home with son     FN:  - IVF: stopped IVF  - Diet: regular     DVT Prophy: SCD, eliquis   Lines: port     Dispo: pending course; plan for [facility]       Dw family at bedside      Further recommendations pending patient's clinical course.  [facility] hospitalist to continue to follow patient while in house     Patient and/or patient's  family given opportunity to ask questions and note understanding and agreeing with therapeutic plan as outlined     Note: This chart was prepared using voice recognition software and may contain unintended word substitution errors.      Medically ok for dc to rehab once auth approved      Amelia Schaefer MD  Hospitalist  [facility]   Answering service: [phone]         Subjective:      A little fmore restless overnight but periods of wakefulness she is doing well, cooperative and interacting appropriately with family and staff.  Not requiring any prn meds     OBJECTIVE:     Blood pressure 114/82, pulse 102, temperature 96.5 °F (35.8 °C), temperature source Axillary, resp. rate 18, height 5' 3\" (1.6 m), weight 134 lb (60.8 kg), SpO2 90%, not currently breastfeeding.     Temp:  [96.5 °F (35.8 °C)-97.6 °F (36.4 °C)] 96.5 °F (35.8 °C)  Pulse:  [] 102  Resp:  [18] 18  BP: (113-123)/(72-82) 114/82  SpO2:  [90 %-95 %] 90 %        Intake/Output:     Intake/Output Summary (Last 24 hours) at 3/6/2025 1055  Last data filed at 3/6/2025 0930      Gross per 24 hour   Intake 120 ml   Output 250 ml   Net -130 ml              Last 3 Weights   03/04/25 0446 134 lb (60.8 kg)   02/25/25 0507 128 lb (58.1 kg)   02/21/25 1200 126 lb 8.7 oz (57.4 kg)   02/20/25 2050 128 lb 1.4 oz (58.1 kg)   02/20/25 1146 162 lb 0.6 oz (73.5 kg)   03/23/22 0942 162 lb (73.5 kg)   01/12/22 1248 160 lb (72.6 kg)         Exam  GEN: awake, cooperative   HEENT: EOMI  Pulm: decreased breath sounds R side, no increased work of breathing  CV: RRR, no murmurs  ABD: Soft, non-tender, non-distended, +BS  Neuro: LUE/LLE weakness, follows commands        Data Review:       Labs:            Recent Labs   Lab 03/01/25  0432 03/03/25  0849 03/04/25  0644   RBC 3.00* 3.00* 3.34*   HGB 10.2* 10.6* 11.7*   HCT 28.9* 28.9* 32.5*   MCV 96.3 96.3 97.3   MCH 34.0 35.3* 35.0*   MCHC 35.3 36.7 36.0   RDW 12.6 12.9 13.0   NEPRELIM 3.91  --   --    WBC 4.7 5.5  6.1   .0 131.0* 115.0*                  Recent Labs   Lab 03/03/25  0849 03/04/25  0644 03/05/25  0632   * 134* 130*   BUN 21 21 22   CREATSERUM 0.52* 0.56 0.53*   EGFRCR 97 95 96   CA 8.0* 8.2* 8.2*    144 143   K 3.8 3.9  3.9 3.9    112 111   CO2 21.0 20.0* 21.0         No results for input(s): \"ALT\", \"AST\", \"ALB\", \"AMYLASE\", \"LIPASE\", \"LDH\" in the last 168 hours.     Invalid input(s): \"ALPHOS\", \"TBIL\", \"DBIL\", \"TPROT\"        Meds:      INPATIENT MEDICATIONS     Scheduled Medications:                                            Scheduled Meds   dexamethasone, 4 mg, Q8H ROSIE  Osimertinib Mesylate, 80 mg, Daily  apixaban, 5 mg, BID  famotidine, 20 mg, Daily   Or  famotidine, 20 mg, Daily  levothyroxine, 50 mcg, Daily  levETIRAcetam, 500 mg, Q12H                                                               Drips:  IV Meds                  PRN Medications    PRN Meds   loperamide, 4 mg, QID PRN  guaiFENesin, 100 mg, Q4H PRN  acetaminophen, 650 mg, Q4H PRN   Or  acetaminophen, 650 mg, Q4H PRN  labetalol, 10 mg, Q10 Min PRN  hydrALAzine, 10 mg, Q2H PRN  ondansetron, 4 mg, Q6H PRN   Or  metoclopramide, 10 mg, Q8H PRN  albuterol, 1 puff, Q6H PRN                                                                                                                                                    Electronically signed by Amelia Schaefer MD at 3/6/2025 11:39 AM    3/7        Assessment/Plan:      Principal Problem:    Delirium, acute  Active Problems:    Dehydration    Primary malignant neoplasm of lung metastatic to other site, unspecified laterality (HCC)    Intracranial hemorrhage (HCC)    Acute hemorrhagic infarction of brain (HCC)    Palliative care by specialist       Ms. 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    Overall per family her mental status is improving and she seems more comfortable when at rest.  Due to medical history, railings needed and  used for seizure precaution and safety.  Patient is medically cleared for rehab once Auth obtained     L sided weakness  New right paramedian midbrain metastatic lesion  Syncope  - noted on CT head  - hold Eliquis, ok to restart Eliquis per per NSGY  - MRI brain without bleeding noted  - NSGY and neurology consulted  - continue keppra  - stop neurochecks  - PT/OT/SW  - per neurology, 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 Monzon from [facility] he is awaiting approval of an oral immunotherapy that may have some benefit, but not curative,  -palliative care eval  -trial of decadron, was agitated 2/27 palliative stopped provigil  -2/28 appears worse was agitated last night, sleepy this morning after IV pain meds  -continue to monitor, more confused and lethargic last week, seems to be improving with decadron and Tagrisso  -Now improving over the weekend.  Family would like to get her to rehab and continue her oral chemo agent.  They understand that she may continue to decline and be hospice appropriate in the near future.  -Discussed with her oncologist at [facility], will transition to oral Decadron here.  Then plan for 4-week taper at discharge.       Lung Ca with recurrent malignant pleural effusion  PE hx   Small R PTX - POA   Chronic hypoxic respiratory failure on home O2  - S/p R sided PleurX placement 1/31/25  - drain scheduled MWF 9:30AM, up to 1L  - Already has 2L O2 at home - at baseline now   -Most recent  chest x-ray with good placement of pleurx, however has not been draining as much, will get repeat chest x-ray today, continue to drain on schedule    Lung cancer with brain mets  - follow-up oncology as outpatient  - planned to start chemotherapy this week    - per family, had recent MRI brain a few weeks ago that showed decrease in size of brain mets  - has been on prednisone taper since November, currently on 10 mg daily-> now on decadron  - tegresso was ordered, Dr. Monzon from  wants her to start this, medication has been approved will start 2/27 per Dr. Monzon with oncology -> spit out medication on 2/27, was able to take 2/28 and 3/1 in pudding  -Will message her [facility] oncologist for Decadron taper    DVT/PE  - restart Eliquis   - 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    Hypothyroidism  - Continue synthroid     ACP  - CODE- DNR/select- updated today with POA  - POA- son Brayan updated at bedside   - lives at home with son     FN:  - IVF: stopped IVF  - Diet: regular     DVT Prophy: SCD, eliquis   Lines: port     Dispo: pending course; plan for [facility]       Dw family at bedside      Further recommendations pending patient's clinical course.  [facility] hospitalist to continue to follow patient while in house     Patient and/or patient's family given opportunity to ask questions and note understanding and agreeing with therapeutic plan as outlined     Note: This chart was prepared using voice recognition software and may contain unintended word substitution errors.      Medically ok for dc to rehab once auth approved      Amelia Schaefer MD  Hospitalist  [facility]   Answering service: [phone]         Subjective:      Appears comfortable today.  They did discuss drain with IR, will continue current management.     OBJECTIVE:     Blood pressure 118/75, pulse 91, temperature 97.5 °F (36.4 °C), temperature source Oral, resp. rate 16, height 5' 3\" (1.6 m), weight 134 lb (60.8 kg), SpO2 94%,  not currently breastfeeding.     Temp:  [97.5 °F (36.4 °C)-97.7 °F (36.5 °C)] 97.5 °F (36.4 °C)  Pulse:  [] 91  Resp:  [16] 16  BP: (102-118)/(68-75) 118/75  SpO2:  [93 %-94 %] 94 %        Intake/Output:     Intake/Output Summary (Last 24 hours) at 3/7/2025 1524  Last data filed at 3/7/2025 1012      Gross per 24 hour   Intake 120 ml   Output 300 ml   Net -180 ml              Last 3 Weights   03/04/25 0446 134 lb (60.8 kg)   02/25/25 0507 128 lb (58.1 kg)   02/21/25 1200 126 lb 8.7 oz (57.4 kg)   02/20/25 2050 128 lb 1.4 oz (58.1 kg)   02/20/25 1146 162 lb 0.6 oz (73.5 kg)   03/23/22 0942 162 lb (73.5 kg)   01/12/22 1248 160 lb (72.6 kg)         Exam  GEN: awake, cooperative   HEENT: EOMI  Pulm: decreased breath sounds R side, no increased work of breathing  CV: RRR, no murmurs  ABD: Soft, non-tender, non-distended, +BS  Neuro: LUE/LLE weakness, follows commands        Data Review:       Labs:            Recent Labs   Lab 03/01/25  0432 03/03/25  0849 03/04/25  0644   RBC 3.00* 3.00* 3.34*   HGB 10.2* 10.6* 11.7*   HCT 28.9* 28.9* 32.5*   MCV 96.3 96.3 97.3   MCH 34.0 35.3* 35.0*   MCHC 35.3 36.7 36.0   RDW 12.6 12.9 13.0   NEPRELIM 3.91  --   --    WBC 4.7 5.5 6.1   .0 131.0* 115.0*                  Recent Labs   Lab 03/03/25  0849 03/04/25  0644 03/05/25  0632   * 134* 130*   BUN 21 21 22   CREATSERUM 0.52* 0.56 0.53*   EGFRCR 97 95 96   CA 8.0* 8.2* 8.2*    144 143   K 3.8 3.9  3.9 3.9    112 111   CO2 21.0 20.0* 21.0         No results for input(s): \"ALT\", \"AST\", \"ALB\", \"AMYLASE\", \"LIPASE\", \"LDH\" in the last 168 hours.     Invalid input(s): \"ALPHOS\", \"TBIL\", \"DBIL\", \"TPROT\"        Imaging:  No results found.        Meds:      INPATIENT MEDICATIONS     Scheduled Medications:                                            Scheduled Meds   levETIRAcetam, 500 mg, BID  dexamethasone, 4 mg, Q8H ROSIE  Osimertinib Mesylate, 80 mg, Daily  apixaban, 5 mg, BID  famotidine, 20 mg, Daily    Or  famotidine, 20 mg, Daily  levothyroxine, 50 mcg, Daily                                                               Drips:  IV Meds                  PRN Medications    PRN Meds   loperamide, 4 mg, QID PRN  guaiFENesin, 100 mg, Q4H PRN  acetaminophen, 650 mg, Q4H PRN   Or  acetaminophen, 650 mg, Q4H PRN  labetalol, 10 mg, Q10 Min PRN  hydrALAzine, 10 mg, Q2H PRN  ondansetron, 4 mg, Q6H PRN   Or  metoclopramide, 10 mg, Q8H PRN  albuterol, 1 puff, Q6H PRN                                                                                                                                                    Electronically signed by Amelia Schaefer MD at 3/7/2025  3:25 PM

## 2025-03-10 NOTE — PAYOR COMM NOTE
--------------  DISCHARGE REVIEW    Payor: BC[employer] MEDICARE ADV PPO  Subscriber #:  [id number]  Authorization Number: [id number]    Admit date: 2/20/25  Admit time:   8:41 PM  Discharge Date: 3/9/2025  2:11 PM     Admitting Physician: Amelia Schaefer MD  Attending Physician:  No att. providers found  Primary Care Physician: Shruthi Simon MD          Discharge Summary Notes        Discharge Summary signed by Amelia Schaefer MD at 3/9/2025  5:27 PM       Author: Amelia Schaefer MD Specialty: HOSPITALIST Author Type: Physician    Filed: 3/9/2025  5:27 PM Date of Service: 3/9/2025  5:20 PM Status: Signed    : Amelia Schaefer MD (Physician)         [facility]  part of [facility] Discharge Summary   Patient ID:  Damari Hong  [id number]  74 year old  5/21/1950    Admit date: 2/20/2025    Discharge date and time: 3/9/2025  2:11 PM      Attending Physician: No att. providers found     Primary Care Physician: Shruthi Simon MD     Reason for admission syncope altered mental status, metastatic cancer (see HPI on HP for further detail)    Discharged Condition: stable    Disposition: SNF    Risk of Readmission Lace+ Score: 65  59-90 High Risk  29-58 Medium Risk  0-28   Low Risk    Important follow up:  DC to skilled nursing facility, follow-up with PCP within 1 week of leaving rehab.  Follow-up with outpatient oncologist.  He was message via [employer] about discharge    Discharge meds  I reviewed and reconciled current and discharge medications on the day of discharge with the changes reflected below.       Medication List        START taking these medications      dexamethasone 2 MG Tabs  Commonly known as: Decadron  Take 2 tablets (4 mg total) by mouth 2 (two) times daily with meals for 7 days, THEN 2 tablets (4 mg total) daily with breakfast for 7 days, THEN 1 tablet (2 mg total) daily with breakfast for 7 days, THEN 0.5 tablets (1 mg total) daily with breakfast  for 7 days.  Start taking on: March 9, 2025     guaiFENesin 100 MG/5 ML  Commonly known as: Robitussin  Take 5 mL (100 mg total) by mouth every 4 (four) hours as needed.     levETIRAcetam 500 MG Tabs  Commonly known as: Keppra  Take 1 tablet (500 mg total) by mouth 2 (two) times daily.     loperamide 2 MG Caps  Commonly known as: Imodium  Take 2 capsules (4 mg total) by mouth 4 (four) times daily as needed for Diarrhea.     Tagrisso 80 MG Tabs  Generic drug: Osimertinib Mesylate  Notes to patient: Your home medication was given to your family            CONTINUE taking these medications      acetaminophen 325 MG Tabs  Commonly known as: Tylenol     albuterol 108 (90 Base) MCG/ACT Aers  Commonly known as: Ventolin HFA     apixaban 5 MG Tabs  Commonly known as: Eliquis     benzonatate 100 MG Caps  Commonly known as: Tessalon     Biotin 1000 MCG Tabs     folic acid 1 MG Tabs  Commonly known as: Folvite     Omeprazole 40 MG Cpdr  Take 1 capsule (40 mg total) by mouth daily. Before meal     Unithroid 50 MCG Tabs  Generic drug: levothyroxine  Take 1 tablet (50 mcg total) by mouth daily.     Vitamin D 50 MCG (2000 UT) Tabs            STOP taking these medications      mirtazapine 7.5 MG Tabs  Commonly known as: Remeron     potassium chloride 20 MEQ Tbcr  Commonly known as: Klor-Con M20     predniSONE 20 MG Tabs  Commonly known as: Deltasone               Where to Get Your Medications        You can get these medications from any pharmacy    Bring a paper prescription for each of these medications  dexamethasone 2 MG Tabs  guaiFENesin 100 MG/5 ML  levETIRAcetam 500 MG Tabs  loperamide 2 MG Caps       HPI per chart  Ms. 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      History obtained mostly from son. Patient lives with son,  was recently admitted at [facility] about 1 month ago with the flu and hasn't fully recovered since. Mostly bed/wheelchair bound since admission, able to transfer from bed to chair with stand and pivot. Normally has some issues with word finding and slow sleech. Son helped patient go to the bathroom at 2AM and got her back into bed then checked on her in the morning at 7AM and found her on the floor with some dried blood on her lips. Thinks she slid out of bed sometime in the night. He called EMS who was able to get her back to bed and do an assessment. Had normal vitals, no obvious fractures or significant bruising, offered to take her to the ER but she also had an oncologist appt that morning so they planned to go to that instead. While she was getting blood drawn from her port, she became unresponsive. Son felt like she was having a seizure however no seizure like activity noted but she was in and out of consciousness. EMS called and brought her here. Son notes that her speech is a little slower than usual and she is having more word finding difficulty than normal. Normally has some double vision. Has had generlized weakness since last admission but no unilateral weakness that family was aware of.      In the ED, BP Stable, tachycardic, O2 sat stable on home 2L NC. Labs without significant abnormality. CT head concern for hemorrhagic metastasis. NSGY consulted.   Hospital Course  Ms. 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 Overall per family her mental status is improving  and she seems more comfortable when at rest. Due to medical history, railings needed and used for seizure precaution and safety. Patient is medically cleared for rehab once Auth obtained   Discharge Diagnoses:   L sided weakness  New right paramedian midbrain metastatic lesion  Syncope  - noted on CT head  - hold Eliquis, ok to restart Eliquis per per NSGY  - MRI brain without bleeding noted  - NSGY and neurology consulted  - continue keppra  - stop neurochecks  - PT/OT/SW  - per neurology, 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 Monzon from [facility] he is awaiting approval of an oral immunotherapy that may have some benefit, but not curative,  -palliative care eval  -trial of decadron, was agitated 2/27 palliative stopped provigil  -2/28 appears worse was agitated last night, sleepy this morning after IV pain meds  -continue to monitor, more confused and lethargic last week, seems to be improving with decadron and Tagrisso  -Now improving over the weekend.  Family would like to get her to rehab and continue her oral chemo agent.  They understand that she may continue to decline and be hospice appropriate in the near future.  -Discussed with her oncologist at [facility], will transition to oral Decadron here.  Then plan for 4-week taper at discharge.       Lung Ca with recurrent malignant pleural effusion  PE hx   Small R PTX - POA   Chronic hypoxic respiratory failure on home O2  - S/p R sided PleurX placement 1/31/25  - drain scheduled MWF 9:30AM, up to 1L  - Already has 2L O2 at home - at baseline now   -Most recent chest x-ray with good placement of pleurx, however has not been draining as much, will get repeat chest x-ray today, continue to drain on schedule    Lung cancer with brain mets  - follow-up oncology as outpatient  - planned to start chemotherapy this week    - per family, had recent MRI brain a few  weeks ago that showed decrease in size of brain mets  - has been on prednisone taper since November, currently on 10 mg daily-> now on decadron  - tegresso was ordered, Dr. Monzon from  wants her to start this, medication has been approved will start 2/27 per Dr. Monzon with oncology -> spit out medication on 2/27, was able to take 2/28 and 3/1 in pudding  -Will message her [facility] oncologist for Decadron taper    DVT/PE  - restart Eliquis   - 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    Hypothyroidism  - Continue synthroid    Consults: IP CONSULT TO NEUROSURGERY  IP CONSULT TO NEUROLOGY  IP CONSULT TO SOCIAL WORK  IP CONSULT TO SOCIAL WORK  NURSING CONSULT TO DIETITIAN  IP CONSULT TO SOCIAL WORK  IP CONSULT TO SOCIAL WORK  IP CONSULT TO SOCIAL WORK  IP CONSULT PALLIATIVE CARE  CONSULT  - HOME HEALTH  IP CONSULT TO INTERVENTIONAL RADIOLOGY    Radiology: XR CHEST AP PORTABLE  (CPT=71045)    Result Date: 3/4/2025  PROCEDURE: XR CHEST AP PORTABLE  (CPT=71045) TIME: 12:08 p.m.  COMPARISON: [facility], XR CHEST AP PORTABLE (CPT=71045), 3/02/2025, 10:24 AM.  INDICATIONS: Cough and shortness of breath.  TECHNIQUE:   Single view.           CONCLUSION:  1. Right-sided PleurX catheter in place.  Stable loculated right pleural effusion and poor aeration of the right lung with stable ground-glass attenuation opacity in right lung which may be related to interstitial spread of tumor, edema, or pneumonia. 2. Left lung remains clear. 3. Right-sided Port-A-Cath with tip in superior right atrium unchanged.     Dictated by (CST): Nasir Jamil MD on 3/04/2025 at 6:29 PM     Finalized by (CST): Nasir Jamil MD on 3/04/2025 at 6:32 PM          XR CHEST AP PORTABLE  (CPT=71045)    Result Date: 3/2/2025  PROCEDURE: XR CHEST AP PORTABLE  (CPT=71045) TIME: 10:24.   COMPARISON: None.  INDICATIONS: cough, shortness of breath, concern for fluid overload vs. aspiration vs. pneumonitis from chemo  med  TECHNIQUE:   Single view.   FINDINGS:  CARDIAC/VASC: The cardiomediastinal silhouette is enlarged.  There is atherosclerotic calcification of the tortuous thoracic aorta MEDIAST/DAREN:   The right mediastinum/hilum are obscured. LUNGS/PLEURA: Large right pleural effusion with associated multifocal opacities in the right lung.  No pneumothorax. BONES: Multilevel degenerative changes of the thoracic spine.  Severe left glenohumeral joint osteoarthrosis with associated contour deformity of the left humeral head. OTHER: There is a right chest wall port that terminates at the cavoatrial junction.  There is a right pleural drainage catheter, which terminates superiorly.  There are surgical clips in the right upper quadrant of the abdomen.         CONCLUSION:   Large right pleural effusion with associated multifocal opacities in right lung, which may reflect atelectasis, pneumonitis, pulmonary edema, or pneumonia.  Well-positioned right pleural drainage catheter and right chest wall port.     Dictated by (CST): Matt Campuzano MD on 3/02/2025 at 11:22 AM     Finalized by (CST): Matt Campuzano MD on 3/02/2025 at 11:24 AM          MRI BRAIN (W+WO) (CPT=70553)    Result Date: 2/21/2025  PROCEDURE: MRI BRAIN (W+WO) (CPT=70553)  COMPARISON: [facility], CT BRAIN OR HEAD (CPT=70450), 2/20/2025, 12:29 PM.  INDICATIONS: brain mets, new L sided weakness  TECHNIQUE: A variety of imaging planes and parameters were utilized for visualization of suspected pathology.  Images were performed without and with gadolinium contrast.   FINDINGS:  CEREBRUM: Multiple small enhancing metastatic lesions with the largest lesions measuring 8 mm in the left frontal convexity and 7 mm in the right inferior frontal lobe.  There are at least 10 enhancing lesions in the left cerebral hemisphere .  In right cerebral hemisphere there are at least 7 small enhancing lesions.  There are 3 enhancing lesions in the left thalamus with largest  measuring 6 mm, and 2 enhancing lesions in the right thalamus with the largest measuring 6 mm.  There are multi focal FLAIR hyperintense foci in the periventricular white matter, and subcortical white matter bilaterally.  There is hemosiderin deposition at a old left subinsular/basal ganglionic infarct.  No acute infarct, large region of vasogenic  edema or large mass.  Few small metastatic lesions have associated hemosiderin deposition. CEREBELLUM: Multiple small enhancing cerebellar lesions with largest in the left cerebellar hemisphere measuring 5 mm and largest in the right hemisphere measuring 4 mm.  Few lesions have hemosiderin deposition as evidence by susceptibility artifact. BRAINSTEM: Enhancing 7 mm lesion in the right posterior mid brain.  Small focus of hemosiderin deposition associated with the right midbrain lesion.  Patchy pathologic signal in the sola. CSF SPACES: No hydrocephalus, subarachnoid hemorrhage, or mass.  SKULL: No mass or other significant visible lesion.  SINUSES: Limited views demonstrate no significant mucosal thickening or fluid.  ORBITS: Limited views are unremarkable.  OTHER: No restricted diffusion.  Flow present in anterior and posterior circulation vessels.         CONCLUSION:  1. Multiple supra tentorial brain metastases, right paramedian midbrain metastasis, and multiple small cerebellar metastases.  No large area of vasogenic edema/significant mass effect or midline shift.  No hydrocephalus.  No acute infarct.    Dictated by (CST): Nasir Jamil MD on 2/21/2025 at 6:17 PM     Finalized by (CST): Nasir Jamil MD on 2/21/2025 at 6:27 PM          CT SPINE CERVICAL (CPT=72125)    Result Date: 2/20/2025  PROCEDURE: CT SPINE CERVICAL (CPT=72125)  COMPARISON: None available.  INDICATIONS: Fall with posttraumatic neck pain.  TECHNIQUE: Multidetector CT images of the cervical spine were obtained without the infusion of non-ionic intravenous contrast material. Automated exposure  control for dose reduction was used. Adjustment of the mA and/or kV was done based on the patient's  size. Iterative reconstruction technique for dose reduction was employed. Dose information was transmitted to the ACR (American College of Radiology) NRDR (National Radiology Data Registry), which includes the Dose Index Registry.   FINDINGS: ALIGNMENT: The anatomic cervical lordosis is reversed. BONES: No fractures are apparent. There is an indeterminate relatively sclerotic ovoid lesion of the C3 vertebral segment measuring 0.8 x 0.8 x 0.8 cm. A sclerotic lesion at the inferior endplate of C7 measures 1.3 x 1.2 x 1.0 cm. An additional smaller lesion of C4 is present. A lesion involving the left transverse process of C6 is present. Sclerotic lesions of the facet joints are also observed. A few small lesions of T2 are present.  CRANIOCERVICAL AREA: Normal foramen magnum without Chiari malformation.  CERVICAL DISC LEVELS: There is degeneration between the anterior arch of C1 and the odontoid process. There is no rotational abnormality of the atlantoaxial articulation. Posterior disc osteophyte complex formation is present at C4-C5, C5-C6, and C6-C7. Bulky multilevel facet arthrosis is observed. These findings contribute to spinal canal and substantial foraminal impingement. PARASPINAL AREA: No visible mass.  OTHER: There is no visible swelling of the prevertebral soft tissues. Scarring of the right apex is partially visualized. A right-sided chest port catheter is partially visualized.          CONCLUSION:  1. No acute fracture or posttraumatic malalignment cervical spine.  2. Numerous osteoblastic presumed osseous metastases.  3. Multilevel degenerative changes of the cervical spine are apparent.  4. Lesser incidental findings as above.    Dictated by (CST): Rayo Curiel MD on 2/20/2025 at 12:59 PM     Finalized by (CST): Rayo Curiel MD on 2/20/2025 at 1:02 PM          CT BRAIN OR HEAD (CPT=70450)    Result  Date: 2/20/2025  PROCEDURE: CT BRAIN OR HEAD (CPT=70450)  COMPARISON: None.  INDICATIONS: fall, brain mets  TECHNIQUE: CT images were obtained without contrast material.  Automated exposure control for dose reduction was used.  Dose information is transmitted to the ACR (American College of Radiology) NRDR (National Radiology Data Registry) which includes the Dose Index Registry.  FINDINGS:    There is a very small ill-defined area of decreased attenuation at the superior aspect of the left frontoparietal lobe.  There is a small focal area of increased attenuation at the periphery of this ill-defined area.  The findings could represent a hemorrhagic metastasis.  There is no mass effect or associated abnormality.  There are no other similar appearing areas to suggest brain metastasis.  However, noncontrast CT is ineffective for assessing subtle metastases.  There are minimal global atrophic changes within the cerebral and cerebellar hemispheres.  There are small sized areas of low-attenuation in the periventricular and deep subcortical white matter compatible with chronic ischemic white matter changes.  The ventricular system appears grossly normal with no evidence of hydrocephalus or ventricular obstruction.  On bone windows, there is no calvarial fracture.  On soft tissue windows, there is no gross soft tissue abnormality.            CONCLUSION: Very small subtle area of decreased attenuation within the left frontoparietal lobe with small focal area of of peripheral increased density.  The findings could represent a hemorrhagic metastasis.  However this is an equivocal finding.  There is no mass effect or associated abnormality.  There are no similar appearing areas within the brain. The remainder of the exam is unremarkable.    Dictated by (CST): Aki Morrison MD on 2/20/2025 at 12:45 PM     Finalized by (CST): Aki Morrison MD on 2/20/2025 at 12:52 PM             Operative Procedures:      Day of discharge  pretty awake this morning.  Eating a little bit.  Overall drinks better than she eats but the family continues to try to encourage her.  She has no new complaints or questions    Exam  Vitals:    03/09/25 0528   BP: 111/77   Pulse: 91   Resp: 16   Temp: 97.9 °F (36.6 °C)     No acute distress, alert and oriented to self and family, cooperative follows simple commands, mainly let her family do most of the talking.  Lungs Clear  Heart Regular  Abdomen Benign    Total Time Coordinating Care: > than 30 minutes  Note: This chart was prepared using voice recognition software and may contain unintended word substitution errors.     Discussed with sons at bedside, oncologist message via [employer]  Patient had opportunity to ask questions and state understand and agree with therapeutic plan as outlined      Electronically signed by Amelia Schaefer MD on 3/9/2025  5:27 PM     3/8        Assessment/Plan:      Principal Problem:    Delirium, acute  Active Problems:    Dehydration    Primary malignant neoplasm of lung metastatic to other site, unspecified laterality (HCC)    Intracranial hemorrhage (HCC)    Acute hemorrhagic infarction of brain (HCC)    Palliative care by specialist       Ms. 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    Overall per family her mental status is improving and she seems more comfortable when at rest.  Due to medical history, railings needed and  used for seizure precaution and safety.  Patient is medically cleared for rehab once Auth obtained     L sided  weakness  New right paramedian midbrain metastatic lesion  Syncope  - noted on CT head  - hold Eliquis, ok to restart Eliquis per per NSGY  - MRI brain without bleeding noted  - NSGY and neurology consulted  - continue keppra  - stop neurochecks  - PT/OT/SW  - per neurology, 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 Monzon from [facility] he is awaiting approval of an oral immunotherapy that may have some benefit, but not curative,  -palliative care eval  -trial of decadron, was agitated 2/27 palliative stopped provigil  -2/28 appears worse was agitated last night, sleepy this morning after IV pain meds  -continue to monitor, more confused and lethargic last week, seems to be improving with decadron and Tagrisso  -Now improving over the weekend.  Family would like to get her to rehab and continue her oral chemo agent.  They understand that she may continue to decline and be hospice appropriate in the near future.  -Discussed with her oncologist at [facility], will transition to oral Decadron here.  Then plan for 4-week taper at discharge.       Lung Ca with recurrent malignant pleural effusion  PE hx   Small R PTX - POA   Chronic hypoxic respiratory failure on home O2  - S/p R sided PleurX placement 1/31/25  - drain scheduled MWF 9:30AM, up to 1L  - Already has 2L O2 at home - at baseline now   -Most recent chest x-ray with good placement of pleurx, however has not been draining as much, will get repeat chest x-ray today, continue to drain on schedule    Lung cancer with brain mets  - follow-up oncology as outpatient  - planned to start chemotherapy this week    - per family, had recent MRI brain a few weeks ago that showed decrease in size of brain mets  - has been on prednisone taper since November, currently on 10 mg daily-> now on decadron  - tegresso was ordered, Dr. Monzon from  wants her to start this, medication  has been approved will start 2/27 per Dr. Monzon with oncology -> spit out medication on 2/27, was able to take 2/28 and 3/1 in pudding  -Will message her [facility] oncologist for Decadron taper    DVT/PE  - restart Eliquis   - 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    Hypothyroidism  - Continue synthroid     ACP  - CODE- DNR/select- updated today with POA  - POA- son Brayan updated at bedside   - lives at home with son     FN:  - IVF: stopped IVF  - Diet: regular     DVT Prophy: SCD, eliquis   Lines: port     Dispo: pending course; plan for [facility]       Dw family at bedside      Further recommendations pending patient's clinical course.  [facility] hospitalist to continue to follow patient while in house     Patient and/or patient's family given opportunity to ask questions and note understanding and agreeing with therapeutic plan as outlined     Note: This chart was prepared using voice recognition software and may contain unintended word substitution errors.      Medically ok for dc to rehab once auth approved      Amelia Schaefer MD  Hospitalist  [facility]   Answering service: [phone]         Subjective:      Appears comfortable today.  No new issues     OBJECTIVE:     Blood pressure 123/75, pulse 92, temperature 97.8 °F (36.6 °C), temperature source Oral, resp. rate 18, height 5' 3\" (1.6 m), weight 134 lb (60.8 kg), SpO2 94%, not currently breastfeeding.     Temp:  [97.8 °F (36.6 °C)] 97.8 °F (36.6 °C)  Pulse:  [87-98] 92  Resp:  [16-18] 18  BP: (117-123)/(75-89) 123/75  SpO2:  [93 %-95 %] 94 %        Intake/Output:     Intake/Output Summary (Last 24 hours) at 3/8/2025 1420  Last data filed at 3/8/2025 0510      Gross per 24 hour   Intake 120 ml   Output 350 ml   Net -230 ml              Last 3 Weights   03/04/25 0446 134 lb (60.8 kg)   02/25/25 0507 128 lb (58.1 kg)   02/21/25 1200 126 lb 8.7 oz (57.4 kg)   02/20/25 2050 128 lb 1.4 oz (58.1 kg)   02/20/25 1146 162 lb 0.6 oz (73.5 kg)    03/23/22 0942 162 lb (73.5 kg)   01/12/22 1248 160 lb (72.6 kg)         Exam  GEN: awake, cooperative   HEENT: EOMI  Pulm: decreased breath sounds R side, no increased work of breathing  CV: RRR, no murmurs  ABD: Soft, non-tender, non-distended, +BS  Neuro: LUE/LLE weakness, follows commands        Data Review:       Labs:           Recent Labs   Lab 03/03/25  0849 03/04/25  0644   RBC 3.00* 3.34*   HGB 10.6* 11.7*   HCT 28.9* 32.5*   MCV 96.3 97.3   MCH 35.3* 35.0*   MCHC 36.7 36.0   RDW 12.9 13.0   WBC 5.5 6.1   .0* 115.0*                  Recent Labs   Lab 03/03/25  0849 03/04/25  0644 03/05/25  0632   * 134* 130*   BUN 21 21 22   CREATSERUM 0.52* 0.56 0.53*   EGFRCR 97 95 96   CA 8.0* 8.2* 8.2*    144 143   K 3.8 3.9  3.9 3.9    112 111   CO2 21.0 20.0* 21.0         No results for input(s): \"ALT\", \"AST\", \"ALB\", \"AMYLASE\", \"LIPASE\", \"LDH\" in the last 168 hours.     Invalid input(s): \"ALPHOS\", \"TBIL\", \"DBIL\", \"TPROT\"        Imaging:  No results found.        Meds:      INPATIENT MEDICATIONS     Scheduled Medications:                                            Scheduled Meds   levETIRAcetam, 500 mg, BID  dexamethasone, 4 mg, Q8H ROSIE  Osimertinib Mesylate, 80 mg, Daily  apixaban, 5 mg, BID  famotidine, 20 mg, Daily   Or  famotidine, 20 mg, Daily  levothyroxine, 50 mcg, Daily                                                               Drips:  IV Meds                  PRN Medications    PRN Meds   loperamide, 4 mg, QID PRN  guaiFENesin, 100 mg, Q4H PRN  acetaminophen, 650 mg, Q4H PRN   Or  acetaminophen, 650 mg, Q4H PRN  labetalol, 10 mg, Q10 Min PRN  hydrALAzine, 10 mg, Q2H PRN  ondansetron, 4 mg, Q6H PRN   Or  metoclopramide, 10 mg, Q8H PRN  albuterol, 1 puff, Q6H PRN                                                                                                                                                    Electronically signed by Amelia Schaefer MD at 3/8/2025  2:21 PM

## 2025-03-11 ENCOUNTER — PATIENT OUTREACH (OUTPATIENT)
Dept: CASE MANAGEMENT | Age: 75
End: 2025-03-11

## 2025-03-11 NOTE — PROGRESS NOTES
Hospital follow up.    Patient discharged to the [facility].    Uche Tracy MD  Neurology  [address] [id number] [phone]    Call hung up.    Closing encounter.

## 2025-03-12 NOTE — PROGRESS NOTES
Provider Clarification     Additional information on the patient's nutritional status.    Severe malnutrition    This note is part of the patient's medical record.

## (undated) NOTE — ED AVS SNAPSHOT
Maksim Guy   MRN: [id number]    Department:  [facility] Emergency Department   Date of Visit:  10/1/2017           Disclosure     Insurance plans vary and the physician(s) referred by the ER may not be covered by your plan.  Please contact yo If you have been prescribed any medication(s), please fill your prescription right away and begin taking the medication(s) as directed    If the emergency physician has read X-rays, these will be re-interpreted by a radiologist.  If there is a significant